# Patient Record
Sex: FEMALE | Race: WHITE | NOT HISPANIC OR LATINO | Employment: FULL TIME | URBAN - METROPOLITAN AREA
[De-identification: names, ages, dates, MRNs, and addresses within clinical notes are randomized per-mention and may not be internally consistent; named-entity substitution may affect disease eponyms.]

---

## 2017-04-25 ENCOUNTER — HISTORICAL (OUTPATIENT)
Dept: LAB | Facility: HOSPITAL | Age: 57
End: 2017-04-25

## 2017-05-22 ENCOUNTER — HISTORICAL (OUTPATIENT)
Dept: RADIOLOGY | Facility: HOSPITAL | Age: 57
End: 2017-05-22

## 2018-08-30 ENCOUNTER — HOSPITAL ENCOUNTER (OUTPATIENT)
Dept: MEDSURG UNIT | Facility: HOSPITAL | Age: 58
End: 2018-09-01
Attending: INTERNAL MEDICINE | Admitting: INTERNAL MEDICINE

## 2018-08-30 LAB
ABS NEUT (OLG): 7.6 X10(3)/MCL (ref 2.1–9.2)
ALBUMIN SERPL-MCNC: 4.2 GM/DL (ref 3.4–5)
ALBUMIN/GLOB SERPL: 1.2 RATIO (ref 1.1–2)
ALP SERPL-CCNC: 91 UNIT/L (ref 38–126)
ALT SERPL-CCNC: 24 UNIT/L (ref 12–78)
APPEARANCE, UA: CLEAR
APTT PPP: 30.3 SECOND(S) (ref 24.8–36.9)
AST SERPL-CCNC: 31 UNIT/L (ref 15–37)
BACTERIA SPEC CULT: ABNORMAL /HPF
BASOPHILS # BLD AUTO: 0 X10(3)/MCL (ref 0–0.2)
BASOPHILS NFR BLD AUTO: 0 %
BILIRUB SERPL-MCNC: 0.6 MG/DL (ref 0.2–1)
BILIRUB UR QL STRIP: NEGATIVE
BILIRUBIN DIRECT+TOT PNL SERPL-MCNC: 0.2 MG/DL (ref 0–0.5)
BILIRUBIN DIRECT+TOT PNL SERPL-MCNC: 0.4 MG/DL (ref 0–0.8)
BUN SERPL-MCNC: 20 MG/DL (ref 7–18)
CALCIUM SERPL-MCNC: 10.1 MG/DL (ref 8.5–10.1)
CHLORIDE SERPL-SCNC: 100 MMOL/L (ref 98–107)
CO2 SERPL-SCNC: 31 MMOL/L (ref 21–32)
COLOR UR: ABNORMAL
CREAT SERPL-MCNC: 0.91 MG/DL (ref 0.55–1.02)
D DIMER PPP IA.FEU-MCNC: 297 NG/ML FEU (ref 0–500)
EOSINOPHIL # BLD AUTO: 0.2 X10(3)/MCL (ref 0–0.9)
EOSINOPHIL NFR BLD AUTO: 1 %
ERYTHROCYTE [DISTWIDTH] IN BLOOD BY AUTOMATED COUNT: 13.2 % (ref 11.5–17)
GLOBULIN SER-MCNC: 3.6 GM/DL (ref 2.4–3.5)
GLUCOSE (UA): NEGATIVE
GLUCOSE SERPL-MCNC: 84 MG/DL (ref 74–106)
HCT VFR BLD AUTO: 42.6 % (ref 37–47)
HGB BLD-MCNC: 13.7 GM/DL (ref 12–16)
HGB UR QL STRIP: NEGATIVE
INR PPP: 0.99 (ref 0–1.27)
KETONES UR QL STRIP: ABNORMAL
LEUKOCYTE ESTERASE UR QL STRIP: ABNORMAL
LYMPHOCYTES # BLD AUTO: 1.8 X10(3)/MCL (ref 0.6–4.6)
LYMPHOCYTES NFR BLD AUTO: 17 %
MCH RBC QN AUTO: 29.3 PG (ref 27–31)
MCHC RBC AUTO-ENTMCNC: 32.2 GM/DL (ref 33–36)
MCV RBC AUTO: 91.2 FL (ref 80–94)
MONOCYTES # BLD AUTO: 0.9 X10(3)/MCL (ref 0.1–1.3)
MONOCYTES NFR BLD AUTO: 8 %
NEUTROPHILS # BLD AUTO: 7.6 X10(3)/MCL (ref 1.4–7.9)
NEUTROPHILS NFR BLD AUTO: 72 %
NITRITE UR QL STRIP: NEGATIVE
PH UR STRIP: 5 [PH] (ref 5–9)
PLATELET # BLD AUTO: 229 X10(3)/MCL (ref 130–400)
PMV BLD AUTO: 12.1 FL (ref 9.4–12.4)
POTASSIUM SERPL-SCNC: 4.2 MMOL/L (ref 3.5–5.1)
PROT SERPL-MCNC: 7.8 GM/DL (ref 6.4–8.2)
PROT UR QL STRIP: NEGATIVE
PROTHROMBIN TIME: 13.4 SECOND(S) (ref 12.2–14.7)
RBC # BLD AUTO: 4.67 X10(6)/MCL (ref 4.2–5.4)
RBC #/AREA URNS HPF: ABNORMAL /[HPF]
SODIUM SERPL-SCNC: 138 MMOL/L (ref 136–145)
SP GR UR STRIP: 1.03 (ref 1–1.03)
SQUAMOUS EPITHELIAL, UA: ABNORMAL
TROPONIN I SERPL-MCNC: <0.02 NG/ML (ref 0.02–0.49)
UROBILINOGEN UR STRIP-ACNC: 0.2
WBC # SPEC AUTO: 10.5 X10(3)/MCL (ref 4.5–11.5)
WBC #/AREA URNS HPF: ABNORMAL /[HPF]

## 2018-08-31 LAB
APTT PPP: 28.5 SECOND(S) (ref 24.8–36.9)
CK MB SERPL-MCNC: <0.5 NG/ML (ref 0.5–3.6)
CK MB SERPL-MCNC: <0.5 NG/ML (ref 0.5–3.6)
CK SERPL-CCNC: 31 UNIT/L (ref 26–192)
CK SERPL-CCNC: 54 UNIT/L (ref 26–192)
TROPONIN I SERPL-MCNC: <0.02 NG/ML (ref 0.02–0.49)
TROPONIN I SERPL-MCNC: <0.02 NG/ML (ref 0.02–0.49)

## 2018-09-01 LAB
ABS NEUT (OLG): 5.07 X10(3)/MCL (ref 2.1–9.2)
ALBUMIN SERPL-MCNC: 2.8 GM/DL (ref 3.4–5)
ALBUMIN/GLOB SERPL: 1 RATIO (ref 1.1–2)
ALP SERPL-CCNC: 72 UNIT/L (ref 38–126)
ALT SERPL-CCNC: 15 UNIT/L (ref 12–78)
AST SERPL-CCNC: 18 UNIT/L (ref 15–37)
BASOPHILS # BLD AUTO: 0 X10(3)/MCL (ref 0–0.2)
BASOPHILS NFR BLD AUTO: 0 %
BILIRUB SERPL-MCNC: 0.4 MG/DL (ref 0.2–1)
BILIRUBIN DIRECT+TOT PNL SERPL-MCNC: 0.1 MG/DL (ref 0–0.5)
BILIRUBIN DIRECT+TOT PNL SERPL-MCNC: 0.3 MG/DL (ref 0–0.8)
BUN SERPL-MCNC: 10 MG/DL (ref 7–18)
CALCIUM SERPL-MCNC: 8.9 MG/DL (ref 8.5–10.1)
CHLORIDE SERPL-SCNC: 108 MMOL/L (ref 98–107)
CO2 SERPL-SCNC: 25 MMOL/L (ref 21–32)
CREAT SERPL-MCNC: 0.69 MG/DL (ref 0.55–1.02)
EOSINOPHIL # BLD AUTO: 0.1 X10(3)/MCL (ref 0–0.9)
EOSINOPHIL NFR BLD AUTO: 2 %
ERYTHROCYTE [DISTWIDTH] IN BLOOD BY AUTOMATED COUNT: 13.2 % (ref 11.5–17)
GLOBULIN SER-MCNC: 2.7 GM/DL (ref 2.4–3.5)
GLUCOSE SERPL-MCNC: 120 MG/DL (ref 74–106)
HCT VFR BLD AUTO: 32.1 % (ref 37–47)
HGB BLD-MCNC: 10.3 GM/DL (ref 12–16)
LYMPHOCYTES # BLD AUTO: 1.6 X10(3)/MCL (ref 0.6–4.6)
LYMPHOCYTES NFR BLD AUTO: 21 %
MCH RBC QN AUTO: 29.3 PG (ref 27–31)
MCHC RBC AUTO-ENTMCNC: 32.1 GM/DL (ref 33–36)
MCV RBC AUTO: 91.5 FL (ref 80–94)
MONOCYTES # BLD AUTO: 0.8 X10(3)/MCL (ref 0.1–1.3)
MONOCYTES NFR BLD AUTO: 11 %
NEUTROPHILS # BLD AUTO: 5.07 X10(3)/MCL (ref 1.4–7.9)
NEUTROPHILS NFR BLD AUTO: 66 %
PLATELET # BLD AUTO: 155 X10(3)/MCL (ref 130–400)
PMV BLD AUTO: 12.4 FL (ref 9.4–12.4)
POTASSIUM SERPL-SCNC: 3.3 MMOL/L (ref 3.5–5.1)
PROT SERPL-MCNC: 5.5 GM/DL (ref 6.4–8.2)
RBC # BLD AUTO: 3.51 X10(6)/MCL (ref 4.2–5.4)
SODIUM SERPL-SCNC: 140 MMOL/L (ref 136–145)
WBC # SPEC AUTO: 7.6 X10(3)/MCL (ref 4.5–11.5)

## 2018-09-05 ENCOUNTER — HISTORICAL (OUTPATIENT)
Dept: ADMINISTRATIVE | Facility: HOSPITAL | Age: 58
End: 2018-09-05

## 2018-09-05 LAB
ABS NEUT (OLG): 3.15 X10(3)/MCL (ref 2.1–9.2)
ALBUMIN SERPL-MCNC: 3.2 GM/DL (ref 3.4–5)
ALBUMIN/GLOB SERPL: 1.1 {RATIO}
ALP SERPL-CCNC: 82 UNIT/L (ref 38–126)
ALT SERPL-CCNC: 17 UNIT/L (ref 12–78)
APPEARANCE, UA: CLEAR
AST SERPL-CCNC: 20 UNIT/L (ref 15–37)
BACTERIA SPEC CULT: NORMAL /HPF
BASOPHILS # BLD AUTO: 0 X10(3)/MCL (ref 0–0.2)
BASOPHILS NFR BLD AUTO: 1 %
BILIRUB SERPL-MCNC: 0.5 MG/DL (ref 0.2–1)
BILIRUB UR QL STRIP: NEGATIVE
BILIRUBIN DIRECT+TOT PNL SERPL-MCNC: 0.2 MG/DL (ref 0–0.2)
BILIRUBIN DIRECT+TOT PNL SERPL-MCNC: 0.3 MG/DL (ref 0–0.8)
BUN SERPL-MCNC: 16 MG/DL (ref 7–18)
CALCIUM SERPL-MCNC: 9.3 MG/DL (ref 8.5–10.1)
CHLORIDE SERPL-SCNC: 107 MMOL/L (ref 98–107)
CHOLEST SERPL-MCNC: 129 MG/DL (ref 0–200)
CHOLEST/HDLC SERPL: 2.9 {RATIO} (ref 0–4)
CO2 SERPL-SCNC: 24 MMOL/L (ref 21–32)
COLOR UR: NORMAL
CREAT SERPL-MCNC: 0.69 MG/DL (ref 0.55–1.02)
DEPRECATED CALCIDIOL+CALCIFEROL SERPL-MC: 28.49 NG/ML (ref 30–80)
EOSINOPHIL # BLD AUTO: 0.2 X10(3)/MCL (ref 0–0.9)
EOSINOPHIL NFR BLD AUTO: 3 %
ERYTHROCYTE [DISTWIDTH] IN BLOOD BY AUTOMATED COUNT: 13.2 % (ref 11.5–17)
EST. AVERAGE GLUCOSE BLD GHB EST-MCNC: 111 MG/DL
GLOBULIN SER-MCNC: 3 GM/DL (ref 2.4–3.5)
GLUCOSE (UA): NEGATIVE
GLUCOSE SERPL-MCNC: 86 MG/DL (ref 74–106)
H PYLORI AB SER IA-ACNC: NEGATIVE
HBA1C MFR BLD: 5.5 % (ref 4.2–6.3)
HCT VFR BLD AUTO: 34.5 % (ref 37–47)
HDLC SERPL-MCNC: 45 MG/DL (ref 35–60)
HGB BLD-MCNC: 11.2 GM/DL (ref 12–16)
HGB UR QL STRIP: NEGATIVE
KETONES UR QL STRIP: NEGATIVE
LDLC SERPL CALC-MCNC: 69 MG/DL (ref 0–129)
LEUKOCYTE ESTERASE UR QL STRIP: NEGATIVE
LYMPHOCYTES # BLD AUTO: 1.5 X10(3)/MCL (ref 0.6–4.6)
LYMPHOCYTES NFR BLD AUTO: 28 %
MCH RBC QN AUTO: 29.6 PG (ref 27–31)
MCHC RBC AUTO-ENTMCNC: 32.5 GM/DL (ref 33–36)
MCV RBC AUTO: 91 FL (ref 80–94)
MONOCYTES # BLD AUTO: 0.5 X10(3)/MCL (ref 0.1–1.3)
MONOCYTES NFR BLD AUTO: 9 %
NEUTROPHILS # BLD AUTO: 3.15 X10(3)/MCL (ref 1.4–7.9)
NEUTROPHILS NFR BLD AUTO: 59 %
NITRITE UR QL STRIP: NEGATIVE
PH UR STRIP: 6 [PH] (ref 5–9)
PLATELET # BLD AUTO: 223 X10(3)/MCL (ref 130–400)
PMV BLD AUTO: 11.8 FL (ref 9.4–12.4)
POTASSIUM SERPL-SCNC: 4.4 MMOL/L (ref 3.5–5.1)
PROT SERPL-MCNC: 6.2 GM/DL (ref 6.4–8.2)
PROT UR QL STRIP: NEGATIVE
RBC # BLD AUTO: 3.79 X10(6)/MCL (ref 4.2–5.4)
RBC #/AREA URNS HPF: NORMAL /[HPF]
SODIUM SERPL-SCNC: 140 MMOL/L (ref 136–145)
SP GR UR STRIP: 1.02 (ref 1–1.03)
SQUAMOUS EPITHELIAL, UA: NORMAL
TRIGL SERPL-MCNC: 77 MG/DL (ref 30–150)
TSH SERPL-ACNC: 1.59 MIU/L (ref 0.36–3.74)
UROBILINOGEN UR STRIP-ACNC: 0.2
VLDLC SERPL CALC-MCNC: 15 MG/DL
WBC # SPEC AUTO: 5.3 X10(3)/MCL (ref 4.5–11.5)
WBC #/AREA URNS HPF: NORMAL /[HPF]

## 2018-10-12 LAB
CRC RECOMMENDATION EXT: NORMAL
CRC RECOMMENDATION EXT: NORMAL

## 2018-10-25 ENCOUNTER — HISTORICAL (OUTPATIENT)
Dept: RADIOLOGY | Facility: HOSPITAL | Age: 58
End: 2018-10-25

## 2018-12-21 ENCOUNTER — HOSPITAL ENCOUNTER (OUTPATIENT)
Dept: MEDSURG UNIT | Facility: HOSPITAL | Age: 58
End: 2018-12-21
Attending: SURGERY | Admitting: SURGERY

## 2019-04-26 ENCOUNTER — HISTORICAL (OUTPATIENT)
Dept: RADIOLOGY | Facility: HOSPITAL | Age: 59
End: 2019-04-26

## 2019-08-19 ENCOUNTER — HISTORICAL (OUTPATIENT)
Dept: ADMINISTRATIVE | Facility: HOSPITAL | Age: 59
End: 2019-08-19

## 2019-08-19 LAB
ABS NEUT (OLG): 2.62 X10(3)/MCL (ref 2.1–9.2)
ALBUMIN SERPL-MCNC: 3.6 GM/DL (ref 3.4–5)
ALBUMIN/GLOB SERPL: 1.3 RATIO (ref 1.1–2)
ALP SERPL-CCNC: 78 UNIT/L (ref 38–126)
ALT SERPL-CCNC: 21 UNIT/L (ref 12–78)
APPEARANCE, UA: CLEAR
AST SERPL-CCNC: 18 UNIT/L (ref 15–37)
BACTERIA SPEC CULT: ABNORMAL /HPF
BASOPHILS # BLD AUTO: 0 X10(3)/MCL (ref 0–0.2)
BASOPHILS NFR BLD AUTO: 1 %
BILIRUB SERPL-MCNC: 0.5 MG/DL (ref 0.2–1)
BILIRUB UR QL STRIP: NEGATIVE
BILIRUBIN DIRECT+TOT PNL SERPL-MCNC: 0.1 MG/DL (ref 0–0.5)
BILIRUBIN DIRECT+TOT PNL SERPL-MCNC: 0.4 MG/DL (ref 0–0.8)
BUN SERPL-MCNC: 17 MG/DL (ref 7–18)
CALCIUM SERPL-MCNC: 8.6 MG/DL (ref 8.5–10.1)
CHLORIDE SERPL-SCNC: 111 MMOL/L (ref 98–107)
CHOLEST SERPL-MCNC: 177 MG/DL (ref 0–200)
CHOLEST/HDLC SERPL: 3.1 {RATIO} (ref 0–4)
CO2 SERPL-SCNC: 29 MMOL/L (ref 21–32)
COLOR UR: YELLOW
CREAT SERPL-MCNC: 0.9 MG/DL (ref 0.55–1.02)
EOSINOPHIL # BLD AUTO: 0.1 X10(3)/MCL (ref 0–0.9)
EOSINOPHIL NFR BLD AUTO: 3 %
ERYTHROCYTE [DISTWIDTH] IN BLOOD BY AUTOMATED COUNT: 12.6 % (ref 11.5–17)
EST. AVERAGE GLUCOSE BLD GHB EST-MCNC: 103 MG/DL
GLOBULIN SER-MCNC: 2.8 GM/DL (ref 2.4–3.5)
GLUCOSE (UA): NEGATIVE
GLUCOSE SERPL-MCNC: 101 MG/DL (ref 74–106)
HBA1C MFR BLD: 5.2 % (ref 4.2–6.3)
HCT VFR BLD AUTO: 38.9 % (ref 37–47)
HDLC SERPL-MCNC: 57 MG/DL (ref 35–60)
HGB BLD-MCNC: 12.5 GM/DL (ref 12–16)
HGB UR QL STRIP: NEGATIVE
KETONES UR QL STRIP: NEGATIVE
LDLC SERPL CALC-MCNC: 86 MG/DL (ref 0–129)
LEUKOCYTE ESTERASE UR QL STRIP: ABNORMAL
LYMPHOCYTES # BLD AUTO: 1.7 X10(3)/MCL (ref 0.6–4.6)
LYMPHOCYTES NFR BLD AUTO: 35 %
MCH RBC QN AUTO: 30.1 PG (ref 27–31)
MCHC RBC AUTO-ENTMCNC: 32.1 GM/DL (ref 33–36)
MCV RBC AUTO: 93.7 FL (ref 80–94)
MONOCYTES # BLD AUTO: 0.4 X10(3)/MCL (ref 0.1–1.3)
MONOCYTES NFR BLD AUTO: 8 %
NEUTROPHILS # BLD AUTO: 2.62 X10(3)/MCL (ref 2.1–9.2)
NEUTROPHILS NFR BLD AUTO: 53 %
NITRITE UR QL STRIP: NEGATIVE
PH UR STRIP: 5 [PH] (ref 5–9)
PLATELET # BLD AUTO: 191 X10(3)/MCL (ref 130–400)
PMV BLD AUTO: 11.8 FL (ref 9.4–12.4)
POTASSIUM SERPL-SCNC: 4.3 MMOL/L (ref 3.5–5.1)
PROT SERPL-MCNC: 6.4 GM/DL (ref 6.4–8.2)
PROT UR QL STRIP: NEGATIVE
RBC # BLD AUTO: 4.15 X10(6)/MCL (ref 4.2–5.4)
RBC #/AREA URNS HPF: 0 /HPF (ref 0–2)
SODIUM SERPL-SCNC: 144 MMOL/L (ref 136–145)
SP GR UR STRIP: 1.02 (ref 1–1.03)
SQUAMOUS EPITHELIAL, UA: 0 /HPF (ref 0–4)
TRIGL SERPL-MCNC: 168 MG/DL (ref 30–150)
TSH SERPL-ACNC: 1.9 MIU/L (ref 0.36–3.74)
UROBILINOGEN UR STRIP-ACNC: 0.2
VLDLC SERPL CALC-MCNC: 34 MG/DL
WBC # SPEC AUTO: 4.9 X10(3)/MCL (ref 4.5–11.5)
WBC #/AREA URNS HPF: 0 /HPF (ref 0–3)

## 2020-05-26 ENCOUNTER — HISTORICAL (OUTPATIENT)
Dept: RADIOLOGY | Facility: HOSPITAL | Age: 60
End: 2020-05-26

## 2020-06-23 ENCOUNTER — HISTORICAL (OUTPATIENT)
Dept: ADMINISTRATIVE | Facility: HOSPITAL | Age: 60
End: 2020-06-23

## 2020-08-21 ENCOUNTER — HISTORICAL (OUTPATIENT)
Dept: ADMINISTRATIVE | Facility: HOSPITAL | Age: 60
End: 2020-08-21

## 2020-08-21 LAB
ABS NEUT (OLG): 2.75 X10(3)/MCL (ref 2.1–9.2)
ALBUMIN SERPL-MCNC: 3.9 GM/DL (ref 3.4–5)
ALBUMIN/GLOB SERPL: 1.4 RATIO (ref 1.1–2)
ALP SERPL-CCNC: 95 UNIT/L (ref 40–150)
ALT SERPL-CCNC: 26 UNIT/L (ref 0–55)
APPEARANCE, UA: CLEAR
AST SERPL-CCNC: 29 UNIT/L (ref 5–34)
BACTERIA SPEC CULT: ABNORMAL /HPF
BASOPHILS # BLD AUTO: 0 X10(3)/MCL (ref 0–0.2)
BASOPHILS NFR BLD AUTO: 1 %
BILIRUB SERPL-MCNC: 0.5 MG/DL
BILIRUB UR QL STRIP: NEGATIVE
BILIRUBIN DIRECT+TOT PNL SERPL-MCNC: 0.2 MG/DL (ref 0–0.5)
BILIRUBIN DIRECT+TOT PNL SERPL-MCNC: 0.3 MG/DL (ref 0–0.8)
BUN SERPL-MCNC: 13.2 MG/DL (ref 9.8–20.1)
CALCIUM SERPL-MCNC: 9.1 MG/DL (ref 8.4–10.2)
CHLORIDE SERPL-SCNC: 105 MMOL/L (ref 98–107)
CHOLEST SERPL-MCNC: 200 MG/DL
CHOLEST/HDLC SERPL: 4 {RATIO} (ref 0–5)
CO2 SERPL-SCNC: 28 MMOL/L (ref 23–31)
COLOR UR: ABNORMAL
CREAT SERPL-MCNC: 0.86 MG/DL (ref 0.55–1.02)
DEPRECATED CALCIDIOL+CALCIFEROL SERPL-MC: 35 NG/ML (ref 6.6–49.9)
EOSINOPHIL # BLD AUTO: 0.1 X10(3)/MCL (ref 0–0.9)
EOSINOPHIL NFR BLD AUTO: 2 %
ERYTHROCYTE [DISTWIDTH] IN BLOOD BY AUTOMATED COUNT: 13.9 % (ref 11.5–17)
EST. AVERAGE GLUCOSE BLD GHB EST-MCNC: 105.4 MG/DL
GLOBULIN SER-MCNC: 2.8 GM/DL (ref 2.4–3.5)
GLUCOSE (UA): NEGATIVE
GLUCOSE SERPL-MCNC: 89 MG/DL (ref 82–115)
HBA1C MFR BLD: 5.3 %
HCT VFR BLD AUTO: 41.2 % (ref 37–47)
HDLC SERPL-MCNC: 55 MG/DL (ref 35–60)
HGB BLD-MCNC: 13.2 GM/DL (ref 12–16)
HGB UR QL STRIP: NEGATIVE
KETONES UR QL STRIP: NEGATIVE
LDLC SERPL CALC-MCNC: 117 MG/DL (ref 50–140)
LEUKOCYTE ESTERASE UR QL STRIP: ABNORMAL
LYMPHOCYTES # BLD AUTO: 2 X10(3)/MCL (ref 0.6–4.6)
LYMPHOCYTES NFR BLD AUTO: 37 %
MCH RBC QN AUTO: 30.6 PG (ref 27–31)
MCHC RBC AUTO-ENTMCNC: 32 GM/DL (ref 33–36)
MCV RBC AUTO: 95.4 FL (ref 80–94)
MONOCYTES # BLD AUTO: 0.4 X10(3)/MCL (ref 0.1–1.3)
MONOCYTES NFR BLD AUTO: 8 %
NEUTROPHILS # BLD AUTO: 2.75 X10(3)/MCL (ref 2.1–9.2)
NEUTROPHILS NFR BLD AUTO: 52 %
NITRITE UR QL STRIP: NEGATIVE
PH UR STRIP: 5 [PH] (ref 5–9)
PLATELET # BLD AUTO: 211 X10(3)/MCL (ref 130–400)
PMV BLD AUTO: 12.3 FL (ref 9.4–12.4)
POTASSIUM SERPL-SCNC: 4.5 MMOL/L (ref 3.5–5.1)
PROT SERPL-MCNC: 6.7 GM/DL (ref 5.8–7.6)
PROT UR QL STRIP: NEGATIVE
RBC # BLD AUTO: 4.32 X10(6)/MCL (ref 4.2–5.4)
RBC #/AREA URNS HPF: ABNORMAL /[HPF]
SODIUM SERPL-SCNC: 140 MMOL/L (ref 136–145)
SP GR UR STRIP: 1.02 (ref 1–1.03)
SQUAMOUS EPITHELIAL, UA: ABNORMAL
TRIGL SERPL-MCNC: 141 MG/DL (ref 37–140)
TSH SERPL-ACNC: 2.34 UIU/ML (ref 0.35–4.94)
UROBILINOGEN UR STRIP-ACNC: 0.2
VLDLC SERPL CALC-MCNC: 28 MG/DL
WBC # SPEC AUTO: 5.3 X10(3)/MCL (ref 4.5–11.5)
WBC #/AREA URNS HPF: 20 /HPF (ref 0–3)

## 2020-08-23 LAB — FINAL CULTURE: NO GROWTH

## 2020-10-29 ENCOUNTER — HISTORICAL (OUTPATIENT)
Dept: RADIOLOGY | Facility: HOSPITAL | Age: 60
End: 2020-10-29

## 2020-12-01 ENCOUNTER — HISTORICAL (OUTPATIENT)
Dept: LAB | Facility: HOSPITAL | Age: 60
End: 2020-12-01

## 2021-01-05 ENCOUNTER — HISTORICAL (OUTPATIENT)
Dept: LAB | Facility: HOSPITAL | Age: 61
End: 2021-01-05

## 2021-01-11 ENCOUNTER — HISTORICAL (OUTPATIENT)
Dept: INFECTIOUS DISEASES | Facility: HOSPITAL | Age: 61
End: 2021-01-11

## 2021-02-09 ENCOUNTER — HISTORICAL (OUTPATIENT)
Dept: ADMINISTRATIVE | Facility: HOSPITAL | Age: 61
End: 2021-02-09

## 2021-02-11 LAB — FINAL CULTURE: NORMAL

## 2021-04-21 ENCOUNTER — HISTORICAL (OUTPATIENT)
Dept: CARDIOLOGY | Facility: HOSPITAL | Age: 61
End: 2021-04-21

## 2021-06-26 ENCOUNTER — HISTORICAL (OUTPATIENT)
Dept: ADMINISTRATIVE | Facility: HOSPITAL | Age: 61
End: 2021-06-26

## 2021-06-26 LAB — SARS-COV-2 RNA RESP QL NAA+PROBE: NEGATIVE

## 2021-06-28 LAB — FINAL CULTURE: NORMAL

## 2021-10-25 ENCOUNTER — HISTORICAL (OUTPATIENT)
Dept: ADMINISTRATIVE | Facility: HOSPITAL | Age: 61
End: 2021-10-25

## 2021-10-25 LAB
ABS NEUT (OLG): 3.47 X10(3)/MCL (ref 2.1–9.2)
ALBUMIN SERPL-MCNC: 4.4 GM/DL (ref 3.4–4.8)
ALBUMIN/GLOB SERPL: 1.5 RATIO (ref 1.1–2)
ALP SERPL-CCNC: 88 UNIT/L (ref 40–150)
ALT SERPL-CCNC: 37 UNIT/L (ref 0–55)
APPEARANCE, UA: CLEAR
AST SERPL-CCNC: 35 UNIT/L (ref 5–34)
BACTERIA SPEC CULT: NORMAL /HPF
BASOPHILS # BLD AUTO: 0 X10(3)/MCL (ref 0–0.2)
BASOPHILS NFR BLD AUTO: 1 %
BILIRUB SERPL-MCNC: 0.7 MG/DL
BILIRUB UR QL STRIP: NEGATIVE
BILIRUBIN DIRECT+TOT PNL SERPL-MCNC: 0.3 MG/DL (ref 0–0.5)
BILIRUBIN DIRECT+TOT PNL SERPL-MCNC: 0.4 MG/DL (ref 0–0.8)
BUN SERPL-MCNC: 22.8 MG/DL (ref 9.8–20.1)
CALCIUM SERPL-MCNC: 10.1 MG/DL (ref 8.7–10.5)
CHLORIDE SERPL-SCNC: 102 MMOL/L (ref 98–107)
CHOLEST SERPL-MCNC: 183 MG/DL
CHOLEST/HDLC SERPL: 2 {RATIO} (ref 0–5)
CO2 SERPL-SCNC: 27 MMOL/L (ref 23–31)
COLOR UR: YELLOW
CREAT SERPL-MCNC: 0.93 MG/DL (ref 0.55–1.02)
EOSINOPHIL # BLD AUTO: 0.2 X10(3)/MCL (ref 0–0.9)
EOSINOPHIL NFR BLD AUTO: 3 %
ERYTHROCYTE [DISTWIDTH] IN BLOOD BY AUTOMATED COUNT: 16.5 % (ref 11.5–17)
GLOBULIN SER-MCNC: 3 GM/DL (ref 2.4–3.5)
GLUCOSE (UA): NEGATIVE
GLUCOSE SERPL-MCNC: 91 MG/DL (ref 82–115)
HCT VFR BLD AUTO: 35.2 % (ref 37–47)
HDLC SERPL-MCNC: 77 MG/DL (ref 35–60)
HGB BLD-MCNC: 11 GM/DL (ref 12–16)
HGB UR QL STRIP: NEGATIVE
KETONES UR QL STRIP: NEGATIVE
LDLC SERPL CALC-MCNC: 91 MG/DL (ref 50–140)
LEUKOCYTE ESTERASE UR QL STRIP: NEGATIVE
LYMPHOCYTES # BLD AUTO: 2.3 X10(3)/MCL (ref 0.6–4.6)
LYMPHOCYTES NFR BLD AUTO: 35 %
MCH RBC QN AUTO: 25.6 PG (ref 27–31)
MCHC RBC AUTO-ENTMCNC: 31.3 GM/DL (ref 33–36)
MCV RBC AUTO: 82.1 FL (ref 80–94)
MONOCYTES # BLD AUTO: 0.6 X10(3)/MCL (ref 0.1–1.3)
MONOCYTES NFR BLD AUTO: 9 %
NEUTROPHILS # BLD AUTO: 3.47 X10(3)/MCL (ref 2.1–9.2)
NEUTROPHILS NFR BLD AUTO: 52 %
NITRITE UR QL STRIP: NEGATIVE
PH UR STRIP: 6 [PH] (ref 5–9)
PLATELET # BLD AUTO: 302 X10(3)/MCL (ref 130–400)
PMV BLD AUTO: 11.2 FL (ref 9.4–12.4)
POTASSIUM SERPL-SCNC: 3.9 MMOL/L (ref 3.5–5.1)
PROT SERPL-MCNC: 7.4 GM/DL (ref 5.8–7.6)
PROT UR QL STRIP: NEGATIVE
RBC # BLD AUTO: 4.29 X10(6)/MCL (ref 4.2–5.4)
RBC #/AREA URNS HPF: NORMAL /[HPF]
SODIUM SERPL-SCNC: 141 MMOL/L (ref 136–145)
SP GR UR STRIP: >1.03 (ref 1–1.03)
SQUAMOUS EPITHELIAL, UA: NORMAL /HPF (ref 0–4)
TRIGL SERPL-MCNC: 77 MG/DL (ref 37–140)
TSH SERPL-ACNC: 1.85 UIU/ML (ref 0.35–4.94)
UROBILINOGEN UR STRIP-ACNC: 0.2
VLDLC SERPL CALC-MCNC: 15 MG/DL
WBC # SPEC AUTO: 6.6 X10(3)/MCL (ref 4.5–11.5)
WBC #/AREA URNS HPF: NORMAL /[HPF]

## 2022-01-28 ENCOUNTER — HISTORICAL (OUTPATIENT)
Dept: ADMINISTRATIVE | Facility: HOSPITAL | Age: 62
End: 2022-01-28

## 2022-01-28 ENCOUNTER — HOSPITAL ENCOUNTER (OUTPATIENT)
Dept: EMERGENCY MEDICINE | Facility: HOSPITAL | Age: 62
End: 2022-01-28
Attending: INTERNAL MEDICINE | Admitting: INTERNAL MEDICINE

## 2022-01-28 LAB
ABS NEUT (OLG): 3.78 (ref 2.1–9.2)
ALBUMIN SERPL-MCNC: 3.7 G/DL (ref 3.4–4.8)
ALBUMIN/GLOB SERPL: 1.3 {RATIO} (ref 1.1–2)
ALP SERPL-CCNC: 91 U/L (ref 40–150)
ALT SERPL-CCNC: 43 U/L (ref 0–55)
ANISOCYTOSIS BLD QL SMEAR: 1
APTT PPP: 25.4 S (ref 23.2–33.7)
AST SERPL-CCNC: 48 U/L (ref 5–34)
BASOPHILS # BLD AUTO: 0 10*3/UL (ref 0–0.2)
BASOPHILS NFR BLD AUTO: 1 %
BILIRUB SERPL-MCNC: 0.6 MG/DL
BILIRUBIN DIRECT+TOT PNL SERPL-MCNC: 0.3 (ref 0–0.5)
BILIRUBIN DIRECT+TOT PNL SERPL-MCNC: 0.3 (ref 0–0.8)
BNP BLD-MCNC: 221.8 PG/ML
BUN SERPL-MCNC: 13.1 MG/DL (ref 9.8–20.1)
CALCIUM SERPL-MCNC: 9.7 MG/DL (ref 8.7–10.5)
CHLORIDE SERPL-SCNC: 103 MMOL/L (ref 98–107)
CO2 SERPL-SCNC: 24 MMOL/L (ref 23–31)
CREAT SERPL-MCNC: 0.82 MG/DL (ref 0.55–1.02)
EOSINOPHIL # BLD AUTO: 0.2 10*3/UL (ref 0–0.9)
EOSINOPHIL NFR BLD AUTO: 4 %
ERYTHROCYTE [DISTWIDTH] IN BLOOD BY AUTOMATED COUNT: 25.5 % (ref 11.5–17)
GLOBULIN SER-MCNC: 2.8 G/DL (ref 2.4–3.5)
GLUCOSE SERPL-MCNC: 95 MG/DL (ref 82–115)
HCT VFR BLD AUTO: 35.5 % (ref 37–47)
HEMOLYSIS INTERF INDEX SERPL-ACNC: 38
HEMOLYSIS INTERF INDEX SERPL-ACNC: 38
HGB BLD-MCNC: 10.4 G/DL (ref 12–16)
HYPOCHROMIA BLD QL SMEAR: 1
ICTERIC INTERF INDEX SERPL-ACNC: 1
INR PPP: 1 (ref 0–1.3)
LIPEMIC INTERF INDEX SERPL-ACNC: 8
LYMPHOCYTES # BLD AUTO: 1.4 10*3/UL (ref 0.6–4.6)
LYMPHOCYTES NFR BLD AUTO: 22 %
MACROCYTES BLD QL SMEAR: 1
MAGNESIUM SERPL-MCNC: 2.1 MG/DL (ref 1.6–2.6)
MANUAL DIFF? (OHS): NO
MCH RBC QN AUTO: 24.5 PG (ref 27–31)
MCHC RBC AUTO-ENTMCNC: 29.3 G/DL (ref 33–36)
MCV RBC AUTO: 83.5 FL (ref 80–94)
MICROCYTES BLD QL SMEAR: 1
MONOCYTES # BLD AUTO: 0.6 10*3/UL (ref 0.1–1.3)
MONOCYTES NFR BLD AUTO: 10 %
NEUTROPHILS # BLD AUTO: 3.78 10*3/UL (ref 2.1–9.2)
NEUTROPHILS NFR BLD AUTO: 62 %
PLATELET # BLD AUTO: 217 10*3/UL (ref 130–400)
PLATELET # BLD EST: NORMAL 10*3/UL
PMV BLD AUTO: 10.8 FL (ref 7.4–10.4)
POLYCHROMASIA BLD QL SMEAR: 1
POS ERR1 (OHS): NORMAL
POTASSIUM SERPL-SCNC: 4.8 MMOL/L (ref 3.5–5.1)
PROT SERPL-MCNC: 6.5 G/DL (ref 5.8–7.6)
PROTHROMBIN TIME: 12.8 S (ref 12.5–14.5)
RBC # BLD AUTO: 4.25 10*6/UL (ref 4.2–5.4)
RBC MORPH BLD: NORMAL
SCAN RECIEVED (OHS): YES
SCAN RECIEVED (OHS): YES
SODIUM SERPL-SCNC: 136 MMOL/L (ref 136–145)
TROPONIN I SERPL-MCNC: <0.01 NG/ML (ref 0–0.04)
TROPONIN I SERPL-MCNC: <0.01 NG/ML (ref 0–0.04)
WBC # SPEC AUTO: 6.1 10*3/UL (ref 4.5–11.5)

## 2022-02-02 ENCOUNTER — HISTORICAL (OUTPATIENT)
Dept: CARDIOLOGY | Facility: HOSPITAL | Age: 62
End: 2022-02-02

## 2022-02-04 ENCOUNTER — HISTORICAL (OUTPATIENT)
Dept: ADMINISTRATIVE | Facility: HOSPITAL | Age: 62
End: 2022-02-04

## 2022-02-24 ENCOUNTER — HISTORICAL (OUTPATIENT)
Dept: ADMINISTRATIVE | Facility: HOSPITAL | Age: 62
End: 2022-02-24

## 2022-02-24 LAB
BASOPHILS # BLD AUTO: 0 10*3/UL (ref 0–0.2)
BASOPHILS NFR BLD AUTO: 1 %
BUN SERPL-MCNC: 17.3 MG/DL (ref 9.8–20.1)
CALCIUM SERPL-MCNC: 10 MG/DL (ref 8.7–10.5)
CHLORIDE SERPL-SCNC: 105 MMOL/L (ref 98–107)
CO2 SERPL-SCNC: 28 MMOL/L (ref 23–31)
CREAT SERPL-MCNC: 0.79 MG/DL (ref 0.55–1.02)
CREAT/UREA NIT SERPL: 22
DEPRECATED CALCIDIOL+CALCIFEROL SERPL-MC: 36.8 NG/ML (ref 30–80)
EOSINOPHIL # BLD AUTO: 0.2 10*3/UL (ref 0–0.9)
EOSINOPHIL NFR BLD AUTO: 4 %
FERRITIN SERPL-MCNC: 63.33 NG/ML (ref 4.63–204)
GLUCOSE SERPL-MCNC: 113 MG/DL (ref 82–115)
HEMOLYSIS INTERF INDEX SERPL-ACNC: 4
ICTERIC INTERF INDEX SERPL-ACNC: 1
IRON SATN MFR SERPL: 32 % (ref 20–50)
IRON SERPL-MCNC: 100 UG/DL (ref 50–170)
LIPEMIC INTERF INDEX SERPL-ACNC: 12
LYMPHOCYTES # BLD AUTO: 1.3 10*3/UL (ref 0.6–4.6)
LYMPHOCYTES NFR BLD AUTO: 23 %
MONOCYTES # BLD AUTO: 0.5 10*3/UL (ref 0.1–1.3)
MONOCYTES NFR BLD AUTO: 9 %
NEUTROPHILS # BLD AUTO: 3.63 10*3/UL (ref 2.1–9.2)
NEUTROPHILS NFR BLD AUTO: 63 %
POTASSIUM SERPL-SCNC: 4.8 MMOL/L (ref 3.5–5.1)
SODIUM SERPL-SCNC: 141 MMOL/L (ref 136–145)
TIBC SERPL-MCNC: 212 UG/DL (ref 70–310)
TIBC SERPL-MCNC: 312 UG/DL (ref 250–450)
TRANSFERRIN SERPL-MCNC: 273 MG/DL (ref 173–360)
TSH SERPL-ACNC: 1.95 M[IU]/L (ref 0.35–4.94)
VIT B12 SERPL-MCNC: 843 PG/ML (ref 213–816)

## 2022-03-18 ENCOUNTER — HISTORICAL (OUTPATIENT)
Dept: RADIOLOGY | Facility: HOSPITAL | Age: 62
End: 2022-03-18

## 2022-03-18 ENCOUNTER — HISTORICAL (OUTPATIENT)
Dept: ADMINISTRATIVE | Facility: HOSPITAL | Age: 62
End: 2022-03-18

## 2022-03-23 ENCOUNTER — HISTORICAL (OUTPATIENT)
Dept: ADMINISTRATIVE | Facility: HOSPITAL | Age: 62
End: 2022-03-23

## 2022-04-10 ENCOUNTER — HISTORICAL (OUTPATIENT)
Dept: ADMINISTRATIVE | Facility: HOSPITAL | Age: 62
End: 2022-04-10
Payer: COMMERCIAL

## 2022-04-29 VITALS
BODY MASS INDEX: 29.97 KG/M2 | DIASTOLIC BLOOD PRESSURE: 70 MMHG | OXYGEN SATURATION: 97 % | SYSTOLIC BLOOD PRESSURE: 105 MMHG | WEIGHT: 179.88 LBS | HEIGHT: 65 IN

## 2022-04-30 NOTE — H&P
Patient:   Tiny Silverman            MRN: [id number]            FIN: [id number]               Age:   58 years     Sex:  Female     :  1960   Associated Diagnoses:   None   Author:   Rigo Moody MD      Chief Complaint   2018 16:24 CDT      chest pain onset 1pm , increase pain with deep breath. pt went to [facility] saw Dr Olivo sent to ER for further testing      History of Present Illness   This a 58-year-old female well known to [facility] seen in the clinic yesterday by Dr. Brown complains of chest tightness and squeezing for about the last 2 hours.  She noted some associated numbness to her left arm.  States she was sitting while at work.  She did take several nitroglycerin without relief of the chest pain.  She does note that she has had several episodes where it is worse with exertion.  She does note some mild associated shortness of breath, no diaphoresis, mild nausea, but notes radiation to both of her jaws.  Because of her symptoms she was directed to the emergency department for evaluation and subsequently was admitted.    At the present time she does not appear to be in any distress, but does note that her chest discomfort has not left.  She says it has maintained to be a dull, ache in the midsternal area, worse with movement, worse with exertion.    Past medical history:  Coronary artery disease  Hypertension  Peripheral arterial disease  Dyslipidemia  Anxiety    Past surgical history:  Appendectomy  Hysterectomy  Tonsillectomy  Nephrolithotomy with removal of calculi  Laparoscopy    Transmyocardial revascularization of the inferolateral and lateral walls were 2017   (Vessels too small for coronary bypass)  PTCA/MICHELLE LAD 2.75 x 16 3/3/2015  PTCA MICHELLE circumflex 2.5 x 32 and 2.5 x 12 3/20/2015  PTCA MICHELLE circumflex 30 by 38 2016  Laser atherectomy, PTCA/MICHELLE circumflex artery proximal 16    Left heart catheterization/peripheral angiography 2016   LM-Normal  LAD 30-40% patent  proximal stent  Circumflex 70% proximal in-stent restenosis, 70% ostial stenosis PTA laser MICHELLE proximal circumflex  Obtuse marginal one 90%, obtuse marginal 2 100% with left to right collaterals from a diagonal  RCA nondominant vessel 6070% mid vessel stenosis  EF normal    Peripheral angiogram revealed a 50% left common iliac stenosis      Social history:  Former smoker    Family history:  Father  age of 46 he had hypertension, coronary artery disease  Mother  age of 61 from a cardiac arrhythmia, she had hypertension  Brother with CABG, hypertension, CAD  Sr. age 32  secondary to cerebrovascular accident, had hypertension      Review of Systems   Constitutional:  Negative except as documented in history of present illness.    Eye:  Negative except as documented in history of present illness.    Ear/Nose/Mouth/Throat:  Negative except as documented in history of present illness.    Respiratory:  Shortness of breath.    Cardiovascular:  Chest pain.    Gastrointestinal:  Negative except as documented in history of present illness.    Genitourinary:  Negative except as documented in history of present illness.    Hematology/Lymphatics:  Negative except as documented in history of present illness.    Endocrine:  Negative except as documented in history of present illness.    Immunologic:  Negative except as documented in history of present illness.    Musculoskeletal:  Negative except as documented in history of present illness.    Integumentary:  Negative except as documented in history of present illness.    Neurologic:  Negative except as documented in history of present illness.    Psychiatric:  Negative except as documented in history of present illness.    ROS reviewed as documented in chart      Health Status   Allergies:    Allergic Reactions (Selected)  Severity Not Documented  Penicillins- No reactions were documented.  Nonallergic Reactions (Selected)  Severity Not Documented  Penicillin- Was  uanable to arouse.   Current medications:    Medications (16) Active  Scheduled: (2)  aspirin 325 mg Tab  325 mg 1 tab(s), Oral, Daily  Nitroglycerin 2% OINTMENT-PKT  0.5 in, TOP, q6hr  Continuous: (0)  PRN: (14)  acetaminophen 500 mg Tab  1,000 mg 2 tab(s), Oral, q6hr  acetaminophen 650 mg Sup UD  650 mg 1 supp, MI, q6hr  acetaminophen-codeine 300 mg-30 mg Tab UD  1 tab, Oral, q6hr  Al hydrox/Mg hydrox/simeth -400-40 mg/5 mL Kaitlynn UD  30 mL, Oral, q4hr  alprazolam 0.25 mg Tab UD  0.25 mg 1 tab(s), Oral, q6hr  diphenhydrAMINE 25 mg Cap UD  25 mg 1 cap(s), Oral, q4hr  diphenhydrAMINE 25 mg Cap UD  25 mg 1 cap(s), Oral, Once a day (at bedtime)  docusate-senna 50-8.6mg Tab UD  1 tab(s), Oral, BID  hydrALAzine 20 mg/ml Inj- 1 mL  10 mg 0.5 mL, IV Push, q2hr  hydrocodone 5mg/APAP 325 mg  1 tab(s), Oral, q4hr  labetalol 5 mg/mL 20mL vial  10 mg 2 mL, IV Push, q1hr  lidocaine topical 2% Jelly -5mL  5 mL, TOP, Once  magnesium hydroxide 8% Kaitlynn (MOM) UD  30 mL, Oral, Daily  Nitroglycerin 0.4 mg TAB (per 25's)  0.4 mg 1 tab(s), SL, q5min        Physical Examination   General:  Alert and oriented, No acute distress.    Eye:  Pupils are equal, round and reactive to light, Extraocular movements are intact.    HENT:  Normocephalic, Tympanic membranes are clear.    Neck:  Supple, Non-tender, No carotid bruit, No jugular venous distention.    Respiratory:  Lungs are clear to auscultation, Respirations are non-labored, Breath sounds are equal.    Cardiovascular:  Normal rate, Regular rhythm, Normal peripheral perfusion, No edema.    Gastrointestinal:  Soft, Non-tender, Normal bowel sounds.       Vital Signs (last 24 hrs)_____  Last Charted___________  Temp Oral     36.7 DegC  (AUG 31 07:59)  Heart Rate Peripheral   L 57bpm  (AUG 31 07:59)  Resp Rate         18 br/min  (AUG 30 22:18)  SBP      100 mmHg  (AUG 31 07:59)  DBP      L 54mmHg  (AUG 31 07:59)  SpO2      94 %  (AUG 31 07:59)     Genitourinary:  No costovertebral angle  tenderness.    Musculoskeletal:  Normal range of motion, Normal strength.    Integumentary:  Warm, Dry, Pink.    Neurologic:  Alert, Oriented, Normal sensory, Normal motor function, No focal deficits.    Psychiatric:  Cooperative, Appropriate mood & affect.       Review / Management   Results review:  All Results   8/31/2018 2:45 CDT       Total CK                  31 unit/L                             CK MB                     <0.5 ng/mL                             Troponin-I                <0.02 ng/mL    8/30/2018 19:30 CDT      UA Appear                 CLEAR                             UA Color                  DK YELLOW                             UA Spec Grav              1.027                             UA Bili                   Negative                             UA pH                     5.0                             UA Urobilinogen           0.2                             UA Blood                  Negative                             UA Glucose                Negative                             UA Ketones                Trace                             UA Protein                Negative                             UA Nitrite                Negative                             UA Leuk Est               Trace                             UA WBC                    NONE SEEN                             UA RBC                    NONE SEEN                             UA Bacteria               NONE SEEN /HPF                             UA Squam Epithelial       NONE SEEN    8/30/2018 17:00 CDT      WBC                       10.5 x10(3)/mcL                             RBC                       4.67 x10(6)/mcL                             Hgb                       13.7 gm/dL                             Hct                       42.6 %                             Platelet                  229 x10(3)/mcL                             MCV                       91.2 fL                             MCH                        29.3 pg                             MCHC                      32.2 gm/dL  LOW                             RDW                       13.2 %                             MPV                       12.1 fL                             Abs Neut                  7.60 x10(3)/mcL                             Neutro Auto               72 %  NA                             Lymph Auto                17 %  NA                             Mono Auto                 8 %  NA                             Eos Auto                  1 %  NA                             Abs Eos                   0.2 x10(3)/mcL                             Basophil Auto             0 %  NA                             Abs Neutro                7.60 x10(3)/mcL                             Abs Lymph                 1.8 x10(3)/mcL                             Abs Mono                  0.9 x10(3)/mcL                             Abs Baso                  0.0 x10(3)/mcL                             PT                        13.4 second(s)                             INR                       0.99                             PTT                       30.3 second(s)                             D-Dimer                   297 ng/ml FEU                             Sodium Lvl                138 mmol/L                             Potassium Lvl             4.2 mmol/L                             Chloride                  100 mmol/L                             CO2                       31.0 mmol/L                             Calcium Lvl               10.1 mg/dL                             Glucose Lvl               84 mg/dL                             BUN                       20.0 mg/dL  HI                             Creatinine                0.91 mg/dL                             eGFR-AA                   >60 mL/min/1.73 m2  NA                             eGFR-DEMARCUS                  >60 mL/min/1.73 m2  NA                             Bili Total                0.6 mg/dL                              Bili Direct               0.20 mg/dL                             Bili Indirect             0.40 mg/dL                             AST                       31 unit/L                             ALT                       24 unit/L                             Alk Phos                  91 unit/L                             Total Protein             7.8 gm/dL                             Albumin Lvl               4.20 gm/dL                             Globulin                  3.60 gm/dL  HI                             A/G Ratio                 1.2 ratio                             Troponin-I                <0.02 ng/mL  .    Radiology results   Rad Results Last 72 Hrs   Accession: [id number]  Order: XR Chest 2 Views  Report Dt/Tm: 08/30/2018 16:59  Report:      EXAM: XR Chest 2 Views     INDICATION: Chest Pain     TECHNIQUE: Frontal and lateral views of the chest are obtained.     COMPARISON: 5/22/2017     FINDINGS: Surgical changes overlie the mediastinum. The  cardiomediastinal silhouette is normal in size and contour. There is  mild left basal subsegmental atelectasis without pleural effusion or  pneumothorax. The osseous structures are diffusely osteopenic.        IMPRESSION:   No acute cardiopulmonary process identified.      , Rad Results Last 7 days   Accession: [id number]  Order: XR Chest 2 Views  Report Dt/Tm: 08/30/2018 16:59  Report:      EXAM: XR Chest 2 Views     INDICATION: Chest Pain     TECHNIQUE: Frontal and lateral views of the chest are obtained.     COMPARISON: 5/22/2017     FINDINGS: Surgical changes overlie the mediastinum. The  cardiomediastinal silhouette is normal in size and contour. There is  mild left basal subsegmental atelectasis without pleural effusion or  pneumothorax. The osseous structures are diffusely osteopenic.        IMPRESSION:   No acute cardiopulmonary process identified.      , Rad Results Last 10 Days   Accession: [id number]  Order: XR Chest 2  Views  Report Dt/Tm: 08/30/2018 16:59  Report:      EXAM: XR Chest 2 Views     INDICATION: Chest Pain     TECHNIQUE: Frontal and lateral views of the chest are obtained.     COMPARISON: 5/22/2017     FINDINGS: Surgical changes overlie the mediastinum. The  cardiomediastinal silhouette is normal in size and contour. There is  mild left basal subsegmental atelectasis without pleural effusion or  pneumothorax. The osseous structures are diffusely osteopenic.        IMPRESSION:   No acute cardiopulmonary process identified.            Impression and Plan   angina, typical  Coronary artery disease   Status post multiple PTCA/stents circumflex artery, LAD  Status post transmyocardial revascularization of the inferior lateral wall (bypass not done secondary to vessels being too small for bypass)4/26/2017  hypertension  Dyslipidemia      Plan:  Patient with typical angina symptoms, significant cardiac disease.  Reportedly she has not had a myocardial infarction, but given the typical nature of her symptoms as well as known significant coronary disease will proceed with left heart catheterization plus or minus PCI to evaluate for progression of known, significant disease.    Procedure risks, benefits, possible complications, alternatives to therapy, discussed at length with patient and family who is at the bedside as outlined on the consent form.  She is agreeable and is willing to proceed.  Further plan of care we determined upon review of angiographic findings

## 2022-04-30 NOTE — DISCHARGE SUMMARY
Patient:   Tiny Silverman            MRN: [id number]            FIN: [id number]               Age:   58 years     Sex:  Female     :  1960   Associated Diagnoses:   None   Author:   Marciano Saravia NP      Chief Complaint      History of Present Illness   This a 58-year-old female well known to [facility] seen in the clinic yesterday by Dr. Brown complains of chest tightness and squeezing for about the last 2 hours.  She noted some associated numbness to her left arm.  States she was sitting while at work.  She did take several nitroglycerin without relief of the chest pain.  She does note that she has had several episodes where it is worse with exertion.  She does note some mild associated shortness of breath, no diaphoresis, mild nausea, but notes radiation to both of her jaws.  Because of her symptoms she was directed to the emergency department for evaluation and subsequently was admitted.    At the present time she does not appear to be in any distress, but does note that her chest discomfort has not left.  She says it has maintained to be a dull, ache in the midsternal area, worse with movement, worse with exertion.    Past medical history:  Coronary artery disease  Hypertension  Peripheral arterial disease  Dyslipidemia  Anxiety    Past surgical history:  Appendectomy  Hysterectomy  Tonsillectomy  Nephrolithotomy with removal of calculi  Laparoscopy    Transmyocardial revascularization of the inferolateral and lateral walls were 2017   (Vessels too small for coronary bypass)  PTCA/MICHELLE LAD 2.75 x 16 3/3/2015  PTCA MICHELLE circumflex 2.5 x 32 and 2.5 x 12 3/20/2015  PTCA MICHELLE circumflex 30 by 38 2016  Laser atherectomy, PTCA/MICHELLE circumflex artery proximal 16    Left heart catheterization/peripheral angiography 2016   LM-Normal  LAD 30-40% patent proximal stent  Circumflex 70% proximal in-stent restenosis, 70% ostial stenosis PTA laser MICHELLE proximal circumflex  Obtuse marginal one 90%,  obtuse marginal 2 100% with left to right collaterals from a diagonal  RCA nondominant vessel 6070% mid vessel stenosis  EF normal    Peripheral angiogram revealed a 50% left common iliac stenosis      Social history:  Former smoker    Family history:  Father  age of 46 he had hypertension, coronary artery disease  Mother  age of 61 from a cardiac arrhythmia, she had hypertension  Brother with CABG, hypertension, CAD  Sr. age 32  secondary to cerebrovascular accident, had hypertension      Health Status   Allergies:    Allergies (2) Active Reaction  Penicillin was uanable to arouse  penicillins None Documented     Current medications:  (Selected)   Inpatient Medications  Ordered  Ambien: 10 mg, form: Tab, Oral, At Bedtime PRN for sleep, first dose 18 16:02:00 CDT  Benadryl: 25 mg, form: Cap, Oral, Once a day (at bedtime) PRN for insomnia, first dose 18 20:55:00 CDT, STAT  Benadryl: 25 mg, form: Cap, Oral, q4hr PRN for itching, first dose 18 20:55:00 CDT, STAT  Brilinta 90mg Tab: 90 mg, form: Tab, Oral, BID, first dose 18 21:00:00 CDT  KCL 20 mEq Oral Tab: 40 mEq, form: Tab-ER, Oral, Once, first dose 18 9:00:00 CDT, stop date 18 9:00:00 CDT  Lasix 40 mg oral tablet: 40 mg, form: Tab, Oral, Daily, first dose 18 9:00:00 CDT  Maalox Antacid with Anti-Gas: 30 mL, form: Susp, Oral, q4hr PRN for dyspepsia, first dose 18 20:55:00 CDT  Milk of Magnesia: 30 mL, form: Susp, Oral, Daily PRN for constipation, first dose 18 20:55:00 CDT  Norco 5 mg-325 mg oral tablet: 1 tab(s), form: Tab, Oral, q4hr PRN for pain, severe, first dose 18 20:55:00 CDT  Protonix: 40 mg, form: Tab-EC, Oral, Daily, first dose 18 16:02:00 CDT  Ranexa: 500 mg, form: Tab-ER, Oral, BID, first dose 18 17:11:00 CDT  Senokot S: 1 tab(s), form: Tab, Oral, BID PRN for constipation, first dose 18 20:55:00 CDT, choose only if unrelieved by Milk of Magnesia or choose first if  ordered alone  Sodium Chloride 0.9% intravenous solution 1,000 mL: 1,000 mL, 1,000 mL, IV, 100 mL/hr, start date 08/31/18 15:21:00 CDT  Tylenol No 3 oral tablet: 1 tab, form: Tab, Oral, q6hr PRN for pain, moderate, first dose 08/30/18 20:55:00 CDT  Tylenol: 1,000 mg, form: Tab, Oral, q6hr PRN for pain, mild, first dose 08/30/18 20:55:00 CDT, STAT, or fever; No more than 4 grams in 24 hours  Tylenol: 650 mg, form: Supp, WA, q6hr PRN for pain, first dose 08/30/18 20:55:00 CDT, STAT, mild or fever if patient unable to swallow; No more than 4 grams in 24 hours  Xanax: 0.25 mg, form: Tab, Oral, q6hr PRN for anxiety, first dose 08/30/18 20:55:00 CDT, STAT  Xylocaine Jelly 2% topical gel with applicator: 5 mL, form: Gel, TOP, Once PRN for other (see comment), first dose 08/30/18 20:55:00 CDT, for insertion of urinary catheter in males  aspirin 81 mg oral Delayed Release (EC) tablet: 81 mg, form: Tab-EC, Oral, Daily, first dose 09/01/18 9:00:00 CDT  atenolol 25 mg oral tablet: 25 mg, form: Tab, Oral, Daily, first dose 09/01/18 9:00:00 CDT  atorvastatin 40 mg oral tablet: 40 mg, form: Tab, Oral, Once a day (at bedtime), first dose 08/31/18 21:00:00 CDT  hydrALAZINE: 10 mg, form: Injection, IV Push, q2hr PRN for hypertension, first dose 08/30/18 20:55:00 CDT, STAT, SBP > 160mmHg or DBP > 100 mmHg, if HR < 60 or when BP does not respond to Labetolol  isosorbide MONOnitrate 60 mg oral tablet, Extended Release: 120 mg, form: Tab-ER, Oral, Daily, first dose 09/01/18 9:00:00 CDT  labetalol: 10 mg, form: Soln, IV Push, q1hr PRN for hypertension, first dose 08/30/18 20:55:00 CDT, STAT, SBP > 160mmHg or DBP > 110 mmHg, may repeat hourly as necessary if HR > 60  multivitamin with minerals (Adult Tab): 1 tab(s), form: Tab, Oral, Daily, first dose 08/31/18 16:10:00 CDT  nitroglycerin 2% transdermal ointment: 0.5 in, form: Ointment, TOP, q6hr, first dose 08/30/18 20:55:00 CDT  nitroglycerin: 0.4 mg, form: Tab-SL, SL, q5min PRN for chest  pain, Order duration: 3 dose(s), first dose 18 20:55:00 CDT, stop date Limited # of times, STAT, if systolic BP > 100 until pain relieved of at level 1  Pending Complete  midazolam: 2 mg, form: Injection, IV Push, q5min, Order duration: 2 dose(s), first dose 13 11:45:00 CST, stop date 13 11:54:00 CST, (up to 5 mg for moderate anxiety)  Prescriptions  Prescribed  Protonix 40 mg ORAL enteric coated tablet: 40 mg = 1 tab(s), Oral, Daily, # 30 tab(s), 11 Refill(s), Pharmacy: [facility] #[id number]  Ranexa 500 mg oral tablet, extended release: 500 mg = 1 tab(s), Oral, BID, # 60 tab(s), 11 Refill(s), Pharmacy: [facility]/pharmacy #[id number]  Documented Medications  Documented  Brilinta 90 mg oral tablet: 90 mg, Oral, BID, # 60 tab(s), 0 Refill(s)  ISOSORBIDE MONONIT  M mg = 1 tab(s), Oral, Daily  Lasix 40 mg oral tablet: 40 mg = 1 tab(s), Oral, Daily, # 30 tab(s), 0 Refill(s)  Nitrostat 0.4 mg sublingual tab: 0.4 mg = 1 tab(s), SL, q5min, PRN PRN angina  angina, # 30 tab(s), 0 Refill(s)  Zantac 150 oral tablet: 150 mg = 1 tab(s), Oral, Once a day (at bedtime), # 30 tab(s), 0 Refill(s)  Zofran ODT 8 mg oral tablet, disintegratin, SL, q6hr, PRN PRN nausea, 0 Refill(s)  aspirin 81 mg oral Delayed Release (EC) tablet: 81 mg = 1 tab(s), Oral, Daily, # 30 tab(s), 0 Refill(s)  atenolol 25 mg oral tablet: 25 mg = 1 tab(s), Oral, Daily, 0 Refill(s)  atorvastatin 40 mg oral tablet: 40 mg = 1 tab(s), Oral, Once a day (at bedtime), 0 Refill(s)  multivitamin with minerals (Adult Tab): 1 tab(s), Oral, Daily, # 30 tab(s), 0 Refill(s),    Medications (27) Active  Scheduled: (11)  aspirin 81 mg EC Tab  81 mg 1 tab(s), Oral, Daily  atenolol 50 mg Tab UD  25 mg 0.5 tab(s), Oral, Daily  atorvastatin 40 mg Tab  40 mg 1 tab(s), Oral, Once a day (at bedtime)  furosemide 40 mg Tab UD  40 mg 1 tab(s), Oral, Daily  isosorbide mononitrate 60 mg CR  120 mg 2 tab(s), Oral, Daily  MultiVit(THERAGRAN-M Tab) with Minerals Tab  1  tab(s), Oral, Daily  Nitroglycerin 2% OINTMENT-PKT  0.5 in, TOP, q6hr  pantoprazole 40 mg EC Tab  40 mg 1 tab(s), Oral, Daily  potassium chloride 20 mEq  ER Tab UD  40 mEq 2 tab(s), Oral, Once  ranolazine 500 mg oral ER Tab  500 mg 1 tab(s), Oral, BID  ticagrelor 90 mg Tab UD  90 mg 1 tab(s), Oral, BID  Continuous: (1)  sodium chloride 0.9% 1,000 mL  1,000 mL, IV, 100 mL/hr  PRN: (15)  acetaminophen 500 mg Tab  1,000 mg 2 tab(s), Oral, q6hr  acetaminophen 650 mg Sup UD  650 mg 1 supp, KS, q6hr  acetaminophen-codeine 300 mg-30 mg Tab UD  1 tab, Oral, q6hr  Al hydrox/Mg hydrox/simeth -400-40 mg/5 mL Kaitlynn UD  30 mL, Oral, q4hr  alprazolam 0.25 mg Tab UD  0.25 mg 1 tab(s), Oral, q6hr  diphenhydrAMINE 25 mg Cap UD  25 mg 1 cap(s), Oral, q4hr  diphenhydrAMINE 25 mg Cap UD  25 mg 1 cap(s), Oral, Once a day (at bedtime)  docusate-senna 50-8.6mg Tab UD  1 tab(s), Oral, BID  hydrALAzine 20 mg/ml Inj- 1 mL  10 mg 0.5 mL, IV Push, q2hr  hydrocodone 5mg/APAP 325 mg  1 tab(s), Oral, q4hr  labetalol 5 mg/mL 20mL vial  10 mg 2 mL, IV Push, q1hr  lidocaine topical 2% Jelly -5mL  5 mL, TOP, Once  magnesium hydroxide 8% Kaitlynn (MOM) UD  30 mL, Oral, Daily  Nitroglycerin 0.4 mg TAB (per 25's)  0.4 mg 1 tab(s), SL, q5min  zolpidem 5 mg Tab UD  10 mg 2 tab(s), Oral, At Bedtime     Problem list:    Active Problems (19)  Acid reflux   Anxiety and depression   CAD (coronary artery disease)   CAD (coronary atherosclerotic disease)   can lie flat   Dyslipidemia   elevated cholestrol   heart blockage   heart disease   Hyperlipidemia   Hypertension   Hypertension   Kidney stones   mild claustrophobia   Pain in right arm   Pseudoaneurysm   seasonal sinus allergies   SOB (shortness of breath)   Wears glasses         Histories   Past Medical History:    Active  Wears glasses ([id number])  seasonal sinus allergies ([id number])  Hypertension (53135624)  Comments:  3/5/2013 CST 11:45 KATIA - Kiet CORDOBA, Jaylin De La O. last seen 1/6/2013. last  EKG was in 2012  elevated cholestrol ([id number])  heart blockage ([id number])  Comments:  3/5/2013 CST 11:45 Jaylin Drummond RN  angiogram done in [place], [place]. first one done in 2002, then another in 2003  heart disease (61695882)  can lie flat (843970052)  SOB (shortness of breath) ([id number])  mild claustrophobia (01320148)  Kidney stones ([id number])  Comments:  3/5/2013 CST 11:47 Jaylin Drummond RN  one in 2011 and one currently present   Family History:    Stroke  Sister  CABG - Coronary artery bypass graft  Brother  Coronary artery disease  Father  Brother  Breast cancer  Mother  Coronary artery disease  Father  Brother  Hypertension.  Sister  Father  Mother  Brother  Cardiac arrhythmia.  Mother  Lung cancer  Mother     Procedure history:    Bypass CABG (.) on 4/26/2017 at 56 Years.  Comments:  4/26/2017 09:27 - Kina Huerta RN  auto-populated from documented surgical case  Aortogram Abdomen (None) on 1/6/2016 at 55 Years.  Comments:  1/6/2016 08:32 - Zhane Davison RN  auto-populated from documented surgical case  Intracoronary Stent Placement 1st Vessel (None) on 1/6/2016 at 55 Years.  Comments:  1/6/2016 08:Emmie - Zhane Davison RN  auto-populated from documented surgical case  Left Heart Cath w/COR Angio Ventriculography (None) on 1/6/2016 at 55 Years.  Comments:  1/6/2016 08:Emmie - Zhane Davison RN  auto-populated from documented surgical case  Placement of stent in cardiac conduit ([id number]) in the month of 3/2015 at 54 Years.  Cyst of breast. ([id number]) in 2002 at 42 Years.  Abdominal hysterectomy (988392073) in 1998 at 38 Years.  Total hysterectomy (874432518) on 7/26/1998 at 37 Years.  removal of uterus in 1988 at 28 Years.  Comments:  3/5/2013 11:50 - Jaylin Santa RN.  due to bleeding  Appendectomy (035447931) in 1977 at 17 Years.  Appendectomy (716769779) in 1976 at 16  Years.  Tonsillectomy (823796501) in 1962 at 2 Years.  Lingual tonsillectomy (71475508) in 1962 at 2 Years.  ectopic pregnancy.  Comments:  3/5/2013 11:50 - Jaylin Santa RN  years ago  laproscopic procedures due to endometreosis.  Comments:  3/5/2013 11:51 - Jaylni Santa RN  5years ago  ureteral stent insertion.  Hysterectomy ([id number]).  H/O nephrolithotomy with removal of calculi (0386633).   Social History        Social & Psychosocial Habits    Alcohol  03/27/2015  Use: Never    03/05/2013 Risk Assessment: Denies Alcohol Use    Employment/School  03/27/2015  Status: Employed    Activity level: Desk/Office    03/05/2013  Status: Employed    Highest education: Some college    Exercise  03/27/2015  Duration (average number of minutes): 0    Home/Environment  03/27/2015  Lives with: Children    Living situation: Home/Independent    03/05/2013  Lives with: family    Nutrition/Health  03/05/2013  Type of diet: Regular    Substance Abuse    Comment: patient denies - 03/27/2015 10:29 - Mansi Ernst LPN    03/05/2013 Risk Assessment: Denies Substance Abuse    Tobacco  04/09/2015 Risk Assessment: Medium Risk    05/07/2017  Use: Former smoker  .        Physical Examination   General:  Alert and oriented, No acute distress.    Eye:  Pupils are equal, round and reactive to light, Extraocular movements are intact.    HENT:  Normocephalic, Tympanic membranes are clear.    Neck:  Supple, Non-tender, No carotid bruit, No jugular venous distention.    Respiratory:  Lungs are clear to auscultation, Respirations are non-labored, Breath sounds are equal.    Cardiovascular:  Normal rate, Regular rhythm, Normal peripheral perfusion, No edema.    Gastrointestinal:  Soft, Non-tender, Normal bowel sounds.       Vital Signs (last 24 hrs)_____  Last Charted___________  Temp Oral     36.9 DegC  (SEP 01 07:36)  Heart Rate Peripheral   64 bpm  (SEP 01 07:36)  Resp Rate         16 br/min  (SEP 01 04:13)  SBP      96 mmHg  (SEP 01  07:36)  DBP      60 mmHg  (SEP 01 07:36)  SpO2      94 %  (SEP 01 07:36)  Weight      80.3 kg  (SEP 01 06:00)     Measurements from flowsheet : Measurements   9/1/2018 6:00 CDT        Weight Measured           80.3 kg    8/31/2018 3:00 CDT       Weight Dosing             74 kg                             Weight Measured and Calculated in Lbs     163.14 lb                             Height/Length Dosing      165.1 cm     Genitourinary:  No costovertebral angle tenderness.    Musculoskeletal:  Normal range of motion, Normal strength.    Integumentary:  Warm, Dry, Pink.    Neurologic:  Alert, Oriented, Normal sensory, Normal motor function, No focal deficits.    Psychiatric:  Cooperative, Appropriate mood & affect.       Review / Management   Results review:     Labs (Last four charted values)  WBC                  7.6 (SEP 01) 10.5 (AUG 30)   Hgb                  L 10.3 (SEP 01) 13.7 (AUG 30)   Hct                  L 32.1 (SEP 01) 42.6 (AUG 30)   Plt                  155 (SEP 01) 229 (AUG 30)   Na                   140 (SEP 01) 138 (AUG 30)   K                    L 3.3 (SEP 01) 4.2 (AUG 30)   CO2                  25.0 (SEP 01) 31.0 (AUG 30)   Cl                   H 108 (SEP 01) 100 (AUG 30)   Cr                   0.69 (SEP 01) 0.91 (AUG 30)   BUN                  10.0 (SEP 01) H 20.0 (AUG 30)   Glucose Random       H 120 (SEP 01) 84 (AUG 30)   PT                   13.4 (AUG 30)   INR                  0.99 (AUG 30)   PTT                  28.5 (AUG 31) 30.3 (AUG 30) .    Laboratory Results   Today's Lab Results : PowerNote Discrete Results   9/1/2018 5:11 CDT        WBC                       7.6 x10(3)/mcL                             RBC                       3.51 x10(6)/mcL  LOW                             Hgb                       10.3 gm/dL  LOW                             Hct                       32.1 %  LOW                             Platelet                  155 x10(3)/mcL                             MCV                        91.5 fL                             MCH                       29.3 pg                             MCHC                      32.1 gm/dL  LOW                             RDW                       13.2 %                             MPV                       12.4 fL                             Abs Neut                  5.07 x10(3)/mcL                             Neutro Auto               66 %  NA                             Lymph Auto                21 %  NA                             Mono Auto                 11 %  NA                             Eos Auto                  2 %  NA                             Abs Eos                   0.1 x10(3)/mcL                             Basophil Auto             0 %  NA                             Abs Neutro                5.07 x10(3)/mcL                             Abs Lymph                 1.6 x10(3)/mcL                             Abs Mono                  0.8 x10(3)/mcL                             Abs Baso                  0.0 x10(3)/mcL                             Sodium Lvl                140 mmol/L                             Potassium Lvl             3.3 mmol/L  LOW                             Chloride                  108 mmol/L  HI                             CO2                       25.0 mmol/L                             Calcium Lvl               8.9 mg/dL                             Glucose Lvl               120 mg/dL  HI                             BUN                       10.0 mg/dL                             Creatinine                0.69 mg/dL                             eGFR-AA                   >60 mL/min/1.73 m2  NA                             eGFR-DEMARCUS                  >60 mL/min/1.73 m2  NA                             Bili Total                0.4 mg/dL                             Bili Direct               0.10 mg/dL                             Bili Indirect             0.30 mg/dL                             AST                       18  unit/L                             ALT                       15 unit/L                             Alk Phos                  72 unit/L                             Total Protein             5.5 gm/dL  LOW                             Albumin Lvl               2.80 gm/dL  LOW                             Globulin                  2.70 gm/dL                             A/G Ratio                 1.0 ratio  LOW        Radiology results   Rad Results Last 72 Hrs   Accession: [id number]  Order: XR Chest 2 Views  Report Dt/Tm: 08/30/2018 16:59  Report:      EXAM: XR Chest 2 Views     INDICATION: Chest Pain     TECHNIQUE: Frontal and lateral views of the chest are obtained.     COMPARISON: 5/22/2017     FINDINGS: Surgical changes overlie the mediastinum. The  cardiomediastinal silhouette is normal in size and contour. There is  mild left basal subsegmental atelectasis without pleural effusion or  pneumothorax. The osseous structures are diffusely osteopenic.        IMPRESSION:   No acute cardiopulmonary process identified.      , Rad Results Last 7 days   Accession: [id number]  Order: XR Chest 2 Views  Report Dt/Tm: 08/30/2018 16:59  Report:      EXAM: XR Chest 2 Views     INDICATION: Chest Pain     TECHNIQUE: Frontal and lateral views of the chest are obtained.     COMPARISON: 5/22/2017     FINDINGS: Surgical changes overlie the mediastinum. The  cardiomediastinal silhouette is normal in size and contour. There is  mild left basal subsegmental atelectasis without pleural effusion or  pneumothorax. The osseous structures are diffusely osteopenic.        IMPRESSION:   No acute cardiopulmonary process identified.      , Rad Results Last 10 Days   Accession: [id number]  Order: XR Chest 2 Views  Report Dt/Tm: 08/30/2018 16:59  Report:      EXAM: XR Chest 2 Views     INDICATION: Chest Pain     TECHNIQUE: Frontal and lateral views of the chest are obtained.     COMPARISON: 5/22/2017     FINDINGS: Surgical changes overlie the  mediastinum. The  cardiomediastinal silhouette is normal in size and contour. There is  mild left basal subsegmental atelectasis without pleural effusion or  pneumothorax. The osseous structures are diffusely osteopenic.        IMPRESSION:   No acute cardiopulmonary process identified.            Impression and Plan   angina, typical  Coronary artery disease   Status post multiple PTCA/stents circumflex artery, LAD  Status post transmyocardial revascularization of the inferior lateral wall (bypass not done secondary to vessels being too small for bypass)4/26/2017  hypertension  Dyslipidemia      Plan:  Discharged to home today (Discussed with Dr Moody)  Continue with aspirin and Brilinta  Add Protonix  Diet cardiac  Activity as tolerates  Follow-up with primary care provider for possible GI eval  Follow-up with [facility]    Summary:  Patient admitted to [facility] secondary to chest pain/epigastric pain.  The patient does have a history of coronary artery disease in including PCI as well as coronary artery bypass graft.  Given the fact that her symptomatology was consistent with typical angina.  She elected to undergo left heart catheterization.  Noted patent stents and nonobstructive coronary artery disease.  She will be discharged home today in stable condition to follow-up with [facility].  She has been started on Protonix for possible GI involvement.  She will follow-up with her primary care doctor review.

## 2022-04-30 NOTE — ED PROVIDER NOTES
Patient:   Tiny Silverman            MRN: [id number]            FIN: [id number]               Age:   58 years     Sex:  Female     :  1960   Associated Diagnoses:   Chest pain   Author:   Jaret Browne      Basic Information   Time seen: Date & time 2018 16:28:00.   History source: Patient.   History limitation: None.      History of Present Illness   The patient presents with 57 yo F who presents with CC of chest pain that started at 1300.  A sheldon pac .  The onset was 2018 11:00:00 .  The course/duration of symptoms is fluctuating in intensity.  Location: Anterior chest. Radiating pain: none. The character of symptoms is sharp.  The degree at onset was moderate.  The degree at maximum was moderate.  The degree at present is moderate.  The exacerbating factor is breathing.  The relieving factor is none.  Risk factors consist of coronary artery disease.  Prior episodes: angina.  Therapy today None.  Associated symptoms: none.        Review of Systems   Constitutional symptoms:  No fever, no chills.    Respiratory symptoms:  No shortness of breath, no cough.    Cardiovascular symptoms:  Chest pain, No palpitations,    Gastrointestinal symptoms:  No vomiting, no diarrhea.    Musculoskeletal symptoms:  No back pain,    Neurologic symptoms:  No altered level of consciousness, no weakness.              Additional review of systems information: All other systems reviewed and otherwise negative.      Health Status   Allergies:    Allergic Reactions (Selected)  Severity Not Documented  Penicillins- No reactions were documented.  Nonallergic Reactions (Selected)  Severity Not Documented  Penicillin- Was uanable to arouse..   Medications:  (Selected)   Inpatient Medications  Pending Complete  midazolam: 2 mg, form: Injection, IV Push, q5min, Order duration: 2 dose(s), first dose 13 11:45:00 CST, stop date 13 11:54:00 CST, (up to 5 mg for moderate anxiety)  Documented  Medications  Documented  Brilinta 90 mg oral tablet: 90 mg, Oral, BID, # 60 tab(s), 0 Refill(s)  ISOSORBIDE MONONIT  M mg = 1 tab(s), Oral, Daily  Nitrostat 0.4 mg sublingual tab: 0.4 mg = 1 tab(s), SL, q5min, PRN PRN angina  angina, # 30 tab(s), 0 Refill(s)  Zantac 150 oral tablet: 150 mg = 1 tab(s), Oral, Once a day (at bedtime), # 30 tab(s), 0 Refill(s)  atenolol 25 mg oral tablet: 25 mg = 1 tab(s), Oral, Daily, 0 Refill(s)  atorvastatin 40 mg oral tablet: 40 mg = 1 tab(s), Oral, Once a day (at bedtime), 0 Refill(s), per nurse's notes.   Immunizations: Per nurse's notes.   Menstrual history: Per nurse's notes.      Past Medical/ Family/ Social History   Medical history:    Active  Wears glasses ([id number])  seasonal sinus allergies ([id number])  Hypertension (94943007)  Comments:  3/5/2013 CST 11:45 Jaylin Drummond RN  sees . last seen 2013. last EKG was in   elevated cholestrol (950736584)  heart blockage (5483136936)  Comments:  3/5/2013 CST 11:45 Jaylin Drummond RN  angiogram done in [place], [place]. first one done in , then another in   heart disease (35280328)  can lie flat (712860437)  SOB (shortness of breath) ([id number])  mild claustrophobia (56691199)  Kidney stones ([id number])  Comments:  3/5/2013 CST 11:47 Jaylin Drummond RN  one in  and one currently present, Reviewed as documented in chart.   Surgical history:    Bypass CABG (.) on 2017 at 56 Years.  Comments:  2017 09:27 - Kina Huerta RN  auto-populated from documented surgical case  Aortogram Abdomen (None) on 2016 at 55 Years.  Comments:  2016 08:32 - Zhane Davison RN  auto-populated from documented surgical case  Intracoronary Stent Placement 1st Vessel (None) on 2016 at 55 Years.  Comments:  2016 08:32 - Zhane Davison RN  auto-populated from documented surgical case  Left Heart Cath w/COR Angio  Ventriculography (None) on 1/6/2016 at 55 Years.  Comments:  1/6/2016 08:32 - Laney CORDOBA, Zhane Travis  auto-populated from documented surgical case  Placement of stent in cardiac conduit ([id number]) in the month of 3/2015 at 54 Years.  Cyst of breast. ([id number]) in 2002 at 42 Years.  Abdominal hysterectomy (182027576) in 1998 at 38 Years.  Total hysterectomy (803776253) on 7/26/1998 at 37 Years.  removal of uterus in 1988 at 28 Years.  Comments:  3/5/2013 11:50 - Jaylin Santa RN  due to bleeding  Appendectomy (421194231) in 1977 at 17 Years.  Appendectomy (364273129) in 1976 at 16 Years.  Tonsillectomy (758746775) in 1962 at 2 Years.  Lingual tonsillectomy (59008557) in 1962 at 2 Years.  ectopic pregnancy.  Comments:  3/5/2013 11:50 - Jaylin Santa RN  years ago  laproscopic procedures due to endometreosis.  Comments:  3/5/2013 11:51 - Jaylin Santa RN  5years ago  ureteral stent insertion.  Hysterectomy ([id number]).  H/O nephrolithotomy with removal of calculi (0790573)., Reviewed as documented in chart.   Family history:    Stroke  Sister  CABG - Coronary artery bypass graft  Brother  Coronary artery disease  Father  Brother  Breast cancer  Mother  Coronary artery disease  Father  Brother  Hypertension.  Sister  Father  Mother  Brother  Cardiac arrhythmia.  Mother  Lung cancer  Mother  , Reviewed as documented in chart.   Social history: Reviewed as documented in chart.   Problem list: Per nurse's notes.      Physical Examination   General:  Alert, no acute distress, well hydrated, Skin: Normal for ethnicity.    Skin:  Warm, dry, pink, intact.    Head:  Normocephalic.   Neck:  Supple, no tenderness, full range of motion.    Eye:  Pupils are equal, round and reactive to light, extraocular movements are intact, normal conjunctiva.    Ears, nose, mouth and throat:  Oral mucosa moist.   Cardiovascular:  Regular rate and rhythm, No murmur, Normal peripheral perfusion.    Respiratory:  Lungs  are clear to auscultation, breath sounds are equal, Respirations: not tachypneic, not labored, not shallow, Retractions: None.    Chest wall:  No tenderness.   Back:  Normal range of motion, Normal alignment, No costovertebral angle tenderness,    Musculoskeletal:  Normal ROM, normal strength, no swelling, no deformity.    Gastrointestinal:  Soft, Nontender, Non distended, Normal bowel sounds.    Neurological:  Alert and oriented to person, place, time, and situation, No focal neurological deficit observed, normal sensory observed, normal motor observed, normal speech observed, normal coordination observed, Gait: Normal.    Psychiatric:  Cooperative, appropriate mood & affect, normal judgment.       Medical Decision Making   Documents reviewed:  Emergency department nurses' notes.   Orders  Launch Orders   Laboratory:  Troponin-I (Order): Stat collect, 8/30/2018 16:29 CDT, Blood, Lab Collect, Print Label By Order Location, 8/30/2018 16:29 CDT  Urinalysis Complete a reflex to culture (Order): Stat collect, Urine, 8/30/2018 16:29 CDT, Nurse collect, Print Label By Order Location  CMP (Order): Stat collect, 8/30/2018 16:29 CDT, Blood, Lab Collect, Print Label By Order Location, 8/30/2018 16:29 CDT  CBC w/ Auto Diff (Order): Now collect, 8/30/2018 16:29 CDT, Blood, Lab Collect, Print Label By Order Location, 8/30/2018 16:29 CDT  Radiology:  XR Chest 2 Views (Order): Stat, 8/30/2018 16:29 CDT, Chest Pain, None, Ambulatory, Rad Type, Not Scheduled, Launch Orders   Laboratory:  D-Dimer (Order): Stat collect, 8/30/2018 20:55 CDT, Blood, Lab Collect, Print Label By Order Location, 8/30/2018 20:55 CDT  , Launch Orders   Laboratory:  PT (Order): Stat collect, 8/30/2018 20:55 CDT, Blood, Lab Collect, Print Label By Order Location, 8/30/2018 20:55 CDT  PTT (Order): Stat collect, 8/30/2018 20:55 CDT, Blood, Lab Collect, Print Label By Order Location, 8/30/2018 20:55 CDT  .       Impression and Plan   Diagnosis   Chest pain  ([id number] R07.9)      Calls-Consults   -  8/30/2018 20:12:00 , Mp Olivo MD, recommends Spoke to Mattie and wants admit.    Plan   Condition: Stable.    Disposition: Admit time  8/30/2018 20:19:00, Place in Observation Telemetry Unit.    Counseled: Patient, Regarding diagnosis, Regarding diagnostic results, Regarding treatment plan, Patient indicated understanding of instructions.    Orders: Launch Orders   Admit/Transfer/Discharge:  Admit to Outpatient Observation (Order): 8/30/2018 20:20 CDT, Mp Olivo MD [facility] Telemetry, No  .

## 2022-04-30 NOTE — OP NOTE
DATE OF SURGERY:    12/21/2018    SURGEON:  Erasmo Cardona MD    PREPROCEDURE DIAGNOSIS:  Chronic cholecystitis.    POSTPROCEDURE DIAGNOSIS:  Chronic cholecystitis.    PROCEDURE PERFORMED:  Laparoscopic cholecystectomy.    ANESTHESIA:  General endotracheal.    DESCRIPTION OF PROCEDURE:  Patient was transferred to the operating room, placed on the operating room table in supine position.  General anesthetic was administered per Anesthesia.  The patient tolerated this well.  Abdomen was prepped with chlorhexidine solution, draped with sterile drapes and cloths.  Time-out was taken to verify correct patient and procedure.  Preoperative antibiotic was given.     After time-out was taken, Gelpi clamp was placed in the umbilicus.  The umbilicus was raised.  The area in the supraumbilical space was infiltrated with 0.5% Marcaine with epinephrine.  A 5 mm transverse incision was made with an 11 blade.  Veress needle was inserted into the abdomen.  The abdomen was insufflated to 15 mmHg CO2 and patient tolerated this well.  Veress was removed.  A 5 mm Visiport was placed through this incision into the abdominal cavity under direct vision.  No Veress needle injury was noted.  Patient was positioned with head up left side down to expose the right upper quadrant.  Three additional 5 mm ports were placed in the subxiphoid and right subcostal space under direct vision.  The patient was noted to have Ysuh-Yiyz-Zmaoxb syndrome in the right upper quadrant.  These adhesions were taken down with sharp dissection, appeared to be hemostatic.  This pressure on the liver capsule was then released.  At that point, the gallbladder fundus was visualized.  It was grasped.  The patient was noted to have moderate omental adhesions to the fundus, body and infundibulum of the gallbladder.  These were taken down with countertraction and hook electrocautery.  The fundus was grasped and retracted towards the right shoulder.  Infundibulum was  grasped and retracted towards the right lower quadrant.  The peritoneum overlying the cystic duct and cystic artery was incised medially to laterally using hook electrocautery.  Cystic artery and cystic duct were completely and circumferentially dissected with [place] dissection and hook electrocautery.  Gallbladder was further taken up off the cystic plate to expose critical view of the cystic artery and cystic duct as the only 2 structures entering the gallbladder.  At that point, the cystic duct was doubly clipped proximally with 5 mm automatic clip applier, singly clipped distally, transected with laparoscopic scissors.  Cystic artery was taken in similar fashion.  Gallbladder was removed from the gallbladder fossa using hook electrocautery.  The gallbladder wall was noted to be moderately thickened consistent with chronic cholecystitis changes.  It was completely removed from the hepatic fossa, placed into a 5 mm endoscopic retrieval bag and removed through the 5 mm subxiphoid port without difficulty.  Hemostasis was assured in the gallbladder fossa using hook electrocautery.  It all appeared hemostatic.  After this was complete, the cystic artery and cystic duct stump clips were viewed appeared to be intact without bile leakage or bleeding.  Sandhu's pouch and subdiaphragmatic space were irrigated with saline solution.  All irrigant returned clear.  Patient was flattened out, all remaining irrigant was suctioned out of Morison's pouch without difficulty.  At this point, 5 mm ports were removed under direct vision.  No bleeding was noted.  Abdomen was allowed to desufflate and the supraumbilical port was removed.  Subcutaneous spaces were irrigated with saline solution.  All skin incisions were closed with simple interrupted inverted 4-0 Monocryl stitches and Dermabond was placed to the skin for dressing.  Patient tolerated the procedure well.  All needle and sponge counts were correct.    COMPLICATIONS:   None.    SPECIMENS:  Gallbladder.    ESTIMATED BLOOD LOSS:  Less than 10 cc.    DISPOSITION:  With her extensive heart history and history of CABG and multiple stents, Anesthesia recommends prolonged extended recovery to ensure that the patient continues to do well from a cardiac standpoint.  She will be placed in overnight observation and watched for an extended period of time per Anesthesia request.        ______________________________  MD MAEGAN Bravo/CONCEPCION  DD:  12/21/2018  Time:  09:02AM  DT:  12/21/2018  Time:  09:31AM  Job #:  [id number]

## 2022-05-14 NOTE — ED PROVIDER NOTES
"   Patient:   Tiny Silverman            MRN: [id number]            FIN: [id number]               Age:   61 years     Sex:  Female     :  1960   Associated Diagnoses:   Chest pain; CAD (coronary artery disease)   Author:   Richard JEFFERSON MD, Lino B      Basic Information   Time seen: Date & time 2022 09:49:00.   History source: Patient, daughter.   Arrival mode: Private vehicle.   History limitation: None.   Additional information: Patient's physician(s): Travis BOYD, Pallavi Crystal MD, Mp, Chief Complaint from Nursing Triage Note : Chief Complaint   2022 9:40 CST       Chief Complaint           c/o indigestion, then LUQ abd cramping. took antacid without relief. then took a nitro with relief. scheduled for an angiogram wednesday. sees dr Olivo  .      History of Present Illness   The patient presents with chest pain,   61 year old female presents to ED for upper abdominal pain/chest pain and indigestion that began this AM; Patient reports no relief with antacid- took nitro with relief; scheduled for angiogram Wed with ZOE Douglas  and   I, Dr. Ramos, assumed care of this patient upon walking into the room at 1101.    62 y/o CF with a history of HTN, PAD, and CAD s/p multiple cardiac stents, presents to the ED with daughter at bedside complaining of left-sided chest pain that started around 0830 this morning. The pt reports that she went to work and began feeling dizzy, lightheaded, "heartburn," nausea, and "sharp" let-sided chest pain. Around 0845, the pt took NTG x1, but began experiencing a headache that would not subside, so she presented to the ED. She states that she has experienced this type of chest pain in the past when she needed cardiac stents. The pt notes that she is chronically SOB, but the SOB worsened today with the chest pain. Upon exam, she notes that the chest pain has improved to a mild "ache." She has an angiogram scheduled on Wednesday. Denies any " other complains..  The onset was 1/28/2022 08:30:00 .  The course/duration of symptoms is improving.  Location: Left chest. Radiating pain: none. The character of symptoms is achy, sharp and burning.  The degree at onset was moderate.  The degree at maximum was moderate.  The degree at present is minimal.  The exacerbating factor is none.  The relieving factor is nitroglycerin.  Risk factors consist of coronary artery disease and hypertension.  Prior episodes: coronary artery disease.  Therapy today Nitroglycerin.  Associated symptoms: shortness of breath and nausea.        Review of Systems   Constitutional symptoms:  lightheaded, no fever, no chills, no sweats, no weakness.    Skin symptoms:  No rash,    Eye symptoms:  No recent vision problems   ENMT symptoms:  No ear pain,    Respiratory symptoms:  Shortness of breath, no orthopnea   Cardiovascular symptoms:  Chest pain, no palpitations   Gastrointestinal symptoms:  Nausea, no abdominal pain, no vomiting, no diarrhea   Genitourinary symptoms:  No dysuria, no hematuria.    Musculoskeletal symptoms:  No back pain, no Muscle pain.    Psychiatric symptoms:  No anxiety, no depression.    Allergy/immunologic symptoms:  No seasonal allergies, no food allergies             Additional review of systems information: All other systems reviewed and otherwise negative.      Health Status   Allergies:    Nonallergic Reactions (Selected)  Severity Not Documented  Penicillin- Was uanable to arouse..   Medications:  (Selected)   Inpatient Medications  Pending Complete  midazolam: 2 mg, form: Injection, IV Push, q5min, Order duration: 2 dose(s), first dose 03/06/13 11:45:00 CST, stop date 03/06/13 11:54:00 CST, (up to 5 mg for moderate anxiety)  Prescriptions  Prescribed  Flonase 50 mcg/inh nasal spray: 2 spray(s), Nasal, Daily, # 1 EA, 0 Refill(s), Pharmacy: [facility]/pharmacy #[id number], 165, cm, Height/Length Dosing, 06/26/21 14:59:00 CDT, 81.6, kg, Weight Dosing, 06/26/21 14:59:00  CDT  Xyzal 5 mg oral tablet: 5 mg = 1 tab(s), Oral, qPM, # 14 tab(s), 0 Refill(s), Pharmacy: [facility]/pharmacy #[id number], 165, cm, Height/Length Dosing, 06/26/21 14:59:00 CDT, 81.6, kg, Weight Dosing, 06/26/21 14:59:00 CDT  escitalopram 20 mg oral tablet: See Instructions, TAKE 1 TABLET BY MOUTH EVERY DAY, # 90 tab(s), 5 Refill(s), Pharmacy: [facility] STORE [id number], 165, cm, Height/Length Dosing, 06/26/21 14:59:00 CDT, 81.6, kg, Weight Dosing, 06/26/21 14:59:00 CDT  ondansetron 4 mg oral tablet, disintegrating: See Instructions, DISSOLVE 1 TABLET UNDER TONGUE EVERY 4 HOURS AS NEEDED FOR NAUSEA, # 30 tab(s), 0 Refill(s), Pharmacy: [facility] STORE [id number], 165, cm, Height/Length Dosing, 06/26/21 14:59:00 CDT, 81.6, kg, Weight Dosing, 06/26/21 14:59:00 CDT  Documented Medications  Documented  Aspir-Low 81 mg oral delayed release tablet: 81 mg = 1 tab(s), Oral, Daily, 0 Refill(s)  Brilinta (ticagrelor) 90 mg oral tablet: 90 mg = 1 tab(s), Oral, BID  Calcium, Magnesium and Zinc oral tablet: 1 tab(s), Oral, Daily, # 30 tab(s), 0 Refill(s)  Nitrostat 0.4 mg sublingual tab: 0.4 mg = 1 tab(s), SL, q5min, PRN PRN angina  angina, # 30 tab(s), 0 Refill(s)  Vitamin B Complex oral capsule: 1 cap(s), Oral, Daily, # 30 cap(s), 0 Refill(s)  Vitamin D3 2000 intl units (50 mcg) oral capsule: 2,000 IntUnit = 1 cap(s), Oral, Daily, # 60 cap(s), 0 Refill(s)  atenolol 25 mg oral tablet: 25 mg = 1 tab(s), Oral, Daily, 0 Refill(s)  atorvastatin 40 mg oral tablet: 40 mg = 1 tab(s), Oral, Once a day (at bedtime), 0 Refill(s)  ezetimibe 10 mg oral tablet: 10 mg = 1 tab(s), Oral, Daily  lactobacillus casei: probiotic blend OTC 1 tablet daily, 0 Refill(s)  lysine 500 mg oral tablet: 500 mg = 1 tab(s), Oral, Daily, 0 Refill(s)  multivitamin with minerals (Adult Tab): 1 tab(s), Oral, Daily, # 30 tab(s), 0 Refill(s)  ranolazine 1000 mg oral tablet, extended release: 1000 mg = 1 tab(s), Oral, BID.      Past Medical/ Family/ Social History   Medical history:     Active  seasonal sinus allergies ([id number])  Hypertension ([id number])  Comments:  3/5/2013 CST 11:45 Jaylin Drummond RN  sees . last seen 1/6/2013. last EKG was in 2012  SOB (shortness of breath) ([id number])  mild claustrophobia ([id number])  Kidney stones ([id number])  Comments:  3/5/2013 CST 11:47 Jaylin Drummond RN  one in 2011 and one currently present  Resolved  heart blockage ([id number]):  Resolved.  Comments:  3/5/2013 CST 11:45 Jaylin Drummond RN  angiogram done in [place], [place]. first one done in 2002, then another in 2003  Pancreatitis ([id number]):  Resolved.,   PAD.   Surgical history:    Stent placement ([id number]) on 6/7/2021 at 60 Years.  Comments:  6/26/2021 14:54 Broderick Martinez LPN  cardiac exterior iliac artery  Cholecystectomy Laparoscopic (.) on 12/21/2018 at 58 Years.  Comments:  12/21/2018 8:59 Miguelina Winston RN  auto-populated from documented surgical case  Bypass CABG (.) on 4/26/2017 at 56 Years.  Comments:  4/26/2017 9:27 Kina Rm RN  auto-populated from documented surgical case  Aortogram Abdomen (None) on 1/6/2016 at 55 Years.  Comments:  1/6/2016 8:32 Zhane Richardson RN  auto-populated from documented surgical case  Intracoronary Stent Placement 1st Vessel (None) on 1/6/2016 at 55 Years.  Comments:  1/6/2016 8:32 Zhane Richardson RN  auto-populated from documented surgical case  Left Heart Cath w/COR Angio Ventriculography (None) on 1/6/2016 at 55 Years.  Comments:  1/6/2016 8:32 Zhane Richardson RN  auto-populated from documented surgical case  Placement of stent in cardiac conduit ([id number]) in the month of 3/2015 at 54 Years.  Cyst of breast. ([id number]) in 2002 at 42 Years.  Total hysterectomy ([id number]) on 7/26/1998 at 37 Years.  Appendectomy ([id number]) in 1977 at 17 Years.  Tonsillectomy ([id number]) in 1962 at 2  Years.  ectopic pregnancy.  Comments:  3/5/2013 11:50 Jaylin Drummond RN  years ago  laproscopic procedures due to endometreosis.  Comments:  3/5/2013 11:51 Jaylin Drummond RN  5years ago  ureteral stent insertion.  H/O nephrolithotomy with removal of calculi ([id number])..   Family history:    Stroke  Sister  CABG - Coronary artery bypass graft  Brother  Coronary artery disease  Father  Brother  Breast cancer  Mother  Coronary artery disease  Father  Brother  Hypertension.  Sister  Father  Mother  Brother  Cardiac arrhythmia.  Mother  Lung cancer  Mother  .   Social history: Alcohol use: Denies, Tobacco use: Denies, Drug use: Denies.      Physical Examination               Vital Signs   Vital Signs   1/28/2022 9:40 CST       Temperature Temporal Artery               36.1 DegC  LOW                             Peripheral Pulse Rate     52 bpm  LOW                             Respiratory Rate          19 br/min                             SpO2                      100 %                             Oxygen Therapy            Room air                             Systolic Blood Pressure   141 mmHg  HI                             Diastolic Blood Pressure  66 mmHg  .   Basic Oxygen Information   1/28/2022 9:40 CST       SpO2                      100 %                             Oxygen Therapy            Room air  .   General:  Alert, anxious   Neurological:  Alert and oriented to person, place, time, and situation, No focal neurological deficit observed, CN II-XII intact, normal sensory observed, normal motor observed, normal speech observed   Skin:  Warm, pink, intact, moist, no rash   Head:  Normocephalic, atraumatic   Cardiovascular:  Regular rate and rhythm, No murmur, Normal peripheral perfusion, No edema   Respiratory:  Lungs are clear to auscultation, respirations are non-labored, breath sounds are equal, Symmetrical chest wall expansion.    Musculoskeletal:  Normal ROM, normal strength   Gastrointestinal:   Soft, Nontender, Non distended, Normal bowel sounds   Lymphatics:  No lymphadenopathy.   Psychiatric:  Cooperative, normal judgment, Mood and affect: Anxious.       Medical Decision Making   Differential Diagnosis:  Unstable angina, angina, atypical chest pain, pleurisy, chest wall pain.    Rationale:  62 yo with history of extensive CAD s/p stenting, HTN, patient of Dr. Judith GUTIERREZ presenting for L sided stabbing chest pain - states feels like previous MI. Well appearing, EKG non ischemic, first troponin negative. Due to significant cardiac history, elevated heart score, will admit to [facility] for further workup. Loaded with .   Documents reviewed:  Emergency department nurses' notes, emergency department records, prior records.    Orders  Launch Orders   Laboratory:  Magnesium Level (Order): Stat collect, 1/28/2022 9:50 CST, Blood, Lab Collect, Print Label By Order Location, 1/28/2022 9:50 CST  BNP (Order): Stat collect, 1/28/2022 9:50 CST, Blood, Lab Collect, Print Label By Order Location, 1/28/2022 9:50 CST  CBC w/ Auto Diff (Order): Now collect, 1/28/2022 9:50 CST, Blood, Lab Collect, Print Label By Order Location, 1/28/2022 9:50 CST  CMP (Order): Stat collect, 1/28/2022 9:50 CST, Blood, Lab Collect, Print Label By Order Location, 1/28/2022 9:50 CST  POC iSTAT ER Troponin request (Order): Blood, Stat collect, 1/28/2022 9:50 CST by Irving Mirza PA-C, Lab Collect, Print Label By Order Location  Troponin-I (Order): Stat collect, 1/28/2022 9:50 CST, Blood, Lab Collect, Print Label By Order Location, 1/28/2022 9:50 CST  Pharmacy:  aspirin 81 mg oral tablet, CHEWABLE (Order): 324 mg, form: Tab-Chew, Oral, Once, first dose 1/28/2022 9:50 CST, stop date 1/28/2022 9:50 CST, STAT, 4 chew tab = 5 grain ASA  Radiology:  XR Chest 2 Views (Order): Stat, 1/28/2022 9:50 CST, Chest Pain, None, Wheelchair, Patient Has IV?, Rad Type, Not Scheduled  Cardiology:  EKG (Order): 1/28/2022 9:50 CST, Stat, Once, 1/28/2022 9:50 CST,  Wheelchair, Patient Has IV, Standard Precautions, -1, -1, 2022 9:50 CST, Launch Orders   Laboratory:  PTT (Order): Stat collect, 2022 9:51 CST, Blood, Lab Collect, Print Label By Order Location, 2022 9:51 CST  PT (Protime) (Order): Stat collect, 2022 9:50 CST, Blood, Lab Collect, Print Label By Order Location, 2022 9:50 CST, launch Order Profile (Selected)   Inpatient Orders  Ordered (Collected)  POC iSTAT ER Troponin request: BLOOD, STAT collect, Collected, 22 9:58:50 CST, Stop date 22 9:58:00 CST, Lab Collect, Print Label By Order Location  Ordered (In-Lab)  Blood Smear Microscopic Exam: NOW collect, 22 9:58:00 CST, Blood, Collected, Stop date 22 9:58:00 CST, Lab Collect, Print Label By Order Location, 22 9:50:00 CST  Completed  Automated Diff: NOW collect, 22 9:58:00 CST, Blood, Collected, Stop date 22 9:58:00 CST, Lab Collect, Print Label By Order Location, 22 9:50:00 CST  BNP: STAT collect, 22 9:58:50 CST, BLOOD, Collected, Stop date 22 9:58:00 CST, Lab Collect  CBC w/ Auto Diff: NOW collect, 22 9:58:50 CST, BLOOD, Collected, Stop date 22 9:58:00 CST, Lab Collect  CMP: STAT collect, 22 9:58:50 CST, BLOOD, Collected, Stop date 22 9:58:00 CST, Lab Collect  EK22 9:50:00 CST, Stat, Once, 22 9:50:00 CST, Wheelchair, Patient Has IV, Standard Precautions, -1, -1, 22 9:50:00 CST  Estimated Glomerular Filtration Rate: STAT collect, 22 9:58:00 CST, Blood, Collected, Stop date 22 9:58:00 CST, Lab Collect, Print Label By Order Location, 22 9:50:00 CST  Magnesium Level: STAT collect, 22 9:58:50 CST, BLOOD, Collected, Stop date 22 9:58:00 CST, Lab Collect  PT (Protime): STAT collect, 22 9:58:50 CST, BLOOD, Collected, Stop date 22 9:58:00 CST, Lab Collect  PTT: STAT collect, 22 9:58:50 CST, BLOOD, Collected, Stop date 22 9:58:00 CST, Lab  Collect  Troponin-I: STAT collect, 01/28/22 9:58:50 CST, BLOOD, Collected, Stop date 01/28/22 9:58:00 CST, Lab Collect  XR Chest 2 Views: Stat, 01/28/22 9:50:00 CST, Chest Pain, None, Wheelchair, Patient Has IV?, Rad Type, Not Scheduled, 01/28/22 9:50:00 CST  aspirin: 324 mg, form: Tab-Chew, Oral, Once, first dose 01/28/22 9:50:00 CST, stop date 01/28/22 9:50:00 CST, STAT, 4 chew tab = 5 grain ASA.    Electrocardiogram:  Time 1/28/2022 09:37:00, rate 52, No ST-T changes, no ectopy, normal HI & QRS intervals, EP Interp, The Rhythm is sinus bradycardia.  .    Results review:  Lab results : Lab View   1/28/2022 10:40 CST      POC Troponin              0.00 ng/mL    1/28/2022 9:58 CST       Sodium Lvl                136 mmol/L                             Potassium Lvl             4.8 mmol/L                             Chloride                  103 mmol/L                             CO2                       24 mmol/L                             Calcium Lvl               9.7 mg/dL                             Magnesium Lvl             2.10 mg/dL                             Glucose Lvl               95 mg/dL                             BUN                       13.1 mg/dL                             Creatinine                0.82 mg/dL                             eGFR-AA                   >60  NA                             eGFR-DEMARCUS                  >60 mL/min/1.73 m2  NA                             Bili Total                0.6 mg/dL                             Bili Direct               0.3 mg/dL                             Bili Indirect             0.30 mg/dL                             AST                       48 unit/L  HI                             ALT                       43 unit/L                             Alk Phos                  91 unit/L                             Total Protein             6.5 gm/dL                             Albumin Lvl               3.7 gm/dL                             Globulin                   2.8 gm/dL                             A/G Ratio                 1.3 ratio                             BNP                       221.8 pg/mL  HI                             Troponin-I                <0.010 ng/mL                             WBC                       6.1 x10(3)/mcL                             RBC                       4.25 x10(6)/mcL                             Hgb                       10.4 gm/dL  LOW                             Hct                       35.5 %  LOW                             Platelet                  217 x10(3)/mcL                             MCV                       83.5 fL                             MCH                       24.5 pg  LOW                             MCHC                      29.3 gm/dL  LOW                             RDW                       25.5 %  HI                             MPV                       10.8 fL                             Abs Neut                  3.78 x10(3)/mcL                             Neutro Auto               62 %  NA                             Lymph Auto                22 %  NA                             Mono Auto                 10 %  NA                             Eos Auto                  4 %  NA                             Abs Eos                   0.2 x10(3)/mcL                             Basophil Auto             1 %  NA                             Abs Neutro                3.78 x10(3)/mcL                             Abs Lymph                 1.4 x10(3)/mcL                             Abs Mono                  0.6 x10(3)/mcL                             Abs Baso                  0.0 x10(3)/mcL                             PT                        12.8 second(s)                             INR                       1.0                             PTT                       25.4 second(s)  , Interpretation Abnormal results  mildly elevated BNP, troponin negative.    Chest X-Ray:  No acute disease process, interpretation  by Emergency Physician.    Radiology results:  Rad Results (ST)  < 12 hrs   Accession: [id number]  Order: XR Chest 2 Views  Report Dt/Tm: 01/28/2022 10:14  Report:      CLINICAL:  Chest pain.     COMPARISON: February 4, 2021.     FINDINGS:  Cardiopericardial silhouette is within normal limits.  Sternotomy wires. No acute dense focal or segmental consolidation,  congestion, pleural effusion or pneumothorax.       IMPRESSION:     No acute cardiopulmonary process identified.         .      Reexamination/ Reevaluation   Vital signs   Basic Oxygen Information   1/28/2022 9:40 CST       SpO2                      100 %                             Oxygen Therapy            Room air        Impression and Plan   Diagnosis   Chest pain (YMU50-MX R07.9)   CAD (coronary artery disease) (EZC52-LV I25.10)      Calls-Consults   -  1/28/2022 11:28:00 , [facility] - paged.    -  1/28/2022 11:47:00 , [facility] - will come evaluate patient.    -  1/28/2022 11:49:00 , [facility] - paged.    -  1/28/2022 11:52:00 , [facility] - admitted to [facility] .    Plan   Disposition: Admit time  1/28/2022 11:52:00, Admit to Inpatient Telemetry Unit, Nab BOYD, Bret THOMAS    Counseled: Patient, Family, Regarding diagnosis, Regarding diagnostic results, Regarding treatment plan, Patient indicated understanding of instructions, Family verbalizes understanding .    Notes: Cierra BAILEY, acted solely as a scribe for and in the presence of Dr. Ramos who performed the service., Lino BAILEY MD, performed the the history, physical exam, MDM and procedures as above and agree with the scribes documentation..

## 2022-05-14 NOTE — H&P
"   Patient:   Tiny Silverman            MRN: [id number]            FIN: [id number]               Age:   61 years     Sex:  Female     :  1960   Associated Diagnoses:   None   Author:   Adenike Watt      Basic Information   Source of history:  Self, Medical record.    Present at bedside:  Family member.    Referral source:  Emergency department.    History limitation:  None.       History of Present Illness   Ms. Silverman is a 60 y/o WF, followed by Dr. Olivo, with PMHx significant for native CAD/PCI, hypertension, dyslipidemia, CROW,  PAD-LLE, B ELYSSA, and anxiety who presented to ED due to c/o chest pain. Patient reports she had another one of her "episodes". Reports she was at work, sitting at her desk when developed an upset stomach with sharp pain to left lower chest wall "were my first stent was placed". She took Mylanta without relief and after 15 minutes she took a NTG with improvement in pain to just a dull ache. Admit vital signs/66, P 52, RR 19, 100% on room air.  Labs significant for , Troponin negative at 0.010.  EKG nonischemic -sinus bradycardia 50's. Patient is scheduled for [facility] on Wednesday for lateral wall ischemia and peripheral angiogram to assess B SFA's for leg pain. She has been admitted to evaluate chest pain.       PMH: CAD/PCI, hypertension, dyslipidemia, CROW, PAD left lower extremity, anxiety  PSH: Left external iliac artery stenting, angiogram with stenting, open heart surgery, but bypass not performed because of small vessels/disease in hospital for bypass s/ptransmyocardial revascularization,, cholecystectomy,  appendectomy, hysterectomy, tonsillectomy  Family History: Fatherhypertension, CADreason for death.  Motherhypertension, cardiac arrhythmia reason for death.  Brother CADCABG.  Hypertension.  CAD.  Sister CVA reason for death.  Hypertension  Social History: Former smoker quit     Previous Diagnostics:   [facility] 2021:  --- Left main " normal.  LAD patent.  Left circumflex 80% ISR. 90% stenosis left posterior lateral branch. RCA occluded with a patent .  Patient with successful laser atherectomy/PTA/stent of circumflex and PTA of left posterior lateral branch  PET 02/19/2021:  --- Abnormal perfusion study.  Studies consistent with ischemia.  Small reversible perfusion abnormality of moderate intensity inferior lateral region.  Perfusion imaging is suggestive of single-vessel disease.  Perfusion defect is in the distribution of the left circumflex artery.  LV cavity is noted to be normal on stress studies.  Stress LVEF 67%.  LV global function is normal.  The rest LV cavity is noted to be the best LVEF 55% and rest LV global function is normal.  [facility] 8/31/2018:  ---Left main large with no significant disease.  LAD with patent proximal and mid stents..  Left circumflex large dominant extensively stented in the past with stents in the proximal distal segments which remain widely patent with excellent flow.  There is 50% stenosis at the ostium of the left circumflex inside the stent across the ostium however there is no evidence of any obstructive disease.  RCA small, nondominant nonobstructive disease  Bilateral lower extremity angiogram, abdominal aortogram with runoff 6/7/2021:  ---Bilateral SFA patent.  Lateral infrapopliteal vessels patent.  Left external iliac artery 70% stenosis with 40 mm gradient.  Patient underwent successful PTA/stenting of left external iliac artery using left radial artery approach         Review of Systems   Constitutional:  No chills, No sweats, No weakness, No fatigue.    Respiratory:  No shortness of breath, No cough, No wheezing.    Cardiovascular:  No peripheral edema     Chest pain: Left sided, Anterior.    Gastrointestinal:  No nausea, No vomiting, No abdominal pain.    Genitourinary:  No dysuria.    Musculoskeletal:  No back pain.    Neurologic:  Alert and oriented X4.    Psychiatric:  Anxiety.       Health Status    Allergies:    Allergies (1) Active Reaction  Penicillin was uanable to arouse     Current medications:    Medications (1) Active  Scheduled: (0)  Continuous: (0)  PRN: (1)  Nitroglycerin 0.4 mg TAB  0.4 mg 1 tab(s), SL, q5min        Physical Examination   General:  Alert and oriented.    Cardiovascular:  Regular rhythm, bradycardia.       Vital Signs (last 24 hrs)_____  Last Charted___________  Heart Rate Peripheral   L 52bpm  (JAN 28 09:40)  Resp Rate         19 br/min  (JAN 28 09:40)  SBP      H 141mmHg  (JAN 28 09:40)  DBP      66 mmHg  (JAN 28 09:40)  SpO2      100 %  (JAN 28 09:40)        Review / Management   Results review:     Labs (Last four charted values)  WBC                  6.1 (JAN 28)   Hgb                  L 10.4 (JAN 28)   Hct                  L 35.5 (JAN 28)   Plt                  217 (JAN 28)   Na                   136 (JAN 28)   K                    4.8 (JAN 28)   CO2                  24 (JAN 28)   Cl                   103 (JAN 28)   Cr                   0.82 (JAN 28)   BUN                  13.1 (JAN 28)   Glucose Random       95 (JAN 28)   PT                   12.8 (JAN 28)   INR                  1.0 (JAN 28)   PTT                  25.4 (JAN 28) .    Radiology results   Rad Results (ST)   Accession: [id number]  Order: XR Chest 2 Views  Report Dt/Tm: 01/28/2022 10:14  Report:      CLINICAL:  Chest pain.     COMPARISON: February 4, 2021.     FINDINGS:  Cardiopericardial silhouette is within normal limits.  Sternotomy wires. No acute dense focal or segmental consolidation,  congestion, pleural effusion or pneumothorax.       IMPRESSION:     No acute cardiopulmonary process identified.            EKG not scanned into computer      Impression and Plan   Chest pain  Native CAD  Abnormal stress test  HTN  HLD  Anxiety     Plan:  Chest Pain/abnormal stress test/Native CAD/HLD  ---Discussed with patient. Troponin negative x 2. EKG nonischmeic. will DC home to proceed with [facility] per Dr. Olivo on  Wednesday as planned. Resume Brilinta, aspirin, Zetia, Metoprolol tartrate, Ranexa, and atorvastatin.   HTN  ---Remains above goal- Resume home medications- metoprolol tartrate. Patient reports BP is normally on low side 90's at home. Will have patient keep BP log for review upon f/u      DC home. Cardiac diet. Activity as tolerated. Report to ED for recurrence of CP

## 2022-05-23 DIAGNOSIS — I63.9 CEREBROVASCULAR ACCIDENT (CVA), UNSPECIFIED MECHANISM: Primary | ICD-10-CM

## 2022-06-21 ENCOUNTER — PATIENT MESSAGE (OUTPATIENT)
Dept: INTERNAL MEDICINE | Facility: CLINIC | Age: 62
End: 2022-06-21
Payer: COMMERCIAL

## 2022-06-21 NOTE — PROGRESS NOTES
The patient location is: home  The chief complaint leading to consultation is: sinus    Visit type: audiovisual    Face to Face time with patient: 8  12 minutes of total time spent on the encounter, which includes face to face time and non-face to face time preparing to see the patient (eg, review of tests), Obtaining and/or reviewing separately obtained history, Documenting clinical information in the electronic or other health record, Independently interpreting results (not separately reported) and communicating results to the patient/family/caregiver, or Care coordination (not separately reported).         Each patient to whom he or she provides medical services by telemedicine is:  (1) informed of the relationship between the physician and patient and the respective role of any other health care provider with respect to management of the patient; and (2) notified that he or she may decline to receive medical services by telemedicine and may withdraw from such care at any time.       Patient ID: [id number]     Chief Complaint: Sinus Problem        HPI:     Tiny Silverman is a 61 y.o. female addressed via telemedicine for     C/o dry cough x weeks with PND, for the past few days now having frontal headache, facial pressure     Taking Claritan, jacqueline seltzer, tylenol without relief of symptoms  No fever or wheezing   Does have seasonal allergies  No sick contacts           History of anxiety, severe coronary artery disease status post previous stent placements x7 , peripheral arterial disease, hypertension, hyperlipidemia,  elective CABG per Dr. Flores. Reported attempted CAB ×1 with left lower extremity endoscopic venous harvesting but unsuccessful; underwent transmyocardial laser revascularization.    History of subdural hematoma, monitored by trauma service as well as neurosurgery.  Dr. Olivo-cardiology; carotid disease being monitored by vascular services  Colonoscopy in October 2018 by Dr. Byrnes, repeat in 5  years.    Past Medical History:  Acid reflux  Anxiety and depression  CAD (coronary artery disease)  Claustrophobia  Essential (primary) hypertension  Kidney stones  Mixed hyperlipidemia  Obesity, unspecified  CROW (obstructive sleep apnea)  PAD (peripheral artery disease)  Pancreatitis  SOB (shortness of breath)     Past Surgical History:   Procedure Laterality Date    APPENDECTOMY N/A     Application of short arm splint      CORONARY STENT PLACEMENT N/A     CYst of breast  N/A     Dilation of Coronary Artery       ECTOPIC PREGNANCY SURGERY N/A     Excision of Left Greater Saphenous  N/A     Fluroscopy of Left Heart Using Low Osmolar Contrast  N/A     Immobilzation of Right Lower Arm Using Splint      LAPAROSCOPIC CHOLECYSTECTOMY N/A     LAPAROSCOPIC TOTAL HYSTERECTOMY N/A     Laroscopic  N/A     Percutaneous Transluminal N/A     Resection of Gallbladder      TONSILLECTOMY N/A        Review of patient's allergies indicates:   Allergen Reactions    Penicillins        Outpatient Medications Marked as Taking for the 6/22/22 encounter (Office Visit) with JAZLYN Vee   Medication Sig Dispense Refill    aspirin (ECOTRIN) 81 MG EC tablet aspirin 81 mg tablet,delayed release   Take 1 tablet every day by oral route.      atorvastatin (LIPITOR) 40 MG tablet Take 40 mg by mouth once daily.      EScitalopram oxalate (LEXAPRO) 20 MG tablet escitalopram 20 mg tablet   TAKE 1 TABLET BY MOUTH EVERY DAY      ezetimibe (ZETIA) 10 mg tablet ezetimibe 10 mg tablet      lysine (L-LYSINE) 500 mg Tab lysine 500 mg tablet   Take 1 tablet every day by oral route.      metoprolol tartrate (LOPRESSOR) 25 MG tablet Take 25 mg by mouth 2 (two) times daily.      nitroGLYCERIN (NITROSTAT) 0.4 MG SL tablet nitroglycerin 0.4 mg sublingual tablet   TAKE 1 TABLET UNDER THE TONGUE ONCE A DAY AS NEEDED FOR CHEST PAIN      ranolazine (RANEXA) 1,000 mg Tb12 ranolazine ER 1,000 mg tablet,extended release,12 hr   TAKE 1  TABLET BY MOUTH TWICE A DAY      ticagrelor (BRILINTA) 90 mg tablet Brilinta 90 mg tablet         Social History     Socioeconomic History    Marital status: Single   Tobacco Use    Smoking status: Former Smoker    Smokeless tobacco: Never Used   Substance and Sexual Activity    Alcohol use: Never    Drug use: Never        Family History   Problem Relation Age of Onset    Cancer Mother     Hypertension Mother     Breast cancer Mother     Hypertension Father     Coronary artery disease Father     Stroke Sister     Hypertension Sister     Hypertension Brother     Coronary artery disease Brother         Subjective:     ROS+facial pressure      See HPI for details    Constitutional: Denies Change in appetite. Denies Chills. Denies Fever. Denies Night sweats.  Eye: Denies Blurred vision. Denies Discharge. Denies Eye pain.  ENT: Denies Decreased hearing. Denies Sore throat. Denies Swollen glands.  Respiratory: + Cough. Denies Shortness of breath. Denies Shortness of breath with exertion. Denies Wheezing.  Cardiovascular: Denies Chest pain at rest. Denies Chest pain with exertion. Denies Irregular heartbeat. Denies Palpitations.  Musculoskeletal: Denies Painful joints. Denies Weakness.  Integumentary: Denies Rash. Denies Itching. Denies Dry skin.  Neurologic: Denies Dizziness. Denies Fainting. +Headache.  Psychiatric: Denies Depression. Denies Anxiety. Denies Suicidal/Homicidal ideations.    All Other ROS: Negative except as stated in HPI.       Objective:     There were no vitals taken for this visit.    Physical Exam    General: Alert and oriented, No acute distress.  Head: Normocephalic,  Psychiatric: Normal interaction, , Appropriate affect         Assessment:       ICD-10-CM ICD-9-CM   1. Acute non-recurrent maxillary sinusitis  J01.00 461.0   2. Cough  R05.9 786.2        Plan:     1. Acute non-recurrent maxillary sinusitis  - doxycycline (MONODOX) 100 MG capsule; Take 1 capsule (100 mg total) by mouth  every 12 (twelve) hours. for 7 days  Dispense: 14 capsule; Refill: 0    2. Cough  - benzonatate (TESSALON) 200 MG capsule; Take 1 capsule (200 mg total) by mouth 3 (three) times daily as needed for Cough.  Dispense: 30 capsule; Refill: 0       do not take otc meds with decongestants, use coricidin hbp prn for symptoms, take as directed in addition to above           Follow up if symptoms worsen or fail to improve.

## 2022-06-22 ENCOUNTER — OFFICE VISIT (OUTPATIENT)
Dept: INTERNAL MEDICINE | Facility: CLINIC | Age: 62
End: 2022-06-22
Payer: COMMERCIAL

## 2022-06-22 DIAGNOSIS — J01.00 ACUTE NON-RECURRENT MAXILLARY SINUSITIS: Primary | ICD-10-CM

## 2022-06-22 DIAGNOSIS — R05.9 COUGH: ICD-10-CM

## 2022-06-22 PROCEDURE — 1159F PR MEDICATION LIST DOCUMENTED IN MEDICAL RECORD: ICD-10-PCS | Mod: CPTII,95,, | Performed by: NURSE PRACTITIONER

## 2022-06-22 PROCEDURE — 1160F RVW MEDS BY RX/DR IN RCRD: CPT | Mod: CPTII,95,, | Performed by: NURSE PRACTITIONER

## 2022-06-22 PROCEDURE — 1160F PR REVIEW ALL MEDS BY PRESCRIBER/CLIN PHARMACIST DOCUMENTED: ICD-10-PCS | Mod: CPTII,95,, | Performed by: NURSE PRACTITIONER

## 2022-06-22 PROCEDURE — 1159F MED LIST DOCD IN RCRD: CPT | Mod: CPTII,95,, | Performed by: NURSE PRACTITIONER

## 2022-06-22 PROCEDURE — 99213 OFFICE O/P EST LOW 20 MIN: CPT | Mod: 95,,, | Performed by: NURSE PRACTITIONER

## 2022-06-22 PROCEDURE — 99213 PR OFFICE/OUTPT VISIT, EST, LEVL III, 20-29 MIN: ICD-10-PCS | Mod: 95,,, | Performed by: NURSE PRACTITIONER

## 2022-06-22 RX ORDER — DOXYCYCLINE 100 MG/1
100 CAPSULE ORAL EVERY 12 HOURS
Qty: 14 CAPSULE | Refills: 0 | Status: SHIPPED | OUTPATIENT
Start: 2022-06-22 | End: 2022-06-29

## 2022-06-22 RX ORDER — CYCLOBENZAPRINE HCL 10 MG
TABLET ORAL
COMMUNITY
Start: 2022-02-08 | End: 2023-02-08

## 2022-06-22 RX ORDER — ASPIRIN 81 MG/1
TABLET ORAL
COMMUNITY

## 2022-06-22 RX ORDER — RANOLAZINE 1000 MG/1
TABLET, EXTENDED RELEASE ORAL
COMMUNITY

## 2022-06-22 RX ORDER — METOPROLOL TARTRATE 25 MG/1
25 TABLET, FILM COATED ORAL 2 TIMES DAILY
Status: ON HOLD | COMMUNITY
Start: 2022-04-29 | End: 2022-10-06 | Stop reason: HOSPADM

## 2022-06-22 RX ORDER — NITROGLYCERIN 0.4 MG/1
TABLET SUBLINGUAL
COMMUNITY

## 2022-06-22 RX ORDER — DIAPER,BRIEF,ADULT, DISPOSABLE
EACH MISCELLANEOUS
Status: ON HOLD | COMMUNITY
End: 2022-08-24 | Stop reason: CLARIF

## 2022-06-22 RX ORDER — EZETIMIBE 10 MG/1
TABLET ORAL
COMMUNITY

## 2022-06-22 RX ORDER — FERROUS SULFATE 325(65) MG
TABLET ORAL DAILY
COMMUNITY
Start: 2022-02-02 | End: 2023-03-16

## 2022-06-22 RX ORDER — ONDANSETRON 4 MG/1
TABLET, FILM COATED ORAL
COMMUNITY
End: 2023-03-16

## 2022-06-22 RX ORDER — ESCITALOPRAM OXALATE 20 MG/1
TABLET ORAL
Status: ON HOLD | COMMUNITY
Start: 2021-10-20 | End: 2022-08-24 | Stop reason: CLARIF

## 2022-06-22 RX ORDER — BENZONATATE 200 MG/1
200 CAPSULE ORAL 3 TIMES DAILY PRN
Qty: 30 CAPSULE | Refills: 0 | Status: SHIPPED | OUTPATIENT
Start: 2022-06-22 | End: 2022-07-02

## 2022-06-22 RX ORDER — TAMSULOSIN HYDROCHLORIDE 0.4 MG/1
1 CAPSULE ORAL DAILY
Status: ON HOLD | COMMUNITY
Start: 2022-03-23 | End: 2022-08-24 | Stop reason: CLARIF

## 2022-06-22 RX ORDER — ATORVASTATIN CALCIUM 40 MG/1
40 TABLET, FILM COATED ORAL DAILY
COMMUNITY
Start: 2022-06-14

## 2022-06-22 RX ORDER — FLUTICASONE PROPIONATE 50 MCG
1 SPRAY, SUSPENSION (ML) NASAL 2 TIMES DAILY
Qty: 16 G | Refills: 3 | Status: SHIPPED | OUTPATIENT
Start: 2022-06-22

## 2022-06-23 ENCOUNTER — TELEPHONE (OUTPATIENT)
Dept: INTERNAL MEDICINE | Facility: CLINIC | Age: 62
End: 2022-06-23
Payer: COMMERCIAL

## 2022-06-24 ENCOUNTER — PATIENT MESSAGE (OUTPATIENT)
Dept: INTERNAL MEDICINE | Facility: CLINIC | Age: 62
End: 2022-06-24
Payer: COMMERCIAL

## 2022-06-24 ENCOUNTER — TELEPHONE (OUTPATIENT)
Dept: INTERNAL MEDICINE | Facility: CLINIC | Age: 62
End: 2022-06-24
Payer: COMMERCIAL

## 2022-06-24 NOTE — TELEPHONE ENCOUNTER
----- Message from Emily Gonzalez sent at 6/24/2022  8:40 AM CDT -----  Regarding: Advice  Type:  Needs Medical Advice    Who Called: Patient  Symptoms (please be specific): Did not have funky dream last night since she stopped the antibiotic but is still having the headache   How long has patient had these symptoms:    Pharmacy name and phone #:  [email or web address]  Would the patient rather a call back or a response via [facility]?   Best Call Back Number: [phone]  Additional Information:

## 2022-08-24 ENCOUNTER — HOSPITAL ENCOUNTER (OUTPATIENT)
Facility: HOSPITAL | Age: 62
Discharge: HOME | End: 2022-08-24
Attending: INTERNAL MEDICINE | Admitting: INTERNAL MEDICINE
Payer: COMMERCIAL

## 2022-08-24 VITALS
HEIGHT: 67 IN | HEART RATE: 54 BPM | SYSTOLIC BLOOD PRESSURE: 108 MMHG | TEMPERATURE: 98 F | BODY MASS INDEX: 30.56 KG/M2 | RESPIRATION RATE: 14 BRPM | DIASTOLIC BLOOD PRESSURE: 60 MMHG | WEIGHT: 194.69 LBS | OXYGEN SATURATION: 99 %

## 2022-08-24 DIAGNOSIS — I65.23 BILATERAL CAROTID ARTERY OCCLUSION: ICD-10-CM

## 2022-08-24 DIAGNOSIS — Z01.818 PREOP TESTING: ICD-10-CM

## 2022-08-24 PROCEDURE — 25000003 PHARM REV CODE 250: Performed by: INTERNAL MEDICINE

## 2022-08-24 PROCEDURE — 93005 ELECTROCARDIOGRAM TRACING: CPT

## 2022-08-24 PROCEDURE — C1894 INTRO/SHEATH, NON-LASER: HCPCS | Performed by: INTERNAL MEDICINE

## 2022-08-24 PROCEDURE — 99152 MOD SED SAME PHYS/QHP 5/>YRS: CPT | Performed by: INTERNAL MEDICINE

## 2022-08-24 PROCEDURE — 25500020 PHARM REV CODE 255: Performed by: INTERNAL MEDICINE

## 2022-08-24 PROCEDURE — 93010 ELECTROCARDIOGRAM REPORT: CPT | Mod: ,,, | Performed by: INTERNAL MEDICINE

## 2022-08-24 PROCEDURE — 36222 PLACE CATH CAROTID/INOM ART: CPT | Mod: 50 | Performed by: INTERNAL MEDICINE

## 2022-08-24 PROCEDURE — 93010 EKG 12-LEAD: ICD-10-PCS | Mod: ,,, | Performed by: INTERNAL MEDICINE

## 2022-08-24 PROCEDURE — C1769 GUIDE WIRE: HCPCS | Performed by: INTERNAL MEDICINE

## 2022-08-24 PROCEDURE — 63600175 PHARM REV CODE 636 W HCPCS: Performed by: INTERNAL MEDICINE

## 2022-08-24 PROCEDURE — C1887 CATHETER, GUIDING: HCPCS | Performed by: INTERNAL MEDICINE

## 2022-08-24 RX ORDER — LIDOCAINE HYDROCHLORIDE 20 MG/ML
INJECTION, SOLUTION EPIDURAL; INFILTRATION; INTRACAUDAL; PERINEURAL
Status: DISCONTINUED | OUTPATIENT
Start: 2022-08-24 | End: 2022-08-24 | Stop reason: HOSPADM

## 2022-08-24 RX ORDER — ACETAMINOPHEN 325 MG/1
650 TABLET ORAL EVERY 4 HOURS PRN
Status: DISCONTINUED | OUTPATIENT
Start: 2022-08-24 | End: 2022-08-24 | Stop reason: HOSPADM

## 2022-08-24 RX ORDER — FENTANYL CITRATE 50 UG/ML
INJECTION, SOLUTION INTRAMUSCULAR; INTRAVENOUS
Status: DISCONTINUED | OUTPATIENT
Start: 2022-08-24 | End: 2022-08-24 | Stop reason: HOSPADM

## 2022-08-24 RX ORDER — SODIUM CHLORIDE 9 MG/ML
INJECTION, SOLUTION INTRAVENOUS ONCE
Status: COMPLETED | OUTPATIENT
Start: 2022-08-24 | End: 2022-08-24

## 2022-08-24 RX ORDER — ONDANSETRON 4 MG/1
8 TABLET, ORALLY DISINTEGRATING ORAL EVERY 8 HOURS PRN
Status: DISCONTINUED | OUTPATIENT
Start: 2022-08-24 | End: 2022-08-24 | Stop reason: HOSPADM

## 2022-08-24 RX ORDER — MIDAZOLAM HYDROCHLORIDE 1 MG/ML
INJECTION, SOLUTION INTRAMUSCULAR; INTRAVENOUS
Status: DISCONTINUED | OUTPATIENT
Start: 2022-08-24 | End: 2022-08-24 | Stop reason: HOSPADM

## 2022-08-24 RX ORDER — BUSPIRONE HYDROCHLORIDE 5 MG/1
5 TABLET ORAL 2 TIMES DAILY
COMMUNITY
End: 2022-10-13

## 2022-08-24 RX ORDER — FUROSEMIDE 40 MG/1
40 TABLET ORAL DAILY
Status: ON HOLD | COMMUNITY
End: 2022-10-06 | Stop reason: SDUPTHER

## 2022-08-24 RX ADMIN — SODIUM CHLORIDE: 9 INJECTION, SOLUTION INTRAVENOUS at 09:08

## 2022-08-24 NOTE — Clinical Note
The groin and left radial was prepped. The site was prepped with ChloraPrep. The patient was draped. The patient was positioned supine.

## 2022-08-24 NOTE — DISCHARGE SUMMARY
[facility] - Cath Lab Services  Discharge Note  Short Stay    Procedure(s) (LRB):  ANGIOGRAM, CAROTID (N/A)    OUTCOME: Patient tolerated treatment/procedure well without complication and is now ready for discharge.    DISPOSITION: Home or Self Care    FINAL DIAGNOSIS:  <principal problem not specified>    FOLLOWUP: In clinic    DISCHARGE INSTRUCTIONS:  No discharge procedures on file.     TIME SPENT ON DISCHARGE: 30 minutes

## 2022-08-26 ENCOUNTER — PATIENT MESSAGE (OUTPATIENT)
Dept: ADMINISTRATIVE | Facility: OTHER | Age: 62
End: 2022-08-26
Payer: COMMERCIAL

## 2022-09-14 NOTE — H&P
Patient name: Tiny Silverman  MRN: [id number]  : 1960  Cath Lab Procedure H&P Update    Pre-Procedure Assessment:    I saw and examined the patient face to face. The patient has been re-evaluated and her condition is unchanged. The reason for admission, procedure and care is still present.  Based on the patients H&P, pre-procedure physical exam, relevant diagnostic studies, NPO status and information obtained from the patient, I determined the patient is an appropriate candidate for the proposed procedure and anesthesia planned. I further certify the anesthesia risks, benefits and options have been explained to the patient to which she agrees as documented on the procedural consent.

## 2022-09-29 ENCOUNTER — OCCUPATIONAL HEALTH (OUTPATIENT)
Dept: URGENT CARE | Facility: CLINIC | Age: 62
End: 2022-09-29
Payer: COMMERCIAL

## 2022-09-29 DIAGNOSIS — R05.9 COUGH: ICD-10-CM

## 2022-09-29 DIAGNOSIS — R05.9 COUGH: Primary | ICD-10-CM

## 2022-09-29 LAB
CTP QC/QA: YES
SARS-COV-2 RDRP RESP QL NAA+PROBE: NEGATIVE

## 2022-09-29 PROCEDURE — 99211 OFF/OP EST MAY X REQ PHY/QHP: CPT | Mod: PBBFAC

## 2022-09-29 PROCEDURE — U0002 COVID-19 LAB TEST NON-CDC: HCPCS | Mod: PBBFAC

## 2022-10-04 ENCOUNTER — ANESTHESIA EVENT (OUTPATIENT)
Dept: CARDIOLOGY | Facility: HOSPITAL | Age: 62
DRG: 036 | End: 2022-10-04
Payer: COMMERCIAL

## 2022-10-04 RX ORDER — LIDOCAINE HYDROCHLORIDE 10 MG/ML
1 INJECTION, SOLUTION EPIDURAL; INFILTRATION; INTRACAUDAL; PERINEURAL ONCE
Status: CANCELLED | OUTPATIENT
Start: 2022-10-04 | End: 2022-10-04

## 2022-10-05 ENCOUNTER — ANESTHESIA (OUTPATIENT)
Dept: CARDIOLOGY | Facility: HOSPITAL | Age: 62
DRG: 036 | End: 2022-10-05
Payer: COMMERCIAL

## 2022-10-05 ENCOUNTER — HOSPITAL ENCOUNTER (INPATIENT)
Facility: HOSPITAL | Age: 62
LOS: 1 days | Discharge: HOME | DRG: 036 | End: 2022-10-06
Attending: INTERNAL MEDICINE | Admitting: INTERNAL MEDICINE
Payer: COMMERCIAL

## 2022-10-05 DIAGNOSIS — Z01.818 PREOP TESTING: ICD-10-CM

## 2022-10-05 DIAGNOSIS — I65.23 BILATERAL CAROTID ARTERY OCCLUSION: ICD-10-CM

## 2022-10-05 PROBLEM — I65.21 SYMPTOMATIC CAROTID ARTERY STENOSIS WITHOUT INFARCTION, RIGHT: Status: ACTIVE | Noted: 2022-10-05

## 2022-10-05 LAB
POC ACTIVATED CLOTTING TIME K: 132 SEC (ref 74–137)
SAMPLE: NORMAL

## 2022-10-05 PROCEDURE — 37215 TRANSCATH STENT CCA W/EPS: CPT | Mod: RT | Performed by: INTERNAL MEDICINE

## 2022-10-05 PROCEDURE — C1876 STENT, NON-COA/NON-COV W/DEL: HCPCS | Performed by: INTERNAL MEDICINE

## 2022-10-05 PROCEDURE — 27201423 OPTIME MED/SURG SUP & DEVICES STERILE SUPPLY: Performed by: INTERNAL MEDICINE

## 2022-10-05 PROCEDURE — 93010 ELECTROCARDIOGRAM REPORT: CPT | Mod: ,,, | Performed by: INTERNAL MEDICINE

## 2022-10-05 PROCEDURE — 63600175 PHARM REV CODE 636 W HCPCS: Performed by: STUDENT IN AN ORGANIZED HEALTH CARE EDUCATION/TRAINING PROGRAM

## 2022-10-05 PROCEDURE — 94761 N-INVAS EAR/PLS OXIMETRY MLT: CPT

## 2022-10-05 PROCEDURE — 25500020 PHARM REV CODE 255: Performed by: INTERNAL MEDICINE

## 2022-10-05 PROCEDURE — C1894 INTRO/SHEATH, NON-LASER: HCPCS | Performed by: INTERNAL MEDICINE

## 2022-10-05 PROCEDURE — 25000003 PHARM REV CODE 250: Performed by: STUDENT IN AN ORGANIZED HEALTH CARE EDUCATION/TRAINING PROGRAM

## 2022-10-05 PROCEDURE — 21400001 HC TELEMETRY ROOM

## 2022-10-05 PROCEDURE — 25000003 PHARM REV CODE 250: Performed by: INTERNAL MEDICINE

## 2022-10-05 PROCEDURE — P9047 ALBUMIN (HUMAN), 25%, 50ML: HCPCS | Mod: JG

## 2022-10-05 PROCEDURE — 63600175 PHARM REV CODE 636 W HCPCS

## 2022-10-05 PROCEDURE — 93005 ELECTROCARDIOGRAM TRACING: CPT

## 2022-10-05 PROCEDURE — 99499 UNLISTED E&M SERVICE: CPT | Mod: ,,, | Performed by: THORACIC SURGERY (CARDIOTHORACIC VASCULAR SURGERY)

## 2022-10-05 PROCEDURE — 25000003 PHARM REV CODE 250: Performed by: NURSE PRACTITIONER

## 2022-10-05 PROCEDURE — 99499 NO LOS: ICD-10-PCS | Mod: ,,, | Performed by: THORACIC SURGERY (CARDIOTHORACIC VASCULAR SURGERY)

## 2022-10-05 PROCEDURE — 99223 1ST HOSP IP/OBS HIGH 75: CPT | Mod: ,,, | Performed by: THORACIC SURGERY (CARDIOTHORACIC VASCULAR SURGERY)

## 2022-10-05 PROCEDURE — 11000001 HC ACUTE MED/SURG PRIVATE ROOM

## 2022-10-05 PROCEDURE — 93010 EKG 12-LEAD: ICD-10-PCS | Mod: ,,, | Performed by: INTERNAL MEDICINE

## 2022-10-05 PROCEDURE — 63600175 PHARM REV CODE 636 W HCPCS: Performed by: ANESTHESIOLOGY

## 2022-10-05 PROCEDURE — C1725 CATH, TRANSLUMIN NON-LASER: HCPCS | Performed by: INTERNAL MEDICINE

## 2022-10-05 PROCEDURE — 37000009 HC ANESTHESIA EA ADD 15 MINS: Performed by: INTERNAL MEDICINE

## 2022-10-05 PROCEDURE — C1769 GUIDE WIRE: HCPCS | Performed by: INTERNAL MEDICINE

## 2022-10-05 PROCEDURE — 37000008 HC ANESTHESIA 1ST 15 MINUTES: Performed by: INTERNAL MEDICINE

## 2022-10-05 PROCEDURE — 99223 PR INITIAL HOSPITAL CARE,LEVL III: ICD-10-PCS | Mod: ,,, | Performed by: THORACIC SURGERY (CARDIOTHORACIC VASCULAR SURGERY)

## 2022-10-05 PROCEDURE — 63600175 PHARM REV CODE 636 W HCPCS: Mod: JG

## 2022-10-05 PROCEDURE — 25000003 PHARM REV CODE 250

## 2022-10-05 RX ORDER — ALBUMIN HUMAN 250 G/1000ML
SOLUTION INTRAVENOUS
Status: COMPLETED
Start: 2022-10-05 | End: 2022-10-05

## 2022-10-05 RX ORDER — PHENYLEPHRINE HCL IN 0.9% NACL 1 MG/10 ML
SYRINGE (ML) INTRAVENOUS
Status: DISCONTINUED | OUTPATIENT
Start: 2022-10-05 | End: 2022-10-05

## 2022-10-05 RX ORDER — CEFAZOLIN SODIUM 1 G/3ML
INJECTION, POWDER, FOR SOLUTION INTRAMUSCULAR; INTRAVENOUS
Status: DISCONTINUED | OUTPATIENT
Start: 2022-10-05 | End: 2022-10-05

## 2022-10-05 RX ORDER — ROCURONIUM BROMIDE 10 MG/ML
INJECTION, SOLUTION INTRAVENOUS
Status: DISCONTINUED | OUTPATIENT
Start: 2022-10-05 | End: 2022-10-05

## 2022-10-05 RX ORDER — FUROSEMIDE 40 MG/1
40 TABLET ORAL DAILY
Status: DISCONTINUED | OUTPATIENT
Start: 2022-10-05 | End: 2022-10-06 | Stop reason: HOSPADM

## 2022-10-05 RX ORDER — EPHEDRINE SULFATE 50 MG/ML
INJECTION, SOLUTION INTRAVENOUS
Status: DISCONTINUED | OUTPATIENT
Start: 2022-10-05 | End: 2022-10-05

## 2022-10-05 RX ORDER — IPRATROPIUM BROMIDE AND ALBUTEROL SULFATE 2.5; .5 MG/3ML; MG/3ML
SOLUTION RESPIRATORY (INHALATION)
Status: DISCONTINUED
Start: 2022-10-05 | End: 2022-10-05 | Stop reason: WASHOUT

## 2022-10-05 RX ORDER — RANOLAZINE 500 MG/1
500 TABLET, EXTENDED RELEASE ORAL 2 TIMES DAILY
Status: DISCONTINUED | OUTPATIENT
Start: 2022-10-05 | End: 2022-10-06 | Stop reason: HOSPADM

## 2022-10-05 RX ORDER — BUSPIRONE HYDROCHLORIDE 5 MG/1
5 TABLET ORAL 2 TIMES DAILY
Status: DISCONTINUED | OUTPATIENT
Start: 2022-10-05 | End: 2022-10-06 | Stop reason: HOSPADM

## 2022-10-05 RX ORDER — METOPROLOL TARTRATE 1 MG/ML
INJECTION, SOLUTION INTRAVENOUS
Status: COMPLETED
Start: 2022-10-05 | End: 2022-10-05

## 2022-10-05 RX ORDER — MIDAZOLAM HYDROCHLORIDE 1 MG/ML
INJECTION INTRAMUSCULAR; INTRAVENOUS
Status: COMPLETED
Start: 2022-10-05 | End: 2022-10-05

## 2022-10-05 RX ORDER — ACETAMINOPHEN 10 MG/ML
1000 INJECTION, SOLUTION INTRAVENOUS ONCE
Status: COMPLETED | OUTPATIENT
Start: 2022-10-05 | End: 2022-10-05

## 2022-10-05 RX ORDER — SODIUM CHLORIDE 9 MG/ML
0-999 INJECTION, SOLUTION INTRAVENOUS CONTINUOUS
Status: DISCONTINUED | OUTPATIENT
Start: 2022-10-05 | End: 2022-10-06 | Stop reason: HOSPADM

## 2022-10-05 RX ORDER — METOPROLOL TARTRATE 1 MG/ML
INJECTION, SOLUTION INTRAVENOUS
Status: DISCONTINUED
Start: 2022-10-05 | End: 2022-10-05 | Stop reason: WASHOUT

## 2022-10-05 RX ORDER — HYDROMORPHONE HYDROCHLORIDE 2 MG/ML
0.2 INJECTION, SOLUTION INTRAMUSCULAR; INTRAVENOUS; SUBCUTANEOUS EVERY 5 MIN PRN
Status: DISCONTINUED | OUTPATIENT
Start: 2022-10-05 | End: 2022-10-05

## 2022-10-05 RX ORDER — METOPROLOL TARTRATE 1 MG/ML
5 INJECTION, SOLUTION INTRAVENOUS EVERY 5 MIN PRN
Status: DISCONTINUED | OUTPATIENT
Start: 2022-10-05 | End: 2022-10-06 | Stop reason: HOSPADM

## 2022-10-05 RX ORDER — FLUTICASONE PROPIONATE 50 MCG
1 SPRAY, SUSPENSION (ML) NASAL 2 TIMES DAILY
Status: DISCONTINUED | OUTPATIENT
Start: 2022-10-05 | End: 2022-10-06 | Stop reason: HOSPADM

## 2022-10-05 RX ORDER — SODIUM CHLORIDE 9 MG/ML
INJECTION, SOLUTION INTRAVENOUS CONTINUOUS
Status: DISCONTINUED | OUTPATIENT
Start: 2022-10-05 | End: 2022-10-05

## 2022-10-05 RX ORDER — ACETAMINOPHEN 10 MG/ML
INJECTION, SOLUTION INTRAVENOUS
Status: COMPLETED
Start: 2022-10-05 | End: 2022-10-05

## 2022-10-05 RX ORDER — HYDROCODONE BITARTRATE AND ACETAMINOPHEN 5; 325 MG/1; MG/1
1 TABLET ORAL EVERY 4 HOURS PRN
Status: DISCONTINUED | OUTPATIENT
Start: 2022-10-05 | End: 2022-10-06 | Stop reason: HOSPADM

## 2022-10-05 RX ORDER — ATORVASTATIN CALCIUM 40 MG/1
40 TABLET, FILM COATED ORAL DAILY
Status: DISCONTINUED | OUTPATIENT
Start: 2022-10-05 | End: 2022-10-06 | Stop reason: HOSPADM

## 2022-10-05 RX ORDER — LIDOCAINE HYDROCHLORIDE 20 MG/ML
INJECTION, SOLUTION EPIDURAL; INFILTRATION; INTRACAUDAL; PERINEURAL
Status: DISCONTINUED | OUTPATIENT
Start: 2022-10-05 | End: 2022-10-05

## 2022-10-05 RX ORDER — FENTANYL CITRATE 50 UG/ML
INJECTION, SOLUTION INTRAMUSCULAR; INTRAVENOUS
Status: DISCONTINUED | OUTPATIENT
Start: 2022-10-05 | End: 2022-10-05

## 2022-10-05 RX ORDER — PROPOFOL 10 MG/ML
VIAL (ML) INTRAVENOUS
Status: DISCONTINUED | OUTPATIENT
Start: 2022-10-05 | End: 2022-10-05

## 2022-10-05 RX ORDER — ASPIRIN 81 MG/1
81 TABLET ORAL DAILY
Status: DISCONTINUED | OUTPATIENT
Start: 2022-10-05 | End: 2022-10-06 | Stop reason: HOSPADM

## 2022-10-05 RX ORDER — HEPARIN SODIUM 1000 [USP'U]/ML
INJECTION, SOLUTION INTRAVENOUS; SUBCUTANEOUS
Status: DISCONTINUED | OUTPATIENT
Start: 2022-10-05 | End: 2022-10-05

## 2022-10-05 RX ORDER — EPHEDRINE SULFATE 50 MG/ML
INJECTION, SOLUTION INTRAVENOUS
Status: DISCONTINUED
Start: 2022-10-05 | End: 2022-10-05 | Stop reason: WASHOUT

## 2022-10-05 RX ORDER — ONDANSETRON 2 MG/ML
4 INJECTION INTRAMUSCULAR; INTRAVENOUS ONCE AS NEEDED
Status: DISCONTINUED | OUTPATIENT
Start: 2022-10-05 | End: 2022-10-05

## 2022-10-05 RX ORDER — ESMOLOL HYDROCHLORIDE 10 MG/ML
INJECTION INTRAVENOUS
Status: DISCONTINUED | OUTPATIENT
Start: 2022-10-05 | End: 2022-10-05

## 2022-10-05 RX ORDER — ALBUMIN HUMAN 250 G/1000ML
12.5 SOLUTION INTRAVENOUS ONCE
Status: COMPLETED | OUTPATIENT
Start: 2022-10-05 | End: 2022-10-05

## 2022-10-05 RX ORDER — METOPROLOL TARTRATE 25 MG/1
25 TABLET, FILM COATED ORAL 2 TIMES DAILY
Status: DISCONTINUED | OUTPATIENT
Start: 2022-10-05 | End: 2022-10-06 | Stop reason: HOSPADM

## 2022-10-05 RX ORDER — PROTAMINE SULFATE 10 MG/ML
INJECTION, SOLUTION INTRAVENOUS
Status: DISCONTINUED | OUTPATIENT
Start: 2022-10-05 | End: 2022-10-05

## 2022-10-05 RX ORDER — MIDAZOLAM HYDROCHLORIDE 1 MG/ML
INJECTION INTRAMUSCULAR; INTRAVENOUS
Status: DISCONTINUED | OUTPATIENT
Start: 2022-10-05 | End: 2022-10-05

## 2022-10-05 RX ADMIN — ESMOLOL HYDROCHLORIDE 20 MG: 100 INJECTION, SOLUTION INTRAVENOUS at 10:10

## 2022-10-05 RX ADMIN — MIDAZOLAM HYDROCHLORIDE 1 MG: 1 INJECTION, SOLUTION INTRAMUSCULAR; INTRAVENOUS at 08:10

## 2022-10-05 RX ADMIN — BUSPIRONE HYDROCHLORIDE 5 MG: 5 TABLET ORAL at 08:10

## 2022-10-05 RX ADMIN — HYDROMORPHONE HYDROCHLORIDE 0.2 MG: 2 INJECTION, SOLUTION INTRAMUSCULAR; INTRAVENOUS; SUBCUTANEOUS at 11:10

## 2022-10-05 RX ADMIN — ACETAMINOPHEN 1000 MG: 10 INJECTION, SOLUTION INTRAVENOUS at 11:10

## 2022-10-05 RX ADMIN — ROCURONIUM BROMIDE 20 MG: 10 SOLUTION INTRAVENOUS at 10:10

## 2022-10-05 RX ADMIN — ALBUMIN (HUMAN) 12.5 G: 12.5 SOLUTION INTRAVENOUS at 03:10

## 2022-10-05 RX ADMIN — SUGAMMADEX 200 MG: 100 INJECTION, SOLUTION INTRAVENOUS at 10:10

## 2022-10-05 RX ADMIN — ALBUMIN HUMAN 12.5 G: 250 SOLUTION INTRAVENOUS at 02:10

## 2022-10-05 RX ADMIN — PROPOFOL 150 MG: 10 INJECTION, EMULSION INTRAVENOUS at 09:10

## 2022-10-05 RX ADMIN — ROCURONIUM BROMIDE 50 MG: 10 SOLUTION INTRAVENOUS at 09:10

## 2022-10-05 RX ADMIN — Medication 100 MCG: at 09:10

## 2022-10-05 RX ADMIN — EPHEDRINE SULFATE 10 MG: 50 INJECTION INTRAVENOUS at 09:10

## 2022-10-05 RX ADMIN — ALBUMIN (HUMAN) 12.5 G: 12.5 SOLUTION INTRAVENOUS at 02:10

## 2022-10-05 RX ADMIN — ALBUMIN HUMAN 12.5 G: 250 SOLUTION INTRAVENOUS at 03:10

## 2022-10-05 RX ADMIN — EPHEDRINE SULFATE 5 MG: 50 INJECTION INTRAVENOUS at 09:10

## 2022-10-05 RX ADMIN — HYDROCODONE BITARTRATE AND ACETAMINOPHEN 1 TABLET: 5; 325 TABLET ORAL at 08:10

## 2022-10-05 RX ADMIN — RANOLAZINE 500 MG: 500 TABLET, EXTENDED RELEASE ORAL at 08:10

## 2022-10-05 RX ADMIN — EPHEDRINE SULFATE 10 MG: 50 INJECTION INTRAVENOUS at 10:10

## 2022-10-05 RX ADMIN — FENTANYL CITRATE 75 MCG: 50 INJECTION, SOLUTION INTRAMUSCULAR; INTRAVENOUS at 09:10

## 2022-10-05 RX ADMIN — PROTAMINE SULFATE 80 MG: 10 INJECTION, SOLUTION INTRAVENOUS at 10:10

## 2022-10-05 RX ADMIN — METOPROLOL TARTRATE 5 MG: 5 INJECTION, SOLUTION INTRAVENOUS at 11:10

## 2022-10-05 RX ADMIN — EPHEDRINE SULFATE 5 MG: 50 INJECTION INTRAVENOUS at 10:10

## 2022-10-05 RX ADMIN — PHENYLEPHRINE HYDROCHLORIDE 25 MCG/MIN: 10 INJECTION INTRAVENOUS at 09:10

## 2022-10-05 RX ADMIN — FENTANYL CITRATE 25 MCG: 50 INJECTION, SOLUTION INTRAMUSCULAR; INTRAVENOUS at 09:10

## 2022-10-05 RX ADMIN — ASPIRIN 81 MG: 81 TABLET, COATED ORAL at 11:10

## 2022-10-05 RX ADMIN — SODIUM CHLORIDE, SODIUM GLUCONATE, SODIUM ACETATE, POTASSIUM CHLORIDE AND MAGNESIUM CHLORIDE: 526; 502; 368; 37; 30 INJECTION, SOLUTION INTRAVENOUS at 09:10

## 2022-10-05 RX ADMIN — HYDROCODONE BITARTRATE AND ACETAMINOPHEN 1 TABLET: 5; 325 TABLET ORAL at 11:10

## 2022-10-05 RX ADMIN — LIDOCAINE HYDROCHLORIDE 80 MG: 20 INJECTION, SOLUTION EPIDURAL; INFILTRATION; INTRACAUDAL; PERINEURAL at 09:10

## 2022-10-05 RX ADMIN — HEPARIN SODIUM 8000 UNITS: 1000 INJECTION, SOLUTION INTRAVENOUS; SUBCUTANEOUS at 09:10

## 2022-10-05 RX ADMIN — CEFAZOLIN 1 G: 330 INJECTION, POWDER, FOR SOLUTION INTRAMUSCULAR; INTRAVENOUS at 09:10

## 2022-10-05 RX ADMIN — TICAGRELOR 90 MG: 90 TABLET ORAL at 08:10

## 2022-10-05 RX ADMIN — METOPROLOL TARTRATE 5 MG: 1 INJECTION, SOLUTION INTRAVENOUS at 11:10

## 2022-10-05 NOTE — OP NOTE
[facility] - Cath Lab Services  Cardiothoracic Surgery  Operative Note    SUMMARY     Date of Procedure: 10/5/2022     Procedure:  1.  Right carotid artery TCAR procedure    Co-Surgeon: LEISA Barros / Mp Olivo    Assistant: Ryan Ceron    Pre-Operative Diagnosis:  1.  Symptomatic right carotid artery stenosis  2. History of coronary artery disease status post PCI  3. Obesity  4.  Hypertension     Post-Operative Diagnosis:  Same    Anesthesia: General    Operative Findings (including complications, if any):  Dictated    Description of Technical Procedures: Patient was lived to the cath lab and prepped and draped in the usual sterile fashion.  A 3 cm incision was made in Evelia's line just cranial to the sternoclavicular junction.  Subcutaneous tissues were dissected with the Bovie electrocautery.  The common carotid artery was identified in the carotid sheath and encircled with vessel loops.  Care was taken to not disrupt the vagus nerve and its course incised the wound was observed and preserved.  Heparin was administered.  Once the ACT was satisfactory continuous cerebral oximetry was employed and did not differ by more than 20% throughout the entire case.  The [address] dynamic flow reversal system was then delivered to the field.  Access to the common carotid artery was performed with a micropuncture access kit.  Five 0 Prolene pursestring was placed in the anterior surface of the carotid artery.  Under fluoroscopic guidance a diagnostic common carotid and internal carotid artery angiogram was performed.  The wire was then passed into the external carotid artery and the [address] flow reversal she was then passed over a stiff wire.  Dynamic flow reversal was instituted.  The shockwave lithotripsy device was then placed over a wire into the internal carotid artery and lithotripsy was performed as per the medical record.   Carotid angiogram was again performed.  The [address] carotid stent  system was then delivered to the field and the stent was deployed in the usual fashion with dynamic flow reversal.  Completion angiogram revealed the stent to be well deployed without evidence of significant stenosis.    Patient was recovered from anesthesia and delivered to the recovery room in stable condition without any evidence neurological deficit.  There was minimal blood loss.     Estimated Blood Loss (EBL): N/A          Specimens:   Specimen (24h ago, onward)      None                    Condition: Stable    Disposition: PACU - hemodynamically stable.

## 2022-10-05 NOTE — Clinical Note
An angiography was performed of the ostial, proximal, mid and distal right internal carotid via power injection.

## 2022-10-05 NOTE — Clinical Note
The groin, chest and neck was prepped. The site was prepped with ChloraPrep. The site was clipped. The patient was draped. The patient was positioned supine.

## 2022-10-05 NOTE — ANESTHESIA PROCEDURE NOTES
Peripheral IV Insertion    Diagnosis: I99.8 Other disorder of circulatory system    Patient location during procedure: OR  Procedure start time: 10/5/2022 9:21 AM  Timeout: 10/5/2022 9:20 AM  Procedure end time: 10/5/2022 9:22 AM    Staffing  Authorizing Provider: Lucas Banks MD  Performing Provider: Ezekiel Lara CRNA    Anesthesiologist was present at the time of the procedure.    Preanesthetic Checklist  Completed: patient identified, IV checked, site marked, risks and benefits discussed, surgical consent, monitors and equipment checked, pre-op evaluation, timeout performed and anesthesia consent givenPeripheral IV Insertion  Skin Prep: chlorhexidine gluconate  Orientation: left  Location: antecubital  Catheter Type: peripheral IV (single lumen)  Catheter Size: 18 G  Catheter placement by Anatomical landmarks. Heme positive aspiration all ports. Insertion Attempts: 1  Assessment  Dressing: secured with tape and tegaderm  Patient: Tolerated well  Line flushed easily.

## 2022-10-05 NOTE — TRANSFER OF CARE
"Anesthesia Transfer of Care Note    Patient: Tiny Silverman    Procedure(s) Performed: Procedure(s) (LRB):  STENT, CAROTID W/ PROTECT (N/A)    Patient location: PACU    Anesthesia Type: general    Transport from OR: Transported from OR on room air with adequate spontaneous ventilation    Post pain: adequate analgesia    Post assessment: tolerated procedure well and no apparent anesthetic complications    Post vital signs: stable    Level of consciousness: awake and alert    Nausea/Vomiting: no nausea/vomiting    Complications: none    Transfer of care protocol was followed      Last vitals:   Visit Vitals  /68   Pulse 65   Temp 36.4 °C (97.5 °F) (Oral)   Ht 5' 5" (1.651 m)   SpO2 95%   Breastfeeding No   BMI 32.39 kg/m²     "

## 2022-10-05 NOTE — ANESTHESIA PROCEDURE NOTES
Intubation    Date/Time: 10/5/2022 9:18 AM  Performed by: Ezekiel Lara CRNA  Authorized by: Lucas Banks MD     Intubation:     Induction:  Intravenous    Intubated:  Postinduction    Mask Ventilation:  Easy mask    Attempts:  1    Attempted By:  CRNA    Method of Intubation:  Direct    Blade:  Li 2    Laryngeal View Grade: Grade I - full view of cords      Difficult Airway Encountered?: No      Complications:  None    Airway Device:  Oral endotracheal tube    Airway Device Size:  7.5    Style/Cuff Inflation:  Cuffed    Inflation Amount (mL):  6    Tube secured:  21    Secured at:  The lips    Placement Verified By:  Capnometry    Complicating Factors:  None    Findings Post-Intubation:  Atraumatic/condition of teeth unchanged     See 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

## 2022-10-05 NOTE — INTERVAL H&P NOTE
The patient has been examined and the H&P has been reviewed:    I concur with the findings and no changes have occurred since H&P was written.    Procedure risks, benefits and alternative options discussed and understood by patient/family.          Active Hospital Problems    Diagnosis  POA    Symptomatic carotid artery stenosis without infarction, right [I65.21]  Yes     62-year-old patient with history of TIA and ultrasound evidence of severe right internal carotid artery stenosis        Resolved Hospital Problems   No resolved problems to display.

## 2022-10-05 NOTE — CONSULTS
[facility] - Cath Lab Services  Cardiothoracic Surgery  Consult Note    Patient Name: Tiny Silverman  MRN: [id number]  Admission Date: 10/5/2022  Attending Physician: Mp Olivo MD  Referring Provider: Mp Olivo MD    Patient information was obtained from patient, ER records, and primary team.     Inpatient consult to Cardiothoracic Surgery  Consult performed by: Gurvinder Barros MD  Consult ordered by: Mp Olivo MD  Reason for consult: Symptomatic right internal carotid artery stenosis  Assessment/Recommendations: Patient is a 62-year-old woman with symptomatic right internal carotid artery stenosis who would be at high risk for adverse event with surgical endarterectomy.  Would agree that TCAR revascularization would be the most appropriate mode of treatment for her.  Risks, benefits, and alternatives have been discussed.  Questions have been answered.  Patient voices understanding and agrees to proceed.      Subjective:     Chief Complaint/Reason for Admission:  History of right-sided TIA with high-grade right internal carotid artery stenosis    History of Present Illness:  The patient is a 62-year-old white female with history coronary artery disease status post PCI and recent history of TIA with ultrasound evidence of significantly increased velocities in the right internal carotid artery.  Evaluation has been done by Dr. Olivo and she has been deemed to be a suitable candidate for TCAR.    The patient denies fever, chills, nausea, vomiting, headaches, shortness of breath, or paroxysmal nocturnal dyspnea.  She does complain of some dyspnea on exertion and dizzy spells and symptoms consistent with TIA although she does not relate any lateralizing signs or symptoms.    No current facility-administered medications on file prior to encounter.     Current Outpatient Medications on File Prior to Encounter   Medication Sig    aspirin (ECOTRIN) 81 MG EC tablet aspirin 81 mg tablet,delayed release    Take 1 tablet every day by oral route.    atorvastatin (LIPITOR) 40 MG tablet Take 40 mg by mouth once daily.    busPIRone (BUSPAR) 5 MG Tab Take 5 mg by mouth 2 (two) times daily.    ezetimibe (ZETIA) 10 mg tablet ezetimibe 10 mg tablet    metoprolol tartrate (LOPRESSOR) 25 MG tablet Take 25 mg by mouth 2 (two) times daily.    ranolazine (RANEXA) 1,000 mg Tb12 ranolazine ER 1,000 mg tablet,extended release,12 hr   TAKE 1 TABLET BY MOUTH TWICE A DAY    ticagrelor (BRILINTA) 90 mg tablet Brilinta 90 mg tablet    cyclobenzaprine (FLEXERIL) 10 MG tablet cyclobenzaprine 10 mg tablet   TAKE 1 TABLET BY MOUTH 3 TIMES A DAY AS NEEDED FOR SPASM    ferrous sulfate (FEOSOL) 325 mg (65 mg iron) Tab tablet ferrous sulfate 325 mg (65 mg iron) tablet   TAKE 1 TABLET BY MOUTH TWICE A DAY    fluticasone propionate (FLONASE) 50 mcg/actuation nasal spray 1 spray (50 mcg total) by Each Nostril route 2 (two) times a day.    furosemide (LASIX) 40 MG tablet Take 40 mg by mouth once daily.    nitroGLYCERIN (NITROSTAT) 0.4 MG SL tablet nitroglycerin 0.4 mg sublingual tablet   TAKE 1 TABLET UNDER THE TONGUE ONCE A DAY AS NEEDED FOR CHEST PAIN    ondansetron (ZOFRAN) 4 MG tablet ondansetron HCl 4 mg tablet   TAKE 1 TABLET BY MOUTH EVERY 8 HOURS AS NEEDED FOR NAUSEA AND VOMITING       Review of patient's allergies indicates:   Allergen Reactions    Penicillins        Past Medical History:   Diagnosis Date    Acid reflux     Angina pectoris     Anxiety and depression     CAD (coronary artery disease)     Claustrophobia     HLD (hyperlipidemia)     HTN (hypertension)     Kidney stones     Obesity, unspecified     CROW (obstructive sleep apnea)     PAD (peripheral artery disease)     Pancreatitis     Peripheral arterial disease     SOB (shortness of breath)     TIA (transient ischemic attack)      Past Surgical History:   Procedure Laterality Date    APPENDECTOMY N/A     Application of short arm splint      Carotid Angiogram  08/24/2022     CORONARY STENT PLACEMENT N/A     circumflex    CYst of breast  N/A     Dilation of Coronary Artery       ECTOPIC PREGNANCY SURGERY N/A     Excision of Left Greater Saphenous  N/A     Fluroscopy of Left Heart Using Low Osmolar Contrast  N/A     Immobilzation of Right Lower Arm Using Splint      LAPAROSCOPIC CHOLECYSTECTOMY N/A     LAPAROSCOPIC TOTAL HYSTERECTOMY N/A     Laroscopic  N/A     Percutaneous Transluminal N/A     Resection of Gallbladder      TONSILLECTOMY N/A      Family History       Problem Relation (Age of Onset)    Breast cancer Mother    Cancer Mother    Coronary artery disease Father, Brother    Hypertension Mother, Father, Sister, Brother    Stroke Sister          Tobacco Use    Smoking status: Former     Types: Cigarettes     Quit date: 2015     Years since quittin.2    Smokeless tobacco: Never    Tobacco comments:     Quit 2015   Substance and Sexual Activity    Alcohol use: Not Currently    Drug use: Never    Sexual activity: Not on file     Review of Systems   Constitutional:  Negative for chills, diaphoresis, fatigue and fever.   HENT:  Negative for dental problem.    Eyes:  Positive for visual disturbance.   Respiratory:  Negative for cough, chest tightness, shortness of breath, wheezing and stridor.    Cardiovascular:  Negative for chest pain, palpitations and leg swelling.   Gastrointestinal:  Negative for abdominal distention, abdominal pain, constipation, diarrhea, nausea and vomiting.   Musculoskeletal:  Negative for back pain, neck pain and neck stiffness.   Skin:  Negative for wound.   Neurological:  Positive for dizziness. Negative for weakness, numbness and headaches.   Hematological:  Negative for adenopathy.   Objective:     Vital Signs (Most Recent):  Temp: 97.5 °F (36.4 °C) (10/05/22 0640)  Pulse: 65 (10/05/22 0640)  BP: 103/68 (10/05/22 0640)  SpO2: 95 % (10/05/22 0640) Vital Signs (24h Range):  Temp:  [97.5 °F (36.4 °C)] 97.5 °F (36.4 °C)  Pulse:  [65] 65  SpO2:   [95 %] 95 %  BP: (103)/(68) 103/68        Body mass index is 32.39 kg/m².    SpO2: 95 %        Intake/Output - Last 3 Shifts       None             Lines/Drains/Airways       Airway  Duration                  Airway - Non-Surgical 10/05/22 0805 Endotracheal Tube <1 day              Peripheral Intravenous Line  Duration                  Peripheral IV - Single Lumen 10/05/22 0715 20 G Left;Posterior Hand <1 day                     STS Risk Score: n/a    Physical Exam  Vitals and nursing note reviewed.   Constitutional:       General: She is not in acute distress.     Appearance: Normal appearance.   HENT:      Head: Normocephalic and atraumatic.      Nose: Nose normal. No congestion.      Mouth/Throat:      Mouth: Mucous membranes are moist.   Eyes:      General: No scleral icterus.     Extraocular Movements: Extraocular movements intact.      Conjunctiva/sclera: Conjunctivae normal.      Pupils: Pupils are equal, round, and reactive to light.   Neck:      Thyroid: No thyroid mass or thyromegaly.      Vascular: Carotid bruit (Bilateral) present. No JVD.   Cardiovascular:      Rate and Rhythm: Normal rate and regular rhythm.      Pulses: Normal pulses.           Carotid pulses are 2+ on the right side and 2+ on the left side.       Radial pulses are 2+ on the right side and 2+ on the left side.        Femoral pulses are 2+ on the right side and 2+ on the left side.       Dorsalis pedis pulses are 2+ on the right side and 2+ on the left side.        Posterior tibial pulses are 2+ on the right side and 2+ on the left side.      Heart sounds: No murmur heard.    No friction rub. No gallop.   Pulmonary:      Effort: Pulmonary effort is normal. No respiratory distress.      Breath sounds: Normal breath sounds. No stridor. No wheezing, rhonchi or rales.   Chest:      Chest wall: No tenderness.   Abdominal:      General: Abdomen is flat. Bowel sounds are normal. There is no distension.      Palpations: Abdomen is soft. There  is no hepatomegaly, splenomegaly, mass or pulsatile mass.      Tenderness: There is no abdominal tenderness. There is no rebound.   Musculoskeletal:         General: No swelling. Normal range of motion.      Cervical back: Normal range of motion. No rigidity or tenderness.      Right lower leg: No edema.      Left lower leg: No edema.   Lymphadenopathy:      Cervical: No cervical adenopathy.      Upper Body:      Right upper body: No supraclavicular or axillary adenopathy.      Left upper body: No supraclavicular or axillary adenopathy.   Skin:     General: Skin is warm and dry.   Neurological:      General: No focal deficit present.      Mental Status: She is alert and oriented to person, place, and time. Mental status is at baseline.      Cranial Nerves: No cranial nerve deficit.      Sensory: No sensory deficit.      Motor: No weakness.      Gait: Gait normal.   Psychiatric:         Mood and Affect: Mood normal.       Significant Labs:  All pertinent labs from the last 24 hours have been reviewed.    Significant Diagnostics:  I have reviewed and interpreted all pertinent imaging results/findings within the past 24 hours.    Assessment/Plan:     NYHA Score: NYHA I: cardiac disease, but without resulting limitations of physical activity    Active Diagnoses:    Diagnosis Date Noted POA    Symptomatic carotid artery stenosis without infarction, right [I65.21] 10/05/2022 Yes      Problems Resolved During this Admission:       Thank you for your consult. I will follow-up with patient. Please contact us if you have any additional questions.    Gurvinder Barros MD  Cardiothoracic Surgery  [facility] - Cath Lab Services

## 2022-10-05 NOTE — ANESTHESIA PREPROCEDURE EVALUATION
10/04/2022  Tiny Silverman is a 62 y.o., female, , who presents for the following:    Procedure: STENT, CAROTID W/ PROTECT - TCAR W/ ANEST.   Anesthesia type: General   Diagnosis: Bilateral carotid artery occlusion [I65.23]   Pre-op diagnosis: Bilateral carotid artery occlusion [I65.23]   Location: [facility] CATH LAB 05 / [facility] CATH LAB   Providers: Mp Olivo MD                    TTE (2/5/21) :      Pre-op Assessment    I have reviewed the Patient Summary Reports.    I have reviewed the NPO Status.   I have reviewed the Medications.     Review of Systems  Anesthesia Hx:  No problems with previous Anesthesia    Social:  Former Smoker    Cardiovascular:   Hypertension CAD   Angina, with exertion CAD, s/p stent & CABG (Transmyocardial Revascularization)  PAD Left leg   Pulmonary:   Shortness of breath Sleep Apnea Noncompliant w/ CPAP(recall)   Hepatic/GI:   GERD Pancreatitis   Neurological:   TIA, Some memory deficits   Endocrine:  Obesity / BMI > 30  Psych:   anxiety depression Claustrophobia         Physical Exam  General: Alert and Oriented    Airway:  Mallampati: III   Mouth Opening: Small, but > 3cm  TM Distance: 4 - 6 cm  Tongue: Normal  Neck ROM: Normal ROM    Dental:  Intact    Chest/Lungs:  Normal Respiratory Rate    Heart:  Rate: Normal  Rhythm: Regular Rhythm          LAB:  H/H 14.4/43.5, plt's 285K  K+3.2,  BUN/Cr 23/0.9,  Gluc 105  PT 10.3,  INR 1.0        Anesthesia Plan  Type of Anesthesia, risks & benefits discussed:    Anesthesia Type: Gen ETT  Intra-op Monitoring Plan: Standard ASA Monitors and Art Line  Post Op Pain Control Plan: IV/PO Opioids PRN and multimodal analgesia  Induction:  IV  Airway Plan: Direct, Post-Induction  Informed Consent: Informed consent signed with the Patient and all parties understand the risks and agree with anesthesia plan.  All questions answered. Patient consented  to blood products? Yes  ASA Score: 4  Day of Surgery Review of History & Physical: H&P Update referred to the surgeon/provider.H&P completed by Anesthesiologist.    Ready For Surgery From Anesthesia Perspective.     .

## 2022-10-05 NOTE — BRIEF OP NOTE
[facility] - Cath Lab Services  Brief Operative Note  Cardiology    SUMMARY     Surgery Date: 10/5/2022     Surgeon(s) and Role:     * Mp Olivo MD - Primary     * Gurvinder Barros MD    Pre-op Diagnosis:  Bilateral carotid artery occlusion [I65.23]    Post-op Diagnosis: Post-Op Diagnosis Codes:     * TIA (transient ischemic attack) [G45.9]     * Bilateral carotid artery occlusion [I65.23]    Procedure Performed: TCAR    Procedure(s) (LRB):  STENT, CAROTID W/ PROTECT (N/A)      Operative Findings: TCAR performed for right internal carotid artery.  80% stenosis was noted.  Flow reversal was performed. Balloon angioplasty and stenting was performed successfully.  Patient to continue on aspirin and Plavix. Patient will be admitted overnight.  Aspirin and Brillinta to be continued with statins on DC     Full report to follow

## 2022-10-06 VITALS
BODY MASS INDEX: 32.39 KG/M2 | TEMPERATURE: 98 F | OXYGEN SATURATION: 94 % | RESPIRATION RATE: 18 BRPM | DIASTOLIC BLOOD PRESSURE: 55 MMHG | HEIGHT: 65 IN | HEART RATE: 90 BPM | SYSTOLIC BLOOD PRESSURE: 106 MMHG

## 2022-10-06 LAB
ALBUMIN SERPL-MCNC: 3.2 GM/DL (ref 3.4–4.8)
ALBUMIN/GLOB SERPL: 1.9 RATIO (ref 1.1–2)
ALP SERPL-CCNC: 68 UNIT/L (ref 40–150)
ALT SERPL-CCNC: 18 UNIT/L (ref 0–55)
AST SERPL-CCNC: 20 UNIT/L (ref 5–34)
BASOPHILS # BLD AUTO: 0.02 X10(3)/MCL (ref 0–0.2)
BASOPHILS NFR BLD AUTO: 0.4 %
BILIRUBIN DIRECT+TOT PNL SERPL-MCNC: 0.5 MG/DL
BUN SERPL-MCNC: 9.6 MG/DL (ref 9.8–20.1)
CALCIUM SERPL-MCNC: 8.4 MG/DL (ref 8.4–10.2)
CHLORIDE SERPL-SCNC: 107 MMOL/L (ref 98–107)
CO2 SERPL-SCNC: 23 MMOL/L (ref 23–31)
CREAT SERPL-MCNC: 0.73 MG/DL (ref 0.55–1.02)
EOSINOPHIL # BLD AUTO: 0.13 X10(3)/MCL (ref 0–0.9)
EOSINOPHIL NFR BLD AUTO: 2.4 %
ERYTHROCYTE [DISTWIDTH] IN BLOOD BY AUTOMATED COUNT: 15 % (ref 11.5–17)
GFR SERPLBLD CREATININE-BSD FMLA CKD-EPI: >60 MLS/MIN/1.73/M2
GLOBULIN SER-MCNC: 1.7 GM/DL (ref 2.4–3.5)
GLUCOSE SERPL-MCNC: 89 MG/DL (ref 82–115)
HCT VFR BLD AUTO: 28.5 % (ref 37–47)
HGB BLD-MCNC: 9.4 GM/DL (ref 12–16)
IMM GRANULOCYTES # BLD AUTO: 0.01 X10(3)/MCL (ref 0–0.04)
IMM GRANULOCYTES NFR BLD AUTO: 0.2 %
LYMPHOCYTES # BLD AUTO: 1.41 X10(3)/MCL (ref 0.6–4.6)
LYMPHOCYTES NFR BLD AUTO: 25.9 %
MAGNESIUM SERPL-MCNC: 1.9 MG/DL (ref 1.6–2.6)
MCH RBC QN AUTO: 31.1 PG (ref 27–31)
MCHC RBC AUTO-ENTMCNC: 33 MG/DL (ref 33–36)
MCV RBC AUTO: 94.4 FL (ref 80–94)
MONOCYTES # BLD AUTO: 0.64 X10(3)/MCL (ref 0.1–1.3)
MONOCYTES NFR BLD AUTO: 11.7 %
NEUTROPHILS # BLD AUTO: 3.2 X10(3)/MCL (ref 2.1–9.2)
NEUTROPHILS NFR BLD AUTO: 59.4 %
NRBC BLD AUTO-RTO: 0 %
PLATELET # BLD AUTO: 178 X10(3)/MCL (ref 130–400)
PMV BLD AUTO: 11.7 FL (ref 7.4–10.4)
POTASSIUM SERPL-SCNC: 3.6 MMOL/L (ref 3.5–5.1)
PROT SERPL-MCNC: 4.9 GM/DL (ref 5.8–7.6)
RBC # BLD AUTO: 3.02 X10(6)/MCL (ref 4.2–5.4)
SODIUM SERPL-SCNC: 137 MMOL/L (ref 136–145)
WBC # SPEC AUTO: 5.5 X10(3)/MCL (ref 4.5–11.5)

## 2022-10-06 PROCEDURE — 25000003 PHARM REV CODE 250

## 2022-10-06 PROCEDURE — 25000003 PHARM REV CODE 250: Performed by: NURSE PRACTITIONER

## 2022-10-06 PROCEDURE — 85025 COMPLETE CBC W/AUTO DIFF WBC: CPT | Performed by: INTERNAL MEDICINE

## 2022-10-06 PROCEDURE — 83735 ASSAY OF MAGNESIUM: CPT

## 2022-10-06 PROCEDURE — 80053 COMPREHEN METABOLIC PANEL: CPT

## 2022-10-06 PROCEDURE — 36415 COLL VENOUS BLD VENIPUNCTURE: CPT | Performed by: INTERNAL MEDICINE

## 2022-10-06 PROCEDURE — 25000003 PHARM REV CODE 250: Performed by: INTERNAL MEDICINE

## 2022-10-06 PROCEDURE — 25000242 PHARM REV CODE 250 ALT 637 W/ HCPCS: Performed by: INTERNAL MEDICINE

## 2022-10-06 RX ORDER — FUROSEMIDE 40 MG/1
40 TABLET ORAL DAILY
Start: 2022-10-06 | End: 2023-02-08

## 2022-10-06 RX ORDER — PSEUDOEPHEDRINE HCL 30 MG
60 TABLET ORAL 3 TIMES DAILY
Status: DISCONTINUED | OUTPATIENT
Start: 2022-10-06 | End: 2022-10-06 | Stop reason: HOSPADM

## 2022-10-06 RX ORDER — PSEUDOEPHEDRINE HYDROCHLORIDE 60 MG/1
60 TABLET ORAL 3 TIMES DAILY
Qty: 30 TABLET | Refills: 0 | Status: SHIPPED | OUTPATIENT
Start: 2022-10-06 | End: 2022-10-16

## 2022-10-06 RX ORDER — HYDROCODONE BITARTRATE AND ACETAMINOPHEN 5; 325 MG/1; MG/1
1 TABLET ORAL EVERY 6 HOURS PRN
Qty: 10 TABLET | Refills: 0 | Status: SHIPPED | OUTPATIENT
Start: 2022-10-06 | End: 2023-02-08

## 2022-10-06 RX ADMIN — ASPIRIN 81 MG: 81 TABLET, COATED ORAL at 08:10

## 2022-10-06 RX ADMIN — TICAGRELOR 90 MG: 90 TABLET ORAL at 08:10

## 2022-10-06 RX ADMIN — BUSPIRONE HYDROCHLORIDE 5 MG: 5 TABLET ORAL at 08:10

## 2022-10-06 RX ADMIN — RANOLAZINE 500 MG: 500 TABLET, EXTENDED RELEASE ORAL at 08:10

## 2022-10-06 RX ADMIN — FLUTICASONE PROPIONATE 50 MCG: 50 SPRAY, METERED NASAL at 08:10

## 2022-10-06 RX ADMIN — HYDROCODONE BITARTRATE AND ACETAMINOPHEN 1 TABLET: 5; 325 TABLET ORAL at 08:10

## 2022-10-06 RX ADMIN — PSEUDOEPHEDRINE HCL 60 MG: 30 TABLET, FILM COATED ORAL at 12:10

## 2022-10-06 RX ADMIN — HYDROCODONE BITARTRATE AND ACETAMINOPHEN 1 TABLET: 5; 325 TABLET ORAL at 04:10

## 2022-10-06 RX ADMIN — ATORVASTATIN CALCIUM 40 MG: 40 TABLET, FILM COATED ORAL at 08:10

## 2022-10-06 NOTE — ANESTHESIA POSTPROCEDURE EVALUATION
Anesthesia Post Evaluation    Patient: Tiny Silverman    Procedure(s) Performed: Procedure(s) (LRB):  STENT, CAROTID W/ PROTECT (N/A)    Final Anesthesia Type: general      Patient location during evaluation: PACU  Patient participation: Yes- Able to Participate  Level of consciousness: awake and oriented  Post-procedure vital signs: reviewed and stable  Pain management: adequate  Airway patency: patent    PONV status at discharge: No PONV  Anesthetic complications: no      Cardiovascular status: hemodynamically stable (PACU-tachycardia treated w/ IVF's & Metoprolol)  Respiratory status: unassisted and spontaneous ventilation  Hydration status: euvolemic  Follow-up not needed.          Vitals Value Taken Time   BP 90/48 10/05/22 1530   Temp 36.4 10/05/22 1911   Pulse 63 10/05/22 1530   Resp 14 10/05/22 1530   SpO2 100 % 10/05/22 1530         Event Time   Out of Recovery 15:30:00         Pain/Sheree Score: Pain Rating Prior to Med Admin: 8 (10/5/2022 11:28 AM)  Sheree Score: 10 (10/5/2022  1:30 PM)

## 2022-10-06 NOTE — DISCHARGE SUMMARY
[facility] - 6th Floor Medical Telemetry  Cardiology  Discharge Summary      Patient Name: Tiny Silverman  MRN: [id number]  Admission Date: 10/5/2022  Hospital Length of Stay: 1 days  Discharge Date and Time:  10/06/2022 8:08 AM  Attending Physician: Mp Muniz MD  Discharging Provider: JAZLYN Park  Primary Care Physician: Bonilla Robles MD    HPI:   Ms. Silverman is a 62 year old female who is known to [facility], Dr. Muniz. She presents to [facility] on 10.5.22 for a scheduled TCAR procedure. See procedure below.     Hospital course:   10.6.22: NAD. Denies CP, SOB, or palps. Left groin benign.     Procedure(s) (LRB):  STENT, CAROTID W/ PROTECT (N/A)   TCAR 10.5.22:  Patient underwent successful successful because procedure for symptomatic  right internal carotid stenosis.   Patient will be continued on dual antiplatelet therapy and statin for at least 1 month  Patient will follow up with me as an outpatient with carotid Doppler    Consults:   Consults (From admission, onward)          Status Ordering Provider     Inpatient consult to Cardiothoracic Surgery  Once        Provider:  Gurvinder Barros MD    Completed MP MUNIZ              Final Active Diagnoses:    Diagnosis Date Noted POA    PRINCIPAL PROBLEM:  Symptomatic carotid artery stenosis without infarction, right [I65.21] 10/05/2022 Yes      Problems Resolved During this Admission:       Discharged Condition: stable    Review of Systems   Cardiovascular:  Negative for chest pain and palpitations.   Respiratory:  Negative for shortness of breath.    Neurological:  Negative for dizziness, headaches, numbness and sensory change.     Physical Exam  Constitutional:       Appearance: Normal appearance.   HENT:      Head: Normocephalic.      Nose: Nose normal.      Mouth/Throat:      Mouth: Mucous membranes are moist.   Eyes:      Extraocular Movements: Extraocular movements intact.   Cardiovascular:      Rate and Rhythm: Normal rate and regular  rhythm.      Comments: Left groin soft, nontender. No hematoma noted. +2 peripheral pulse  Pulmonary:      Effort: Pulmonary effort is normal.      Breath sounds: Normal breath sounds.   Abdominal:      Palpations: Abdomen is soft.   Musculoskeletal:      Cervical back: Normal range of motion.   Skin:     General: Skin is warm.      Comments: Right upper chest incision - adhesive glue c/d/i   Neurological:      General: No focal deficit present.      Mental Status: She is alert and oriented to person, place, and time.   Psychiatric:         Mood and Affect: Mood normal.         Behavior: Behavior normal.       Disposition: Home or Self Care    Follow Up:   Follow-up Information       Mp Olivo MD Follow up.    Specialty: Cardiology  Why: 10/13/22 @ 12:30 - carotid ultrasound  10/13/22 @ 1:20 - hospital follow-up  Contact information:  [address] [place] [id number] [phone]                           Patient Instructions:      Diet Cardiac     No driving until:   Order Comments: X 3 days     Lifting restrictions   Order Comments: Nothing > 10 lbs x 3 days     Notify your health care provider if you experience any of the following:  temperature >100.4     Notify your health care provider if you experience any of the following:  redness, tenderness, or signs of infection (pain, swelling, redness, odor or green/yellow discharge around incision site)     Notify your health care provider if you experience any of the following:  difficulty breathing or increased cough     Medications:  Reconciled Home Medications:      Medication List        CONTINUE taking these medications      aspirin 81 MG EC tablet  Commonly known as: ECOTRIN  aspirin 81 mg tablet,delayed release   Take 1 tablet every day by oral route.     atorvastatin 40 MG tablet  Commonly known as: LIPITOR  Take 40 mg by mouth once daily.     busPIRone 5 MG Tab  Commonly known as: BUSPAR  Take 5 mg by mouth 2 (two) times daily.     cyclobenzaprine  10 MG tablet  Commonly known as: FLEXERIL  cyclobenzaprine 10 mg tablet   TAKE 1 TABLET BY MOUTH 3 TIMES A DAY AS NEEDED FOR SPASM     ezetimibe 10 mg tablet  Commonly known as: ZETIA  ezetimibe 10 mg tablet     ferrous sulfate 325 mg (65 mg iron) Tab tablet  Commonly known as: FEOSOL  ferrous sulfate 325 mg (65 mg iron) tablet   TAKE 1 TABLET BY MOUTH TWICE A DAY     fluticasone propionate 50 mcg/actuation nasal spray  Commonly known as: FLONASE  1 spray (50 mcg total) by Each Nostril route 2 (two) times a day.     furosemide 40 MG tablet  Commonly known as: LASIX  Take 40 mg by mouth once daily.     metoprolol tartrate 25 MG tablet  Commonly known as: LOPRESSOR  Take 25 mg by mouth 2 (two) times daily.     nitroGLYCERIN 0.4 MG SL tablet  Commonly known as: NITROSTAT  nitroglycerin 0.4 mg sublingual tablet   TAKE 1 TABLET UNDER THE TONGUE ONCE A DAY AS NEEDED FOR CHEST PAIN     ondansetron 4 MG tablet  Commonly known as: ZOFRAN  ondansetron HCl 4 mg tablet   TAKE 1 TABLET BY MOUTH EVERY 8 HOURS AS NEEDED FOR NAUSEA AND VOMITING     ranolazine 1,000 mg Tb12  Commonly known as: RANEXA  ranolazine ER 1,000 mg tablet,extended release,12 hr   TAKE 1 TABLET BY MOUTH TWICE A DAY     ticagrelor 90 mg tablet  Commonly known as: BRILINTA  Brilinta 90 mg tablet              Impression:  ELYSSA    - s/p right TCAR (10.5.22)  CAD  TIA  PAD  HTN  HLD    Plan:   Pt is slightly hypotensive this am. Will monitor closely. If improved this afternoon, okay to discharge home per Dr. Olivo. Hold lopressor & use Lasix PRN. Will address at follow-up appointment.   Continue DAPT: ASA & brillinta and statin.  Groin precautions reviewed.  Instructed to report any stroke like symptoms.   Pt complaining of incisional pain. Wrote prescription for Norco 5/325 mg Q 6 hr PRN pain #10.   Keep scheduled carotid ultrasound/follow-up appointment with Dr. Olivo on 10.13.22.    Time spent on the discharge of patient:  > 30 minutes    Emily Parker  FNP  Cardiology  [facility] - 6th Floor Medical Telemetry

## 2022-10-08 ENCOUNTER — PATIENT MESSAGE (OUTPATIENT)
Dept: ADMINISTRATIVE | Facility: OTHER | Age: 62
End: 2022-10-08
Payer: COMMERCIAL

## 2022-10-10 ENCOUNTER — TELEPHONE (OUTPATIENT)
Dept: ADMINISTRATIVE | Facility: CLINIC | Age: 62
End: 2022-10-10
Payer: COMMERCIAL

## 2022-10-11 ENCOUNTER — LAB VISIT (OUTPATIENT)
Dept: LAB | Facility: HOSPITAL | Age: 62
End: 2022-10-11
Attending: NURSE PRACTITIONER
Payer: COMMERCIAL

## 2022-10-11 DIAGNOSIS — G45.9 INTERMITTENT CEREBRAL ISCHEMIA: ICD-10-CM

## 2022-10-11 DIAGNOSIS — I25.10 CORONARY ATHEROSCLEROSIS OF NATIVE CORONARY ARTERY: Primary | ICD-10-CM

## 2022-10-11 DIAGNOSIS — I10 ESSENTIAL HYPERTENSION, MALIGNANT: ICD-10-CM

## 2022-10-11 DIAGNOSIS — E78.5 HYPERLIPIDEMIA, UNSPECIFIED HYPERLIPIDEMIA TYPE: ICD-10-CM

## 2022-10-11 LAB
ANION GAP SERPL CALC-SCNC: 6 MEQ/L
BUN SERPL-MCNC: 12 MG/DL (ref 9.8–20.1)
CALCIUM SERPL-MCNC: 9.6 MG/DL (ref 8.4–10.2)
CHLORIDE SERPL-SCNC: 103 MMOL/L (ref 98–107)
CO2 SERPL-SCNC: 27 MMOL/L (ref 23–31)
CREAT SERPL-MCNC: 0.83 MG/DL (ref 0.55–1.02)
CREAT/UREA NIT SERPL: 14
ERYTHROCYTE [DISTWIDTH] IN BLOOD BY AUTOMATED COUNT: 14.3 % (ref 11.5–17)
GFR SERPLBLD CREATININE-BSD FMLA CKD-EPI: >60 MLS/MIN/1.73/M2
GLUCOSE SERPL-MCNC: 111 MG/DL (ref 82–115)
HCT VFR BLD AUTO: 35.9 % (ref 37–47)
HGB BLD-MCNC: 12 GM/DL (ref 12–16)
MCH RBC QN AUTO: 31.3 PG (ref 27–31)
MCHC RBC AUTO-ENTMCNC: 33.4 MG/DL (ref 33–36)
MCV RBC AUTO: 93.7 FL (ref 80–94)
NRBC BLD AUTO-RTO: 0 %
PLATELET # BLD AUTO: 227 X10(3)/MCL (ref 130–400)
PMV BLD AUTO: 11.2 FL (ref 7.4–10.4)
POTASSIUM SERPL-SCNC: 4.6 MMOL/L (ref 3.5–5.1)
RBC # BLD AUTO: 3.83 X10(6)/MCL (ref 4.2–5.4)
SODIUM SERPL-SCNC: 136 MMOL/L (ref 136–145)
WBC # SPEC AUTO: 4.6 X10(3)/MCL (ref 4.5–11.5)

## 2022-10-11 PROCEDURE — 80048 BASIC METABOLIC PNL TOTAL CA: CPT

## 2022-10-11 PROCEDURE — 36415 COLL VENOUS BLD VENIPUNCTURE: CPT

## 2022-10-11 PROCEDURE — 85027 COMPLETE CBC AUTOMATED: CPT

## 2022-10-12 ENCOUNTER — OFFICE VISIT (OUTPATIENT)
Dept: INTERNAL MEDICINE | Facility: CLINIC | Age: 62
End: 2022-10-12
Payer: COMMERCIAL

## 2022-10-12 VITALS
DIASTOLIC BLOOD PRESSURE: 70 MMHG | RESPIRATION RATE: 16 BRPM | BODY MASS INDEX: 31.8 KG/M2 | HEART RATE: 59 BPM | WEIGHT: 190.88 LBS | OXYGEN SATURATION: 96 % | TEMPERATURE: 97 F | SYSTOLIC BLOOD PRESSURE: 116 MMHG | HEIGHT: 65 IN

## 2022-10-12 DIAGNOSIS — D50.9 IRON DEFICIENCY ANEMIA, UNSPECIFIED IRON DEFICIENCY ANEMIA TYPE: ICD-10-CM

## 2022-10-12 DIAGNOSIS — I65.21 SYMPTOMATIC CAROTID ARTERY STENOSIS WITHOUT INFARCTION, RIGHT: Primary | ICD-10-CM

## 2022-10-12 DIAGNOSIS — R00.1 BRADYCARDIA: ICD-10-CM

## 2022-10-12 DIAGNOSIS — F32.A DEPRESSION, UNSPECIFIED DEPRESSION TYPE: ICD-10-CM

## 2022-10-12 DIAGNOSIS — R06.00 DYSPNEA, UNSPECIFIED TYPE: ICD-10-CM

## 2022-10-12 PROCEDURE — 3078F DIAST BP <80 MM HG: CPT | Mod: CPTII,,, | Performed by: INTERNAL MEDICINE

## 2022-10-12 PROCEDURE — 3008F PR BODY MASS INDEX (BMI) DOCUMENTED: ICD-10-PCS | Mod: CPTII,,, | Performed by: INTERNAL MEDICINE

## 2022-10-12 PROCEDURE — 3078F PR MOST RECENT DIASTOLIC BLOOD PRESSURE < 80 MM HG: ICD-10-PCS | Mod: CPTII,,, | Performed by: INTERNAL MEDICINE

## 2022-10-12 PROCEDURE — 3074F SYST BP LT 130 MM HG: CPT | Mod: CPTII,,, | Performed by: INTERNAL MEDICINE

## 2022-10-12 PROCEDURE — 1159F PR MEDICATION LIST DOCUMENTED IN MEDICAL RECORD: ICD-10-PCS | Mod: CPTII,,, | Performed by: INTERNAL MEDICINE

## 2022-10-12 PROCEDURE — 1159F MED LIST DOCD IN RCRD: CPT | Mod: CPTII,,, | Performed by: INTERNAL MEDICINE

## 2022-10-12 PROCEDURE — 3008F BODY MASS INDEX DOCD: CPT | Mod: CPTII,,, | Performed by: INTERNAL MEDICINE

## 2022-10-12 PROCEDURE — 99496 TRANSITIONAL CARE MANAGE SERVICE 7 DAY DISCHARGE: ICD-10-PCS | Mod: ,,, | Performed by: INTERNAL MEDICINE

## 2022-10-12 PROCEDURE — 99496 TRANSJ CARE MGMT HIGH F2F 7D: CPT | Mod: ,,, | Performed by: INTERNAL MEDICINE

## 2022-10-12 PROCEDURE — 3074F PR MOST RECENT SYSTOLIC BLOOD PRESSURE < 130 MM HG: ICD-10-PCS | Mod: CPTII,,, | Performed by: INTERNAL MEDICINE

## 2022-10-12 RX ORDER — ESCITALOPRAM OXALATE 20 MG/1
10 TABLET ORAL DAILY
Qty: 90 TABLET | Refills: 3 | Status: SHIPPED | OUTPATIENT
Start: 2022-10-12 | End: 2023-03-16 | Stop reason: SDUPTHER

## 2022-10-12 RX ORDER — ESCITALOPRAM OXALATE 20 MG/1
20 TABLET ORAL DAILY
COMMUNITY
Start: 2022-09-09 | End: 2022-10-12 | Stop reason: SDUPTHER

## 2022-10-12 NOTE — ASSESSMENT & PLAN NOTE
She currently has her beta-blocker on hold, previously on chronic metoprolol.  She follows up with her cardiologist tomorrow.  She states the symptoms that she had prior to her carotid surgery that wooshing in her ear is gone.

## 2022-10-12 NOTE — PROGRESS NOTES
Family and/or Caretaker present at visit?  No.  Diagnostic tests reviewed/disposition: No diagnosic tests pending after this hospitalization.  Disease/illness education: given  Home health/community services discussion/referrals: Patient does not have home health established from hospital visit.  They do not need home health.  If needed, we will set up home health for the patient.   Establishment or re-establishment of referral orders for community resources: No other necessary community resources.   Discussion with other health care providers:  Dr Olivo; goes to see him tomorrow .  I reviewed and reconciled the medications from hospital discharge.      Subjective:      Patient ID: Tiny Silverman is a 62 y.o. female.    Chief Complaint: Follow-up (Hospital F/U from Bilateral Carotid Artery occlusion)      HPI:  Patient Name: Tiny Silverman  MRN: [id number]  Admission Date: 10/5/2022  Hospital Length of Stay: 1 days  Discharge Date and Time:  10/06/2022 8:08 AM  Attending Physician: Mp Olivo MD  Discharging Provider: JAZLYN Park  Primary Care Physician: Bonilla Robles MD     HPI:   Ms. Silverman is a 62 year old female who is known to [facility], Dr. Olivo. She presents to [facility] on 10.5.22 for a scheduled TCAR procedure. See procedure below.      Hospital course:   10.6.22: NAD. Denies CP, SOB, or palps. Left groin benign.      Procedure(s) (LRB):  STENT, CAROTID W/ PROTECT (N/A)   TCAR 10.5.22:  Patient underwent successful successful because procedure for symptomatic  right internal carotid stenosis.   Patient will be continued on dual antiplatelet therapy and statin for at least 1 month  Patient will follow up with me as an outpatient with carotid Doppler    History of COVID 19 infection 1/2021 with monoclonal antibody on 1-  History of anxiety, severe coronary artery disease status post previous stent placements x7 , peripheral arterial disease, hypertension, hyperlipidemia,  elective CABG  per Dr. Flores. Reported attempted CAB ×1 with left lower extremity endoscopic venous harvesting but unsuccessful; underwent transmyocardial laser revascularization.    History of subdural hematoma, monitored by trauma service as well as neurosurgery.  Dr. Olivo-cardiology; carotid disease  Colonoscopy in October 2018 by Dr. Byrnes, repeat in 5 years.    She tells me that she feels rather flat and unmotivated she is on Lexapro 20.  She states that she feels like she is near the end of her life in that she may die soon.  She has no suicidal or homicidal ideation.  She would like to see a therapist.      Past Medical History:  Past Medical History:   Diagnosis Date    Acid reflux     Angina pectoris     Anxiety and depression     CAD (coronary artery disease)     Claustrophobia     HLD (hyperlipidemia)     HTN (hypertension)     Kidney stones     Obesity, unspecified     CROW (obstructive sleep apnea)     PAD (peripheral artery disease)     Pancreatitis     Peripheral arterial disease     SOB (shortness of breath)     TIA (transient ischemic attack)      Past Surgical History:   Procedure Laterality Date    APPENDECTOMY N/A     Application of short arm splint      Carotid Angiogram  08/24/2022    CORONARY STENT PLACEMENT N/A     circumflex    CYst of breast  N/A     Dilation of Coronary Artery       ECTOPIC PREGNANCY SURGERY N/A     Excision of Left Greater Saphenous  N/A     Fluroscopy of Left Heart Using Low Osmolar Contrast  N/A     Immobilzation of Right Lower Arm Using Splint      LAPAROSCOPIC CHOLECYSTECTOMY N/A     LAPAROSCOPIC TOTAL HYSTERECTOMY N/A     Laroscopic  N/A     Percutaneous Transluminal N/A     Resection of Gallbladder      TONSILLECTOMY N/A      Review of patient's allergies indicates:   Allergen Reactions    Penicillins      Other reaction(s): was uanable to arouse     Current Outpatient Medications on File Prior to Visit   Medication Sig Dispense Refill    aspirin (ECOTRIN) 81 MG EC tablet  aspirin 81 mg tablet,delayed release   Take 1 tablet every day by oral route.      atorvastatin (LIPITOR) 40 MG tablet Take 40 mg by mouth once daily.      busPIRone (BUSPAR) 5 MG Tab Take 5 mg by mouth 2 (two) times daily.      cyclobenzaprine (FLEXERIL) 10 MG tablet cyclobenzaprine 10 mg tablet   TAKE 1 TABLET BY MOUTH 3 TIMES A DAY AS NEEDED FOR SPASM      ezetimibe (ZETIA) 10 mg tablet ezetimibe 10 mg tablet      ferrous sulfate (FEOSOL) 325 mg (65 mg iron) Tab tablet ferrous sulfate 325 mg (65 mg iron) tablet   TAKE 1 TABLET BY MOUTH TWICE A DAY      fluticasone propionate (FLONASE) 50 mcg/actuation nasal spray 1 spray (50 mcg total) by Each Nostril route 2 (two) times a day. 16 g 3    furosemide (LASIX) 40 MG tablet Take 1 tablet (40 mg total) by mouth once daily.      HYDROcodone-acetaminophen (NORCO) 5-325 mg per tablet Take 1 tablet by mouth every 6 (six) hours as needed for Pain. 10 tablet 0    nitroGLYCERIN (NITROSTAT) 0.4 MG SL tablet nitroglycerin 0.4 mg sublingual tablet   TAKE 1 TABLET UNDER THE TONGUE ONCE A DAY AS NEEDED FOR CHEST PAIN      ondansetron (ZOFRAN) 4 MG tablet ondansetron HCl 4 mg tablet   TAKE 1 TABLET BY MOUTH EVERY 8 HOURS AS NEEDED FOR NAUSEA AND VOMITING      pseudoephedrine (SUDAFED) 60 MG tablet Take 1 tablet (60 mg total) by mouth 3 (three) times daily. for 10 days 30 tablet 0    ranolazine (RANEXA) 1,000 mg Tb12 ranolazine ER 1,000 mg tablet,extended release,12 hr   TAKE 1 TABLET BY MOUTH TWICE A DAY      ticagrelor (BRILINTA) 90 mg tablet Brilinta 90 mg tablet      [DISCONTINUED] EScitalopram oxalate (LEXAPRO) 20 MG tablet Take 20 mg by mouth once daily.       No current facility-administered medications on file prior to visit.     Social History     Socioeconomic History    Marital status: Single   Tobacco Use    Smoking status: Former     Types: Cigarettes     Quit date: 2015     Years since quittin.2    Smokeless tobacco: Never    Tobacco comments:     Quit July  "2015   Substance and Sexual Activity    Alcohol use: Not Currently    Drug use: Never    Sexual activity: Not Currently     Family History   Problem Relation Age of Onset    Cancer Mother     Hypertension Mother     Breast cancer Mother     Hypertension Father     Coronary artery disease Father     Stroke Sister     Hypertension Sister     Hypertension Brother     Coronary artery disease Brother        Review of Systems  A comprehensive review of systems was performed and was negative with exception of what is documented above.     Objective:   /70 (BP Location: Left arm, Patient Position: Sitting, BP Method: Medium (Manual))   Pulse (!) 59   Temp 97.3 °F (36.3 °C) (Temporal)   Resp 16   Ht 5' 5" (1.651 m)   Wt 86.6 kg (190 lb 14.4 oz)   SpO2 96%   BMI 31.77 kg/m²   Physical Exam  General : Alert and oriented, No acute distress, afebrile.  Eye : PERRLA. EOMI. Normal conjunctiva, Sclerae are nonicteric.   Respiratory : Respirations are non-labored and clear to auscultation bilaterally. Symmetrical air entry bilaterally, no crackles, no wheezes, no rhonchi. No cyanosis, no clubbing.  Cardiovascular : Normal rate, Regular rhythm. No murmurs, rubs, or gallops. Pulses are 2+ throughout. No JVD. No Edema.  Gastrointestinal : Soft, nontender, non-distended, bowel sounds are present in all quadrants, no organomegaly, no guarding, no rebound.  Musculoskeletal : Normal range of motion throughout. No muscle tenderness.  Integumentary : Warm, moist, intact.  Neurologic : Alert, Oriented  Lymph: No palpable lymphadenopathy appreciated.  Psychiatric : Cooperative, Appropriate mood & flat  Assessment/ Plan:   1. Symptomatic carotid artery stenosis without infarction, right  Overview:  62-year-old patient with history of TIA and ultrasound evidence of severe right internal carotid artery stenosis    Assessment & Plan:  Status post treatment with Dr Olivo      2. Iron deficiency anemia, unspecified iron deficiency " anemia type    3. Depression, unspecified depression type  Assessment & Plan:  On questioning whether patient is on too much Lexapro encouraged her to move it to nighttime and cut it in half and see if that does not help, continue BuSpar, referral to psychologist.    Orders:  -     Ambulatory referral/consult to Psychology; Future; Expected date: 10/19/2022    4. Bradycardia  Assessment & Plan:  She currently has her beta-blocker on hold, previously on chronic metoprolol.  She follows up with her cardiologist tomorrow.  She states the symptoms that she had prior to her carotid surgery that wooshing in her ear is gone.      5. Dyspnea, unspecified type    Other orders  -     EScitalopram oxalate (LEXAPRO) 20 MG tablet; Take 0.5 tablets (10 mg total) by mouth once daily.  Dispense: 90 tablet; Refill: 3           Follow up if symptoms worsen or fail to improve.

## 2022-10-12 NOTE — ASSESSMENT & PLAN NOTE
On questioning whether patient is on too much Lexapro encouraged her to move it to nighttime and cut it in half and see if that does not help, continue BuSpar, referral to psychologist.

## 2022-10-13 ENCOUNTER — PATIENT MESSAGE (OUTPATIENT)
Dept: ADMINISTRATIVE | Facility: OTHER | Age: 62
End: 2022-10-13
Payer: COMMERCIAL

## 2022-10-14 DIAGNOSIS — I25.10 CORONARY ARTERY DISEASE: Primary | ICD-10-CM

## 2022-10-14 DIAGNOSIS — R60.0 LOCAL EDEMA: ICD-10-CM

## 2022-10-14 DIAGNOSIS — E78.5 DYSLIPIDEMIA: ICD-10-CM

## 2022-10-20 ENCOUNTER — OFFICE VISIT (OUTPATIENT)
Dept: NEUROLOGY | Facility: CLINIC | Age: 62
End: 2022-10-20
Payer: COMMERCIAL

## 2022-10-20 ENCOUNTER — PATIENT MESSAGE (OUTPATIENT)
Dept: ADMINISTRATIVE | Facility: OTHER | Age: 62
End: 2022-10-20
Payer: COMMERCIAL

## 2022-10-20 VITALS
BODY MASS INDEX: 31.16 KG/M2 | DIASTOLIC BLOOD PRESSURE: 78 MMHG | WEIGHT: 187 LBS | SYSTOLIC BLOOD PRESSURE: 130 MMHG | HEIGHT: 65 IN

## 2022-10-20 DIAGNOSIS — R41.3 MEMORY LOSS: Primary | ICD-10-CM

## 2022-10-20 DIAGNOSIS — I63.9 CEREBROVASCULAR ACCIDENT (CVA), UNSPECIFIED MECHANISM: ICD-10-CM

## 2022-10-20 PROCEDURE — 3078F PR MOST RECENT DIASTOLIC BLOOD PRESSURE < 80 MM HG: ICD-10-PCS | Mod: CPTII,S$GLB,, | Performed by: PSYCHIATRY & NEUROLOGY

## 2022-10-20 PROCEDURE — 3075F SYST BP GE 130 - 139MM HG: CPT | Mod: CPTII,S$GLB,, | Performed by: PSYCHIATRY & NEUROLOGY

## 2022-10-20 PROCEDURE — 1111F PR DISCHARGE MEDS RECONCILED W/ CURRENT OUTPATIENT MED LIST: ICD-10-PCS | Mod: CPTII,S$GLB,, | Performed by: PSYCHIATRY & NEUROLOGY

## 2022-10-20 PROCEDURE — 99204 PR OFFICE/OUTPT VISIT, NEW, LEVL IV, 45-59 MIN: ICD-10-PCS | Mod: S$GLB,,, | Performed by: PSYCHIATRY & NEUROLOGY

## 2022-10-20 PROCEDURE — 1111F DSCHRG MED/CURRENT MED MERGE: CPT | Mod: CPTII,S$GLB,, | Performed by: PSYCHIATRY & NEUROLOGY

## 2022-10-20 PROCEDURE — 3075F PR MOST RECENT SYSTOLIC BLOOD PRESS GE 130-139MM HG: ICD-10-PCS | Mod: CPTII,S$GLB,, | Performed by: PSYCHIATRY & NEUROLOGY

## 2022-10-20 PROCEDURE — 1159F PR MEDICATION LIST DOCUMENTED IN MEDICAL RECORD: ICD-10-PCS | Mod: CPTII,S$GLB,, | Performed by: PSYCHIATRY & NEUROLOGY

## 2022-10-20 PROCEDURE — 99999 PR PBB SHADOW E&M-EST. PATIENT-LVL IV: ICD-10-PCS | Mod: PBBFAC,,, | Performed by: PSYCHIATRY & NEUROLOGY

## 2022-10-20 PROCEDURE — 99204 OFFICE O/P NEW MOD 45 MIN: CPT | Mod: S$GLB,,, | Performed by: PSYCHIATRY & NEUROLOGY

## 2022-10-20 PROCEDURE — 1159F MED LIST DOCD IN RCRD: CPT | Mod: CPTII,S$GLB,, | Performed by: PSYCHIATRY & NEUROLOGY

## 2022-10-20 PROCEDURE — 3078F DIAST BP <80 MM HG: CPT | Mod: CPTII,S$GLB,, | Performed by: PSYCHIATRY & NEUROLOGY

## 2022-10-20 PROCEDURE — 99999 PR PBB SHADOW E&M-EST. PATIENT-LVL IV: CPT | Mod: PBBFAC,,, | Performed by: PSYCHIATRY & NEUROLOGY

## 2022-10-20 NOTE — PROGRESS NOTES
"Neurology OFFICE VISIT NOTE  Tiny Silverman  [id number]  10/20/2022      Chief Complaint: Establish Care (New Pt referred by Dr Kaylee Clark for neuro consult), Cerebrovascular Accident (Per referral. Pt denies having stroke. Incident in 2020 w/head injury, ED/ICU wanted to r/o TIA), Memory Loss (MMSE 29/30 - c/o issues w/task order - unable to adapt to changes in routine. Doesn't drive often besides to work; does not handle finances), Sleep Apnea (Had sleep study. Pressure 17. Does not use, states couldn't get used to it. Would prefer not to use if possible), and Dizziness (C/o constant dizziness; reports 1 fall)      HPI    Tiny Silverman is a 62 y.o. female  presenting to neurology clinic for complaints of memory loss.  Patient had subdural hematoma in 3/2020 in ICU.  Since that accident patient has been having problems with word finding, hearing swooshing in her ears, smelling chemicals in her home that are not there.  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  Grocery shops online herself, sometimes orders to many of one item but states it is because the font is too small.  She pays her bills online.  Has CROW but does not use CPAP, to uncomfortable to sleep with.  Patient is very anxious and is worried that her brain is okay.  Sister had a stroke at the age of 32.  Patient had a R internal carotid stent placed last month and states that since then she has noticed that her "head is less foggy and the swooshing in her ears is less."  Patient has a referral to psychiatry placed by her PCP.        ROS:  CONSTITUTIONAL: No weight loss, fever, chills, or weakness.    HEENT: Eyes: No visual loss or blurred vision.   Ears, Nose, Throat: No congestion, runny nose or sore throat.    CARDIOVASCULAR: No chest pain, chest pressure, or chest discomfort. No palpitations.    RESPIRATORY: No shortness of breath, cough, or sputum.    GASTROINTESTINAL: No " nausea, vomiting or diarrhea. No abdominal pain.  NEUROLOGICAL: No headache, dizziness, numbness, or tingling in the extremities. No change in bowel or bladder control.    MUSCULOSKELETAL: No muscle, back pain, joint pain, or stiffness.     Vitals:    10/20/22 0851   BP: 130/78       PE:  General: appears well, in no acute distress   Eye: no scleral icterus   HENT: MMM  Respiratory: clear to auscultation bilaterally, nonlabored respirations   Cardiovascular: regular rate and rhythm without murmurs or gallops, no edema in bilateral lower extremities   Neuro: No focal lesions observed, speaks in clear full sentences, normal gait without use of assistive devices       Current Medications:   Current Outpatient Medications   Medication Sig Dispense Refill    aspirin (ECOTRIN) 81 MG EC tablet aspirin 81 mg tablet,delayed release   Take 1 tablet every day by oral route.      atorvastatin (LIPITOR) 40 MG tablet Take 40 mg by mouth once daily.      busPIRone (BUSPAR) 5 MG Tab Take 1 tablet (5 mg total) by mouth 2 (two) times daily. 180 tablet 3    EScitalopram oxalate (LEXAPRO) 20 MG tablet Take 0.5 tablets (10 mg total) by mouth once daily. 90 tablet 3    ezetimibe (ZETIA) 10 mg tablet ezetimibe 10 mg tablet      ferrous sulfate (FEOSOL) 325 mg (65 mg iron) Tab tablet once daily.      fluticasone propionate (FLONASE) 50 mcg/actuation nasal spray 1 spray (50 mcg total) by Each Nostril route 2 (two) times a day. 16 g 3    nitroGLYCERIN (NITROSTAT) 0.4 MG SL tablet nitroglycerin 0.4 mg sublingual tablet   TAKE 1 TABLET UNDER THE TONGUE ONCE A DAY AS NEEDED FOR CHEST PAIN      ondansetron (ZOFRAN) 4 MG tablet ondansetron HCl 4 mg tablet   TAKE 1 TABLET BY MOUTH EVERY 8 HOURS AS NEEDED FOR NAUSEA AND VOMITING      ranolazine (RANEXA) 1,000 mg Tb12 ranolazine ER 1,000 mg tablet,extended release,12 hr   TAKE 1 TABLET BY MOUTH TWICE A DAY      ticagrelor (BRILINTA) 90 mg tablet Brilinta 90 mg tablet      cyclobenzaprine (FLEXERIL)  10 MG tablet cyclobenzaprine 10 mg tablet   TAKE 1 TABLET BY MOUTH 3 TIMES A DAY AS NEEDED FOR SPASM      furosemide (LASIX) 40 MG tablet Take 1 tablet (40 mg total) by mouth once daily. (Patient not taking: Reported on 10/20/2022)      HYDROcodone-acetaminophen (NORCO) 5-325 mg per tablet Take 1 tablet by mouth every 6 (six) hours as needed for Pain. (Patient not taking: Reported on 10/20/2022) 10 tablet 0     No current facility-administered medications for this visit.       Assessment:   1. Memory loss    2. Cerebrovascular accident (CVA), unspecified mechanism        Plan:  -reviewed previous scans with patient  -MMSE today 29/30, unchanged from 2/2022  -advised patient to establish care with psychiatry  -can call for acupuncture when ready     PRN follow up     Ivone Emery M.D.  [facility]    I was present with the resident during the history and exam.    [x ] I discussed the case with the resident and agree with the findings and plan as documented in the resident's note.  [ ] I discussed the case with the resident and agree with the findings and plan as documented in the resident's note except:    Adam N Foreman, MD [facility]

## 2022-10-20 NOTE — PROGRESS NOTES
I was present with the resident during the history and exam.    [x ] I discussed the case with the resident and agree with the findings and plan as documented in the resident's note.  [ ] I discussed the case with the resident and agree with the findings and plan as documented in the resident's note except:    Adam N Foreman, MD [facility]

## 2022-10-27 ENCOUNTER — PATIENT MESSAGE (OUTPATIENT)
Dept: ADMINISTRATIVE | Facility: OTHER | Age: 62
End: 2022-10-27
Payer: COMMERCIAL

## 2022-11-03 ENCOUNTER — PATIENT MESSAGE (OUTPATIENT)
Dept: ADMINISTRATIVE | Facility: OTHER | Age: 62
End: 2022-11-03
Payer: COMMERCIAL

## 2022-12-03 ENCOUNTER — DOCUMENTATION ONLY (OUTPATIENT)
Dept: INTERNAL MEDICINE | Facility: CLINIC | Age: 62
End: 2022-12-03
Payer: COMMERCIAL

## 2022-12-09 ENCOUNTER — HOSPITAL ENCOUNTER (INPATIENT)
Facility: HOSPITAL | Age: 62
LOS: 1 days | Discharge: LEFT BEFORE DISCHARGE | DRG: 440 | End: 2022-12-11
Attending: EMERGENCY MEDICINE | Admitting: EMERGENCY MEDICINE
Payer: COMMERCIAL

## 2022-12-09 DIAGNOSIS — R74.01 TRANSAMINITIS: ICD-10-CM

## 2022-12-09 DIAGNOSIS — R10.9 ABDOMINAL PAIN: ICD-10-CM

## 2022-12-09 DIAGNOSIS — K85.90 ACUTE PANCREATITIS WITHOUT INFECTION OR NECROSIS, UNSPECIFIED PANCREATITIS TYPE: Primary | ICD-10-CM

## 2022-12-09 LAB
ALBUMIN SERPL-MCNC: 3.9 GM/DL (ref 3.4–4.8)
ALBUMIN/GLOB SERPL: 1.5 RATIO (ref 1.1–2)
ALP SERPL-CCNC: 167 UNIT/L (ref 40–150)
ALT SERPL-CCNC: 74 UNIT/L (ref 0–55)
AST SERPL-CCNC: 123 UNIT/L (ref 5–34)
BASOPHILS # BLD AUTO: 0.02 X10(3)/MCL (ref 0–0.2)
BASOPHILS NFR BLD AUTO: 0.2 %
BILIRUBIN DIRECT+TOT PNL SERPL-MCNC: 0.8 MG/DL
BUN SERPL-MCNC: 23.2 MG/DL (ref 9.8–20.1)
CALCIUM SERPL-MCNC: 9.7 MG/DL (ref 8.4–10.2)
CHLORIDE SERPL-SCNC: 103 MMOL/L (ref 98–107)
CO2 SERPL-SCNC: 27 MMOL/L (ref 23–31)
CREAT SERPL-MCNC: 0.81 MG/DL (ref 0.55–1.02)
EOSINOPHIL # BLD AUTO: 0.07 X10(3)/MCL (ref 0–0.9)
EOSINOPHIL NFR BLD AUTO: 0.6 %
ERYTHROCYTE [DISTWIDTH] IN BLOOD BY AUTOMATED COUNT: 12.9 % (ref 11.5–17)
GFR SERPLBLD CREATININE-BSD FMLA CKD-EPI: >60 MLS/MIN/1.73/M2
GLOBULIN SER-MCNC: 2.6 GM/DL (ref 2.4–3.5)
GLUCOSE SERPL-MCNC: 107 MG/DL (ref 82–115)
HCT VFR BLD AUTO: 35.9 % (ref 37–47)
HGB BLD-MCNC: 11.5 GM/DL (ref 12–16)
IMM GRANULOCYTES # BLD AUTO: 0.05 X10(3)/MCL (ref 0–0.04)
IMM GRANULOCYTES NFR BLD AUTO: 0.4 %
LIPASE SERPL-CCNC: 827 U/L
LYMPHOCYTES # BLD AUTO: 1.06 X10(3)/MCL (ref 0.6–4.6)
LYMPHOCYTES NFR BLD AUTO: 8.9 %
MCH RBC QN AUTO: 31.1 PG (ref 27–31)
MCHC RBC AUTO-ENTMCNC: 32 MG/DL (ref 33–36)
MCV RBC AUTO: 97 FL (ref 80–94)
MONOCYTES # BLD AUTO: 0.99 X10(3)/MCL (ref 0.1–1.3)
MONOCYTES NFR BLD AUTO: 8.3 %
NEUTROPHILS # BLD AUTO: 9.7 X10(3)/MCL (ref 2.1–9.2)
NEUTROPHILS NFR BLD AUTO: 81.6 %
NRBC BLD AUTO-RTO: 0 %
PLATELET # BLD AUTO: 325 X10(3)/MCL (ref 130–400)
PMV BLD AUTO: 10.2 FL (ref 7.4–10.4)
POTASSIUM SERPL-SCNC: 3.8 MMOL/L (ref 3.5–5.1)
PROT SERPL-MCNC: 6.5 GM/DL (ref 5.8–7.6)
RBC # BLD AUTO: 3.7 X10(6)/MCL (ref 4.2–5.4)
SODIUM SERPL-SCNC: 139 MMOL/L (ref 136–145)
WBC # SPEC AUTO: 11.9 X10(3)/MCL (ref 4.5–11.5)

## 2022-12-09 PROCEDURE — 99285 EMERGENCY DEPT VISIT HI MDM: CPT

## 2022-12-09 PROCEDURE — 80053 COMPREHEN METABOLIC PANEL: CPT | Performed by: EMERGENCY MEDICINE

## 2022-12-09 PROCEDURE — 93005 ELECTROCARDIOGRAM TRACING: CPT

## 2022-12-09 PROCEDURE — 96361 HYDRATE IV INFUSION ADD-ON: CPT

## 2022-12-09 PROCEDURE — 93010 EKG 12-LEAD: ICD-10-PCS | Mod: ,,, | Performed by: INTERNAL MEDICINE

## 2022-12-09 PROCEDURE — 96374 THER/PROPH/DIAG INJ IV PUSH: CPT

## 2022-12-09 PROCEDURE — 85025 COMPLETE CBC W/AUTO DIFF WBC: CPT | Performed by: EMERGENCY MEDICINE

## 2022-12-09 PROCEDURE — 93010 ELECTROCARDIOGRAM REPORT: CPT | Mod: ,,, | Performed by: INTERNAL MEDICINE

## 2022-12-09 PROCEDURE — 83690 ASSAY OF LIPASE: CPT | Performed by: EMERGENCY MEDICINE

## 2022-12-10 PROBLEM — I73.9 PAD (PERIPHERAL ARTERY DISEASE): Status: ACTIVE | Noted: 2022-12-10

## 2022-12-10 PROBLEM — I25.10 CAD (CORONARY ARTERY DISEASE): Status: ACTIVE | Noted: 2022-12-10

## 2022-12-10 PROBLEM — I10 HTN (HYPERTENSION): Status: ACTIVE | Noted: 2022-12-10

## 2022-12-10 PROBLEM — K85.90 ACUTE PANCREATITIS: Status: ACTIVE | Noted: 2022-12-10

## 2022-12-10 PROBLEM — E78.5 HLD (HYPERLIPIDEMIA): Status: ACTIVE | Noted: 2022-12-10

## 2022-12-10 PROBLEM — F41.9 ANXIETY AND DEPRESSION: Status: ACTIVE | Noted: 2022-10-12

## 2022-12-10 LAB
APPEARANCE UR: ABNORMAL
BACTERIA #/AREA URNS AUTO: NORMAL /HPF
BILIRUB UR QL STRIP.AUTO: NEGATIVE MG/DL
COLOR UR AUTO: ABNORMAL
GLUCOSE UR QL STRIP.AUTO: NEGATIVE MG/DL
KETONES UR QL STRIP.AUTO: ABNORMAL MG/DL
LEUKOCYTE ESTERASE UR QL STRIP.AUTO: ABNORMAL UNIT/L
NITRITE UR QL STRIP.AUTO: NEGATIVE
PH UR STRIP.AUTO: 5.5 [PH]
PROT UR QL STRIP.AUTO: ABNORMAL MG/DL
RBC #/AREA URNS AUTO: NORMAL /HPF
RBC UR QL AUTO: NEGATIVE UNIT/L
SP GR UR STRIP.AUTO: 1.03 (ref 1–1.03)
SQUAMOUS #/AREA URNS AUTO: <5 /HPF
UROBILINOGEN UR STRIP-ACNC: 1 MG/DL
WBC #/AREA URNS AUTO: 5 /HPF

## 2022-12-10 PROCEDURE — 25000003 PHARM REV CODE 250: Performed by: INTERNAL MEDICINE

## 2022-12-10 PROCEDURE — 25000003 PHARM REV CODE 250: Performed by: EMERGENCY MEDICINE

## 2022-12-10 PROCEDURE — 81001 URINALYSIS AUTO W/SCOPE: CPT | Performed by: EMERGENCY MEDICINE

## 2022-12-10 PROCEDURE — 11000001 HC ACUTE MED/SURG PRIVATE ROOM

## 2022-12-10 PROCEDURE — 25500020 PHARM REV CODE 255: Performed by: EMERGENCY MEDICINE

## 2022-12-10 PROCEDURE — 63600175 PHARM REV CODE 636 W HCPCS: Performed by: EMERGENCY MEDICINE

## 2022-12-10 PROCEDURE — 21400001 HC TELEMETRY ROOM

## 2022-12-10 PROCEDURE — 99223 1ST HOSP IP/OBS HIGH 75: CPT | Mod: ,,, | Performed by: INTERNAL MEDICINE

## 2022-12-10 PROCEDURE — 99223 PR INITIAL HOSPITAL CARE,LEVL III: ICD-10-PCS | Mod: ,,, | Performed by: INTERNAL MEDICINE

## 2022-12-10 PROCEDURE — 81003 URINALYSIS AUTO W/O SCOPE: CPT | Performed by: EMERGENCY MEDICINE

## 2022-12-10 RX ORDER — PROCHLORPERAZINE EDISYLATE 5 MG/ML
5 INJECTION INTRAMUSCULAR; INTRAVENOUS EVERY 6 HOURS PRN
Status: DISCONTINUED | OUTPATIENT
Start: 2022-12-10 | End: 2022-12-11 | Stop reason: HOSPADM

## 2022-12-10 RX ORDER — HYDROMORPHONE HYDROCHLORIDE 2 MG/ML
1 INJECTION, SOLUTION INTRAMUSCULAR; INTRAVENOUS; SUBCUTANEOUS EVERY 4 HOURS PRN
Status: DISCONTINUED | OUTPATIENT
Start: 2022-12-10 | End: 2022-12-11 | Stop reason: HOSPADM

## 2022-12-10 RX ORDER — MORPHINE SULFATE 4 MG/ML
4 INJECTION, SOLUTION INTRAMUSCULAR; INTRAVENOUS EVERY 4 HOURS PRN
Status: DISCONTINUED | OUTPATIENT
Start: 2022-12-10 | End: 2022-12-11 | Stop reason: HOSPADM

## 2022-12-10 RX ORDER — POLYETHYLENE GLYCOL 3350 17 G/17G
17 POWDER, FOR SOLUTION ORAL DAILY
Status: DISCONTINUED | OUTPATIENT
Start: 2022-12-10 | End: 2022-12-11 | Stop reason: HOSPADM

## 2022-12-10 RX ORDER — SODIUM CHLORIDE 0.9 % (FLUSH) 0.9 %
10 SYRINGE (ML) INJECTION
Status: DISCONTINUED | OUTPATIENT
Start: 2022-12-10 | End: 2022-12-11 | Stop reason: HOSPADM

## 2022-12-10 RX ORDER — EZETIMIBE 10 MG/1
10 TABLET ORAL DAILY
Status: DISCONTINUED | OUTPATIENT
Start: 2022-12-10 | End: 2022-12-11 | Stop reason: HOSPADM

## 2022-12-10 RX ORDER — FAMOTIDINE 20 MG/1
20 TABLET, FILM COATED ORAL 2 TIMES DAILY
Status: DISCONTINUED | OUTPATIENT
Start: 2022-12-10 | End: 2022-12-11 | Stop reason: HOSPADM

## 2022-12-10 RX ORDER — BUSPIRONE HYDROCHLORIDE 5 MG/1
5 TABLET ORAL 2 TIMES DAILY
Status: DISCONTINUED | OUTPATIENT
Start: 2022-12-10 | End: 2022-12-11 | Stop reason: HOSPADM

## 2022-12-10 RX ORDER — ACETAMINOPHEN 500 MG
1000 TABLET ORAL EVERY 6 HOURS PRN
Status: DISCONTINUED | OUTPATIENT
Start: 2022-12-10 | End: 2022-12-11 | Stop reason: HOSPADM

## 2022-12-10 RX ORDER — HYDROCODONE BITARTRATE AND ACETAMINOPHEN 5; 325 MG/1; MG/1
1 TABLET ORAL EVERY 6 HOURS PRN
Status: DISCONTINUED | OUTPATIENT
Start: 2022-12-10 | End: 2022-12-11 | Stop reason: HOSPADM

## 2022-12-10 RX ORDER — ONDANSETRON 4 MG/1
4 TABLET, ORALLY DISINTEGRATING ORAL EVERY 6 HOURS PRN
Status: DISCONTINUED | OUTPATIENT
Start: 2022-12-10 | End: 2022-12-11 | Stop reason: HOSPADM

## 2022-12-10 RX ORDER — ONDANSETRON 2 MG/ML
4 INJECTION INTRAMUSCULAR; INTRAVENOUS EVERY 4 HOURS PRN
Status: DISCONTINUED | OUTPATIENT
Start: 2022-12-10 | End: 2022-12-11 | Stop reason: HOSPADM

## 2022-12-10 RX ORDER — MORPHINE SULFATE 4 MG/ML
4 INJECTION, SOLUTION INTRAMUSCULAR; INTRAVENOUS
Status: DISCONTINUED | OUTPATIENT
Start: 2022-12-10 | End: 2022-12-10

## 2022-12-10 RX ORDER — TALC
6 POWDER (GRAM) TOPICAL NIGHTLY PRN
Status: DISCONTINUED | OUTPATIENT
Start: 2022-12-10 | End: 2022-12-11 | Stop reason: HOSPADM

## 2022-12-10 RX ORDER — ONDANSETRON 2 MG/ML
4 INJECTION INTRAMUSCULAR; INTRAVENOUS
Status: COMPLETED | OUTPATIENT
Start: 2022-12-10 | End: 2022-12-10

## 2022-12-10 RX ORDER — ATORVASTATIN CALCIUM 40 MG/1
40 TABLET, FILM COATED ORAL DAILY
Status: DISCONTINUED | OUTPATIENT
Start: 2022-12-10 | End: 2022-12-11 | Stop reason: HOSPADM

## 2022-12-10 RX ORDER — ESCITALOPRAM OXALATE 10 MG/1
10 TABLET ORAL DAILY
Status: DISCONTINUED | OUTPATIENT
Start: 2022-12-10 | End: 2022-12-11 | Stop reason: HOSPADM

## 2022-12-10 RX ORDER — ASPIRIN 81 MG/1
81 TABLET ORAL DAILY
Status: DISCONTINUED | OUTPATIENT
Start: 2022-12-10 | End: 2022-12-11 | Stop reason: HOSPADM

## 2022-12-10 RX ORDER — SODIUM CHLORIDE, SODIUM LACTATE, POTASSIUM CHLORIDE, CALCIUM CHLORIDE 600; 310; 30; 20 MG/100ML; MG/100ML; MG/100ML; MG/100ML
INJECTION, SOLUTION INTRAVENOUS CONTINUOUS
Status: DISCONTINUED | OUTPATIENT
Start: 2022-12-10 | End: 2022-12-11 | Stop reason: HOSPADM

## 2022-12-10 RX ADMIN — POLYETHYLENE GLYCOL 3350 17 G: 17 POWDER, FOR SOLUTION ORAL at 11:12

## 2022-12-10 RX ADMIN — ASPIRIN 81 MG: 81 TABLET, COATED ORAL at 11:12

## 2022-12-10 RX ADMIN — SODIUM CHLORIDE, POTASSIUM CHLORIDE, SODIUM LACTATE AND CALCIUM CHLORIDE: 600; 310; 30; 20 INJECTION, SOLUTION INTRAVENOUS at 11:12

## 2022-12-10 RX ADMIN — IOPAMIDOL 100 ML: 755 INJECTION, SOLUTION INTRAVENOUS at 01:12

## 2022-12-10 RX ADMIN — ESCITALOPRAM OXALATE 10 MG: 10 TABLET ORAL at 11:12

## 2022-12-10 RX ADMIN — BUSPIRONE HYDROCHLORIDE 5 MG: 5 TABLET ORAL at 11:12

## 2022-12-10 RX ADMIN — FAMOTIDINE 20 MG: 20 TABLET, FILM COATED ORAL at 08:12

## 2022-12-10 RX ADMIN — TICAGRELOR 90 MG: 90 TABLET ORAL at 10:12

## 2022-12-10 RX ADMIN — TICAGRELOR 90 MG: 90 TABLET ORAL at 08:12

## 2022-12-10 RX ADMIN — FAMOTIDINE 20 MG: 20 TABLET, FILM COATED ORAL at 10:12

## 2022-12-10 RX ADMIN — BUSPIRONE HYDROCHLORIDE 5 MG: 5 TABLET ORAL at 08:12

## 2022-12-10 RX ADMIN — ATORVASTATIN CALCIUM 40 MG: 40 TABLET, FILM COATED ORAL at 11:12

## 2022-12-10 RX ADMIN — ONDANSETRON 4 MG: 2 INJECTION INTRAMUSCULAR; INTRAVENOUS at 01:12

## 2022-12-10 RX ADMIN — SODIUM CHLORIDE 1000 ML: 9 INJECTION, SOLUTION INTRAVENOUS at 01:12

## 2022-12-10 RX ADMIN — ACETAMINOPHEN 1000 MG: 500 TABLET ORAL at 11:12

## 2022-12-10 RX ADMIN — EZETIMIBE 10 MG: 10 TABLET ORAL at 11:12

## 2022-12-10 NOTE — H&P
[facility]  Emergency [facility]  [facility] Medicine  History & Physical    Patient Name: Tiny Silverman  MRN: [id number]  Patient Class: IP- Inpatient  Admission Date: 12/9/2022  Attending Physician: Bonilla Robles, *   Primary Care Provider: Bonilla Robles MD         Patient information was obtained from patient and ER records.     Subjective:     Principal Problem:Acute pancreatitis    Chief Complaint:   Chief Complaint   Patient presents with    Abdominal Pain     Complaint of upper abdominal pain, with radiation to back.  Very nauseated, but has not vomited. Symptoms started 2 days ago        HPI: Ms. Silverman is a 62 year old female with CAD, PAD, a history of pancreatitis in 2020, HLD, anxiety and depression along with other medical diagnoses who presented to the ER last night with a 3 day history of progressive back pain.  It started with soreness in her back a few days ago that was worse with walking.  It seemed somewhat improved with tylenol, and she assumed it was musculoskeletal.  But over the past few days, the pain worsened, traveled up to her mid back and around to her chest.  The pain was severe and prompted her to come in for further evaluation.  It was limiting her walking and reaching.  She wasn't having any significant abdominal pain until yesterday.  And she does feel some nausea, but there hasn't been any vomiting.  Her last BM was this morning and was kind of hard.  She had pancreatitis in 2020 when she had to have her gall bladder removed.  She did start taking glucosamine chondroitin last week.  The pain did improve some since she's been here overnight, but its starting to get worse again this am.  She does report that she is hungry.    Patient Active Problem List   Diagnosis    Symptomatic carotid artery stenosis without infarction, right    Iron deficiency anemia    Anxiety and depression    Bradycardia    Dyspnea    HTN (hypertension)    HLD (hyperlipidemia)     PAD (peripheral artery disease)    CAD (coronary artery disease)    Acute pancreatitis         Past Medical History:   Diagnosis Date    Acid reflux     Angina pectoris     Anxiety and depression     Blockage of coronary artery of heart     CAD (coronary artery disease)     Claustrophobia     Claustrophobia     Mild    HLD (hyperlipidemia)     HTN (hypertension)     Kidney stones     Obesity, unspecified     CROW (obstructive sleep apnea)     PAD (peripheral artery disease)     Pain in right arm     Pancreatitis     Pancreatitis     Peripheral arterial disease     Personal history of colonic polyps 10/12/2018    Dr. Bonilla Zavala    Pseudoaneurysm     SOB (shortness of breath)     TIA (transient ischemic attack)     Weakness of right hand        Past Surgical History:   Procedure Laterality Date    APPENDECTOMY  1977    Application of short arm splint      Carotid Angiogram  08/24/2022    COLONOSCOPY W/ POLYPECTOMY  10/12/2018    Dr. Bonilla Zavala    CORONARY STENT PLACEMENT N/A     circumflex    Cyst of breast  2002    Dilation of Coronary Artery       ECTOPIC PREGNANCY SURGERY      Excision of Left Greater Saphenous  N/A     Fluroscopy of Left Heart Using Low Osmolar Contrast  N/A     Immobilzation of Right Lower Arm Using Splint      LAPAROSCOPIC CHOLECYSTECTOMY N/A     LAPAROSCOPIC TOTAL HYSTERECTOMY  07/26/1998    Laroscopic  N/A     Percutaneous Transluminal N/A     Resection of Gallbladder      TONSILLECTOMY  1962    TRANSCAROTID ARTERY REVASCULARIZATION (TCAR) Right        Review of patient's allergies indicates:   Allergen Reactions    Penicillins      Other reaction(s): was uanable to arouse       No current facility-administered medications on file prior to encounter.     Current Outpatient Medications on File Prior to Encounter   Medication Sig    aspirin (ECOTRIN) 81 MG EC tablet aspirin 81 mg tablet,delayed release   Take 1 tablet every day by oral route.     atorvastatin (LIPITOR) 40 MG tablet Take 40 mg by mouth once daily.    busPIRone (BUSPAR) 5 MG Tab Take 1 tablet (5 mg total) by mouth 2 (two) times daily.    cyclobenzaprine (FLEXERIL) 10 MG tablet cyclobenzaprine 10 mg tablet   TAKE 1 TABLET BY MOUTH 3 TIMES A DAY AS NEEDED FOR SPASM    EScitalopram oxalate (LEXAPRO) 20 MG tablet Take 0.5 tablets (10 mg total) by mouth once daily.    ezetimibe (ZETIA) 10 mg tablet ezetimibe 10 mg tablet    ferrous sulfate (FEOSOL) 325 mg (65 mg iron) Tab tablet once daily.    fluticasone propionate (FLONASE) 50 mcg/actuation nasal spray 1 spray (50 mcg total) by Each Nostril route 2 (two) times a day.    furosemide (LASIX) 40 MG tablet Take 1 tablet (40 mg total) by mouth once daily. (Patient not taking: Reported on 10/20/2022)    HYDROcodone-acetaminophen (NORCO) 5-325 mg per tablet Take 1 tablet by mouth every 6 (six) hours as needed for Pain. (Patient not taking: Reported on 10/20/2022)    nitroGLYCERIN (NITROSTAT) 0.4 MG SL tablet nitroglycerin 0.4 mg sublingual tablet   TAKE 1 TABLET UNDER THE TONGUE ONCE A DAY AS NEEDED FOR CHEST PAIN    ondansetron (ZOFRAN) 4 MG tablet ondansetron HCl 4 mg tablet   TAKE 1 TABLET BY MOUTH EVERY 8 HOURS AS NEEDED FOR NAUSEA AND VOMITING    ranolazine (RANEXA) 1,000 mg Tb12 ranolazine ER 1,000 mg tablet,extended release,12 hr   TAKE 1 TABLET BY MOUTH TWICE A DAY    ticagrelor (BRILINTA) 90 mg tablet Brilinta 90 mg tablet     Family History       Problem Relation (Age of Onset)    Breast cancer Mother    Cancer Mother    Coronary artery disease Father, Brother    Hypertension Mother, Father, Sister, Brother    Lung cancer Mother    Stroke Sister          Tobacco Use    Smoking status: Former     Types: Cigarettes     Quit date: 2015     Years since quittin.4    Smokeless tobacco: Never    Tobacco comments:     Quit 2015   Substance and Sexual Activity    Alcohol use: Not Currently    Drug use: Never    Sexual  activity: Not Currently     Review of Systems   Constitutional:  Negative for fever.   HENT: Negative.     Eyes: Negative.    Respiratory: Negative.     Cardiovascular:  Positive for chest pain.   Gastrointestinal:  Positive for abdominal pain, constipation and nausea. Negative for diarrhea and vomiting.   Endocrine: Positive for cold intolerance.   Genitourinary: Negative.    Musculoskeletal:  Positive for back pain.   Skin: Negative.    Neurological:  Positive for headaches.   Psychiatric/Behavioral:  The patient is nervous/anxious.    Objective:     Vital Signs (Most Recent):  Temp: 97.7 °F (36.5 °C) (12/09/22 2124)  Pulse: 70 (12/10/22 0704)  Resp: 16 (12/10/22 0704)  BP: (!) 154/93 (12/10/22 0704)  SpO2: 97 % (12/10/22 0704)   Vital Signs (24h Range):  Temp:  [97.7 °F (36.5 °C)] 97.7 °F (36.5 °C)  Pulse:  [69-70] 70  Resp:  [15-22] 16  SpO2:  [94 %-98 %] 97 %  BP: (147-165)/(68-93) 154/93     Weight: 82.9 kg (182 lb 12.2 oz)  Body mass index is 29.37 kg/m².    Physical Exam  Constitutional:       General: She is not in acute distress.     Appearance: Normal appearance.   HENT:      Head: Normocephalic and atraumatic.   Eyes:      General: No scleral icterus.     Conjunctiva/sclera: Conjunctivae normal.   Neck:      Vascular: No carotid bruit.   Cardiovascular:      Rate and Rhythm: Normal rate and regular rhythm.      Pulses: Normal pulses.      Heart sounds: Normal heart sounds. No murmur heard.    No friction rub. No gallop.   Pulmonary:      Effort: Pulmonary effort is normal.      Breath sounds: Normal breath sounds.   Abdominal:      General: Bowel sounds are normal.      Palpations: Abdomen is soft. There is no mass.      Tenderness: There is abdominal tenderness (mild ttp without any rebound or guarding). There is no guarding or rebound.   Musculoskeletal:         General: No deformity.      Cervical back: No rigidity or tenderness.      Right lower leg: No edema.      Left lower leg: No edema.    Lymphadenopathy:      Cervical: No cervical adenopathy.   Skin:     Coloration: Skin is not jaundiced or pale.      Findings: No erythema.   Neurological:      General: No focal deficit present.      Mental Status: She is alert and oriented to person, place, and time.      Gait: Gait normal.   Psychiatric:         Mood and Affect: Mood normal.         Behavior: Behavior normal.         Thought Content: Thought content normal.         Judgment: Judgment normal.           Significant Labs: All pertinent labs within the past 24 hours have been reviewed.  Recent Lab Results         12/10/22  0130   12/09/22  2234        Albumin/Globulin Ratio   1.5       Albumin   3.9       Alkaline Phosphatase   167       ALT   74       Appearance, UA Cloudy         AST   123       Bacteria, UA None Seen         Baso #   0.02       Basophil %   0.2       BILIRUBIN TOTAL   0.8       Bilirubin, UA Negative         BUN   23.2       Calcium   9.7       Chloride   103       CO2   27       Color, UA Dark Yellow         Creatinine   0.81       eGFR   >60       Eos #   0.07       Eosinophil %   0.6       Globulin, Total   2.6       Glucose   107       Glucose, UA Negative         HEMATOCRIT   35.9       HEMOGLOBIN   11.5       Immature Grans (Abs)   0.05       Immature Granulocytes   0.4       Ketones, UA Trace         Leukocytes, UA 1+         Lipase   827       Lymph #   1.06       LYMPH %   8.9       MCH   31.1       MCHC   32.0       MCV   97.0       Mono #   0.99       Mono %   8.3       MPV   10.2       Neut #   9.7       Neut %   81.6       NITRITE UA Negative         nRBC   0.0       Occult Blood UA Negative         pH, UA 5.5         Platelets   325       Potassium   3.8       PROTEIN TOTAL   6.5       Protein, UA 1+         RBC   3.70       RBC, UA 0-2         RDW   12.9       Sodium   139       Specific Gravity,UA 1.030         Squam Epithel, UA <5         Urobilinogen, UA 1.0         WBC, UA 5         WBC   11.9                Significant Imaging: I have reviewed all pertinent imaging results/findings within the past 24 hours.  CT: I have reviewed all pertinent results/findings within the past 24 hours and my personal findings are: pulmonary nodule that has grown from 3 to 4.4 mm.  But no other acute concerning pathology.  EKG shows no concerning st or t wave changes.    Assessment/Plan:     * Acute pancreatitis  Will continue with IV fluids, clear liquid diet as tolerated and monitor. No clear etiology at this point.  Liver enzymes suggestive of possible obstruction.  Her only new medication is glucosamine chondroitin, which she just started a week ago.  Will follow and consider GI consultation tomorrow if not significantly improved.      CAD (coronary artery disease)  EKG without any acute changes.      PAD (peripheral artery disease)  She is s/p recent carotid stent.      HTN (hypertension)  She's off meds.  Monitor.      Anxiety and depression  Stable, cont lexapro.          VTE Risk Mitigation (From admission, onward)    None             Court Canchola MD  Department of Hospital Medicine   [facility] - Emergency Dept

## 2022-12-10 NOTE — HPI
Ms. Silverman is a 62 year old female with CAD, PAD, a history of pancreatitis in 2020, HLD, anxiety and depression along with other medical diagnoses who presented to the ER last night with a 3 day history of progressive back pain.  It started with soreness in her back a few days ago that was worse with walking.  It seemed somewhat improved with tylenol, and she assumed it was musculoskeletal.  But over the past few days, the pain worsened, traveled up to her mid back and around to her chest.  The pain was severe and prompted her to come in for further evaluation.  It was limiting her walking and reaching.  She wasn't having any significant abdominal pain until yesterday.  And she does feel some nausea, but there hasn't been any vomiting.  Her last BM was this morning and was kind of hard.  She had pancreatitis in 2020 when she had to have her gall bladder removed.  She did start taking glucosamine chondroitin last week.  The pain did improve some since she's been here overnight, but its starting to get worse again this am.  She does report that she is hungry.

## 2022-12-10 NOTE — ASSESSMENT & PLAN NOTE
Will continue with IV fluids, clear liquid diet as tolerated and monitor. No clear etiology at this point.  Liver enzymes suggestive of possible obstruction.  Her only new medication is glucosamine chondroitin, which she just started a week ago.  Will follow and consider GI consultation tomorrow if not significantly improved.

## 2022-12-10 NOTE — ED PROVIDER NOTES
Encounter Date: 12/9/2022    SCRIBE #1 NOTE: I, Geovany Ybarra, am scribing for, and in the presence of,  Cierra Marcano DO. I have scribed the following portions of the note - Other sections scribed: HPI, ROS, PE.     History     Chief Complaint   Patient presents with    Abdominal Pain     Complaint of upper abdominal pain, with radiation to back.  Very nauseated, but has not vomited. Symptoms started 2 days ago     62 year old female with past medical history of CAD, HTN, HLD, and pancreatitis presents to ED c/o upper abdomina pain onset 2 days ago. Pt states that pain radiates to her back and all over her abdomen. Pt states that pain is intermittent. Pt reports that pain feels similar to prior pancreatitis. Pt reports that walking and reaching makes pain worse. Pt reports that nothing makes pain better. Pt states taht she has tried OTC medications and hot compresses to relieve pain. Pt reports she has additional symptom of nausea. Pt denies dysuria, vomiting, and fever. Pt staes she hasn't had a bowel movement in a few days which is her normal.    The history is provided by the patient. No  was used.   Abdominal Pain  The onset of the illness was abrupt. The problem has not changed since onset.The abdominal pain is located in the epigastric region. The abdominal pain radiates to the LLQ, RLQ, RUQ, LUQ and back. The abdominal pain is relieved by nothing. The abdominal pain is exacerbated by movement. The other symptoms of the illness include nausea. The other symptoms of the illness do not include fever, shortness of breath, vomiting, diarrhea or dysuria.   Nausea began 2 days ago.   Risk factors for an acute abdominal problem include a history of abdominal surgery. Symptoms associated with the illness do not include chills.   Review of patient's allergies indicates:   Allergen Reactions    Penicillins      Other reaction(s): was uanable to arouse     Past Medical History:   Diagnosis Date     Acid reflux     Angina pectoris     Anxiety and depression     Blockage of coronary artery of heart     CAD (coronary artery disease)     Claustrophobia     Claustrophobia     Mild    HLD (hyperlipidemia)     HTN (hypertension)     Kidney stones     Obesity, unspecified     CROW (obstructive sleep apnea)     PAD (peripheral artery disease)     Pain in right arm     Pancreatitis     Pancreatitis     Peripheral arterial disease     Personal history of colonic polyps 10/12/2018    Dr. Bonilla Zavala    Pseudoaneurysm     SOB (shortness of breath)     TIA (transient ischemic attack)     Weakness of right hand      Past Surgical History:   Procedure Laterality Date    APPENDECTOMY      Application of short arm splint      Carotid Angiogram  2022    COLONOSCOPY W/ POLYPECTOMY  10/12/2018    Dr. Bonilla Zavala    CORONARY STENT PLACEMENT N/A     circumflex    Cyst of breast  2002    Dilation of Coronary Artery       ECTOPIC PREGNANCY SURGERY      Excision of Left Greater Saphenous  N/A     Fluroscopy of Left Heart Using Low Osmolar Contrast  N/A     Immobilzation of Right Lower Arm Using Splint      LAPAROSCOPIC CHOLECYSTECTOMY N/A     LAPAROSCOPIC TOTAL HYSTERECTOMY  1998    Laroscopic  N/A     Percutaneous Transluminal N/A     Resection of Gallbladder      TONSILLECTOMY  1962     Family History   Problem Relation Age of Onset    Cancer Mother     Hypertension Mother     Breast cancer Mother     Lung cancer Mother     Hypertension Father     Coronary artery disease Father     Stroke Sister     Hypertension Sister     Hypertension Brother     Coronary artery disease Brother         CABG     Social History     Tobacco Use    Smoking status: Former     Types: Cigarettes     Quit date: 2015     Years since quittin.4    Smokeless tobacco: Never    Tobacco comments:     Quit 2015   Substance Use Topics    Alcohol use: Not Currently    Drug use: Never     Review of Systems   Constitutional:  Negative  for chills and fever.   HENT:  Negative for congestion and sore throat.    Eyes:  Negative for pain and visual disturbance.   Respiratory:  Negative for cough and shortness of breath.    Cardiovascular:  Negative for chest pain and leg swelling.   Gastrointestinal:  Positive for abdominal pain and nausea. Negative for diarrhea and vomiting.   Genitourinary:  Negative for difficulty urinating, dysuria and menstrual problem.   Skin:  Negative for rash and wound.   Allergic/Immunologic: Negative for food allergies and immunocompromised state.   Neurological:  Negative for seizures, syncope and weakness.     Physical Exam     Initial Vitals [12/09/22 2124]   BP Pulse Resp Temp SpO2   (!) 147/80 69 (!) 22 97.7 °F (36.5 °C) 98 %      MAP       --         Physical Exam    Nursing note and vitals reviewed.  Constitutional: She appears well-developed and well-nourished. She is not diaphoretic. She does not appear ill. No distress.   HENT:   Head: Normocephalic and atraumatic.   Right Ear: External ear normal.   Left Ear: External ear normal.   Mouth/Throat: Oropharynx is clear and moist.   Eyes: Conjunctivae and EOM are normal. Pupils are equal, round, and reactive to light.   Neck: Neck supple. No tracheal deviation present.   Cardiovascular:  Normal rate, regular rhythm, normal heart sounds and intact distal pulses.           No murmur heard.  Pulmonary/Chest: Breath sounds normal. No respiratory distress. She has no wheezes. She has no rhonchi. She has no rales.   Abdominal: Abdomen is soft. Bowel sounds are normal. She exhibits no distension. There is abdominal tenderness.   Moderate epigastric tenderness, mild generalized tenderness   No right CVA tenderness.  No left CVA tenderness.   Musculoskeletal:         General: Normal range of motion.      Cervical back: Neck supple.     Neurological: She is alert and oriented to person, place, and time. She has normal strength. No cranial nerve deficit or sensory deficit. GCS  score is 15. GCS eye subscore is 4. GCS verbal subscore is 5. GCS motor subscore is 6.   Skin: Skin is warm and dry. Capillary refill takes less than 2 seconds. No rash noted. No pallor.   Psychiatric: She has a normal mood and affect. Her behavior is normal.       ED Course   Procedures  Labs Reviewed   COMPREHENSIVE METABOLIC PANEL - Abnormal; Notable for the following components:       Result Value    Blood Urea Nitrogen 23.2 (*)     Alkaline Phosphatase 167 (*)     Alanine Aminotransferase 74 (*)     Aspartate Aminotransferase 123 (*)     All other components within normal limits   URINALYSIS, REFLEX TO URINE CULTURE - Abnormal; Notable for the following components:    Appearance, UA Cloudy (*)     Protein, UA 1+ (*)     Ketones, UA Trace (*)     Leukocyte Esterase, UA 1+ (*)     All other components within normal limits   LIPASE - Abnormal; Notable for the following components:    Lipase Level 827 (*)     All other components within normal limits   CBC WITH DIFFERENTIAL - Abnormal; Notable for the following components:    WBC 11.9 (*)     RBC 3.70 (*)     Hgb 11.5 (*)     Hct 35.9 (*)     MCV 97.0 (*)     MCH 31.1 (*)     MCHC 32.0 (*)     Neut # 9.7 (*)     IG# 0.05 (*)     All other components within normal limits   URINALYSIS, MICROSCOPIC - Normal   CBC W/ AUTO DIFFERENTIAL    Narrative:     The following orders were created for panel order CBC auto differential.  Procedure                               Abnormality         Status                     ---------                               -----------         ------                     CBC with Differential[[id number]]        Abnormal            Final result                 Please view results for these tests on the individual orders.     EKG Readings: (Independently Interpreted)   Initial Reading: No STEMI. Rhythm: Normal Sinus Rhythm. Heart Rate: 65. Ectopy: No Ectopy. Conduction: Normal. ST Segments: Normal ST Segments. T Waves: Normal. Axis: Normal.    Performed 12/09/22 at 2124     Imaging Results              CT Abdomen Pelvis With Contrast (Preliminary result)  Result time 12/10/22 02:03:28      Preliminary result by Sam Polanco MD (12/10/22 02:03:28)                   Narrative:    START OF REPORT:  Technique: CT of the abdomen and pelvis was performed with axial images as well as sagittal and coronal reconstruction images with intravenous contrast.    Comparison: Comparison is with study dated â2022-03-23 17:12:26â.    Clinical History: Upper abdominal pain.    Dosage Information: Automated Exposure Control was utilized 565.34 mGy.cm.    Findings:  Lines and Tubes: None.  Thorax:  Lungs: There is minimal nonspecific dependent change at the lung bases. The previously seen 3 mm right basal subpleural nodule, now measures 4.4 mm and is otherwise unchanged.  Pleura: No effusions or thickening are seen. No pneumothorax is seen in the visualized lung bases.  Heart: The heart size is within normal limits.  Abdomen:  Abdominal Wall: There is a subtle uncomplicated umbilical hernia which contains mesenteric fat.  Liver: There is mild intrahepatic biliary duct dilatation in this patient status post cholecystectomy. The liver otherwise appears unremarkable.  Biliary System: No extrahepatic biliary duct dilatation is seen.  Gallbladder: Surgical clips are seen in the gallbladder fossa consistent with prior cholecystectomy.  Pancreas: The pancreas appears unremarkable.  Spleen: The spleen appears unremarkable.  Adrenals: The adrenal glands appear unremarkable.  Kidneys: The right kidney appears unremarkable with no stones cysts masses or hydronephrosis. Left kidney. A single stone measuring â3.1 mmâ is seen on âImage 60, Series 6â in the lower pole of the left kidney. The left kidney otherwise appears unremarkable with no cysts masses or hydronephrosis identified. The ureters cannot be followed all the way from the kidneys to the bladder however no  definitive calcifications are identified to suggest ureteral stones.  Aorta: There is moderate calcification of the abdominal aorta and its branches.  IVC: Unremarkable.  Bowel:  Esophagus: The visualized esophagus appears unremarkable.  Stomach: The stomach is nondistended and otherwise appears unremarkable.  Duodenum: Unremarkable appearing duodenum.  Small Bowel: The small bowel appears unremarkable.  Colon: There is moderate stool in the colon which could reflect an element of constipation.  Appendix: The appendix is not identified but no inflammatory changes are seen in the right lower quadrant to suggest appendicitis.  Peritoneum: No intraperitoneal free air or ascites is seen.    Pelvis:  Bladder: The bladder appears unremarkable.  Female:  Uterus: The uterus is not identified.  Ovaries: The ovaries are not identified. No adnexal masses are seen.    Bony structures:  Dorsal Spine: There is mild spondylosis of the visualized dorsal spine.  Bony Pelvis: The visualized bony structures of the pelvis appear unremarkable.      Impression:  1. No acute intraabdominal or pelvic solid organ or bowel pathology identified. Details and findings as discussed.                          Preliminary result by Interface, Rad Results In (12/10/22 02:03:28)                   Narrative:    START OF REPORT:  Technique: CT of the abdomen and pelvis was performed with axial images as well as sagittal and coronal reconstruction images with intravenous contrast.    Comparison: Comparison is with study dated â2022-03-23 17:12:26â.    Clinical History: Upper abdominal pain.    Dosage Information: Automated Exposure Control was utilized 565.34 mGy.cm.    Findings:  Lines and Tubes: None.  Thorax:  Lungs: There is minimal nonspecific dependent change at the lung bases. The previously seen 3 mm right basal subpleural nodule, now measures 4.4 mm and is otherwise unchanged.  Pleura: No effusions or thickening are seen. No pneumothorax is  seen in the visualized lung bases.  Heart: The heart size is within normal limits.  Abdomen:  Abdominal Wall: There is a subtle uncomplicated umbilical hernia which contains mesenteric fat.  Liver: There is mild intrahepatic biliary duct dilatation in this patient status post cholecystectomy. The liver otherwise appears unremarkable.  Biliary System: No extrahepatic biliary duct dilatation is seen.  Gallbladder: Surgical clips are seen in the gallbladder fossa consistent with prior cholecystectomy.  Pancreas: The pancreas appears unremarkable.  Spleen: The spleen appears unremarkable.  Adrenals: The adrenal glands appear unremarkable.  Kidneys: The right kidney appears unremarkable with no stones cysts masses or hydronephrosis. Left kidney. A single stone measuring â3.1 mmâ is seen on âImage 60, Series 6â in the lower pole of the left kidney. The left kidney otherwise appears unremarkable with no cysts masses or hydronephrosis identified. The ureters cannot be followed all the way from the kidneys to the bladder however no definitive calcifications are identified to suggest ureteral stones.  Aorta: There is moderate calcification of the abdominal aorta and its branches.  IVC: Unremarkable.  Bowel:  Esophagus: The visualized esophagus appears unremarkable.  Stomach: The stomach is nondistended and otherwise appears unremarkable.  Duodenum: Unremarkable appearing duodenum.  Small Bowel: The small bowel appears unremarkable.  Colon: There is moderate stool in the colon which could reflect an element of constipation.  Appendix: The appendix is not identified but no inflammatory changes are seen in the right lower quadrant to suggest appendicitis.  Peritoneum: No intraperitoneal free air or ascites is seen.    Pelvis:  Bladder: The bladder appears unremarkable.  Female:  Uterus: The uterus is not identified.  Ovaries: The ovaries are not identified. No adnexal masses are seen.    Bony structures:  Dorsal Spine:  There is mild spondylosis of the visualized dorsal spine.  Bony Pelvis: The visualized bony structures of the pelvis appear unremarkable.      Impression:  1. No acute intraabdominal or pelvic solid organ or bowel pathology identified. Details and findings as discussed.                                         Medications   sodium chloride 0.9% bolus 1,000 mL (0 mLs Intravenous Stopped 12/10/22 0226)   ondansetron injection 4 mg (4 mg Intravenous Given 12/10/22 0125)   iopamidoL (ISOVUE-370) injection 100 mL (100 mLs Intravenous Given 12/10/22 0102)     Medical Decision Making:   Initial Assessment:   Epigastric tenderness  Differential Diagnosis:   AAA, ACS, esophogeal rupture, mesenteric ischemia, intraabdominal abcess, retroperitoneal abcess, appendicitis, biliary disease, diverticulitis, gastritis, gastroenteritis, hepatitis, hernia, pancreatitis, inflammatory bowel disease, PUD, SBP, nephrolithiasis, DKA, constipation, GERD, IBS    Clinical Tests:   Lab Tests: Ordered and Reviewed  The following lab test(s) were unremarkable: CBC       <> Summary of Lab: Elevated BUN/creatinine ratio, mildly elevated liver enzymes, elevated lipase  Radiological Study: Ordered and Reviewed  ED Management:  IV fluids and Zofran, denies need for pain medicine at this time   Lipase elevated as well as mildly elevated liver enzymes  CT scan obtained unremarkable  See ED course   Other:   I have discussed this case with another health care provider.        Scribe Attestation:   Scribe #1: I performed the above scribed service and the documentation accurately describes the services I performed. I attest to the accuracy of the note.    Attending Attestation:           Physician Attestation for Scribe:  Physician Attestation Statement for Scribe #1: I, Cierra Marcano, , reviewed documentation, as scribed by Geoavny Ybarra in my presence, and it is both accurate and complete.           ED Course as of 12/10/22 0416   Sat Dec 10, 2022    0311 Discussed results with patient- will admit for bowel rest, IV, pain control and to ensure lab liver function improves  [KM]   0312 Paging Dr Zavala  [KM]   0332 Spoke with Dr Canchola for admission  [KM]      ED Course User Index  [KM] Cierra Marcano MD                 Clinical Impression:   Final diagnoses:  [R10.9] Abdominal pain  [K85.90] Acute pancreatitis without infection or necrosis, unspecified pancreatitis type (Primary)  [R74.01] Transaminitis        ED Disposition Condition    Admit Stable                Cierra Marcano MD  12/10/22 1228

## 2022-12-10 NOTE — SUBJECTIVE & OBJECTIVE
Past Medical History:   Diagnosis Date    Acid reflux     Angina pectoris     Anxiety and depression     Blockage of coronary artery of heart     CAD (coronary artery disease)     Claustrophobia     Claustrophobia     Mild    HLD (hyperlipidemia)     HTN (hypertension)     Kidney stones     Obesity, unspecified     CROW (obstructive sleep apnea)     PAD (peripheral artery disease)     Pain in right arm     Pancreatitis     Pancreatitis     Peripheral arterial disease     Personal history of colonic polyps 10/12/2018    Dr. Bonilla Zavala    Pseudoaneurysm     SOB (shortness of breath)     TIA (transient ischemic attack)     Weakness of right hand        Past Surgical History:   Procedure Laterality Date    APPENDECTOMY  1977    Application of short arm splint      Carotid Angiogram  08/24/2022    COLONOSCOPY W/ POLYPECTOMY  10/12/2018    Dr. Bonilla Zavala    CORONARY STENT PLACEMENT N/A     circumflex    Cyst of breast  2002    Dilation of Coronary Artery       ECTOPIC PREGNANCY SURGERY      Excision of Left Greater Saphenous  N/A     Fluroscopy of Left Heart Using Low Osmolar Contrast  N/A     Immobilzation of Right Lower Arm Using Splint      LAPAROSCOPIC CHOLECYSTECTOMY N/A     LAPAROSCOPIC TOTAL HYSTERECTOMY  07/26/1998    Laroscopic  N/A     Percutaneous Transluminal N/A     Resection of Gallbladder      TONSILLECTOMY  1962    TRANSCAROTID ARTERY REVASCULARIZATION (TCAR) Right        Review of patient's allergies indicates:   Allergen Reactions    Penicillins      Other reaction(s): was uanable to arouse       No current facility-administered medications on file prior to encounter.     Current Outpatient Medications on File Prior to Encounter   Medication Sig    aspirin (ECOTRIN) 81 MG EC tablet aspirin 81 mg tablet,delayed release   Take 1 tablet every day by oral route.    atorvastatin (LIPITOR) 40 MG tablet Take 40 mg by mouth once daily.    busPIRone (BUSPAR) 5 MG Tab Take 1 tablet (5 mg total) by mouth 2  (two) times daily.    cyclobenzaprine (FLEXERIL) 10 MG tablet cyclobenzaprine 10 mg tablet   TAKE 1 TABLET BY MOUTH 3 TIMES A DAY AS NEEDED FOR SPASM    EScitalopram oxalate (LEXAPRO) 20 MG tablet Take 0.5 tablets (10 mg total) by mouth once daily.    ezetimibe (ZETIA) 10 mg tablet ezetimibe 10 mg tablet    ferrous sulfate (FEOSOL) 325 mg (65 mg iron) Tab tablet once daily.    fluticasone propionate (FLONASE) 50 mcg/actuation nasal spray 1 spray (50 mcg total) by Each Nostril route 2 (two) times a day.    furosemide (LASIX) 40 MG tablet Take 1 tablet (40 mg total) by mouth once daily. (Patient not taking: Reported on 10/20/2022)    HYDROcodone-acetaminophen (NORCO) 5-325 mg per tablet Take 1 tablet by mouth every 6 (six) hours as needed for Pain. (Patient not taking: Reported on 10/20/2022)    nitroGLYCERIN (NITROSTAT) 0.4 MG SL tablet nitroglycerin 0.4 mg sublingual tablet   TAKE 1 TABLET UNDER THE TONGUE ONCE A DAY AS NEEDED FOR CHEST PAIN    ondansetron (ZOFRAN) 4 MG tablet ondansetron HCl 4 mg tablet   TAKE 1 TABLET BY MOUTH EVERY 8 HOURS AS NEEDED FOR NAUSEA AND VOMITING    ranolazine (RANEXA) 1,000 mg Tb12 ranolazine ER 1,000 mg tablet,extended release,12 hr   TAKE 1 TABLET BY MOUTH TWICE A DAY    ticagrelor (BRILINTA) 90 mg tablet Brilinta 90 mg tablet     Family History       Problem Relation (Age of Onset)    Breast cancer Mother    Cancer Mother    Coronary artery disease Father, Brother    Hypertension Mother, Father, Sister, Brother    Lung cancer Mother    Stroke Sister          Tobacco Use    Smoking status: Former     Types: Cigarettes     Quit date: 2015     Years since quittin.4    Smokeless tobacco: Never    Tobacco comments:     Quit 2015   Substance and Sexual Activity    Alcohol use: Not Currently    Drug use: Never    Sexual activity: Not Currently     Review of Systems   Constitutional:  Negative for fever.   HENT: Negative.     Eyes: Negative.    Respiratory: Negative.      Cardiovascular:  Positive for chest pain.   Gastrointestinal:  Positive for abdominal pain, constipation and nausea. Negative for diarrhea and vomiting.   Endocrine: Positive for cold intolerance.   Genitourinary: Negative.    Musculoskeletal:  Positive for back pain.   Skin: Negative.    Neurological:  Positive for headaches.   Psychiatric/Behavioral:  The patient is nervous/anxious.    Objective:     Vital Signs (Most Recent):  Temp: 97.7 °F (36.5 °C) (12/09/22 2124)  Pulse: 70 (12/10/22 0704)  Resp: 16 (12/10/22 0704)  BP: (!) 154/93 (12/10/22 0704)  SpO2: 97 % (12/10/22 0704)   Vital Signs (24h Range):  Temp:  [97.7 °F (36.5 °C)] 97.7 °F (36.5 °C)  Pulse:  [69-70] 70  Resp:  [15-22] 16  SpO2:  [94 %-98 %] 97 %  BP: (147-165)/(68-93) 154/93     Weight: 82.9 kg (182 lb 12.2 oz)  Body mass index is 29.37 kg/m².    Physical Exam  Constitutional:       General: She is not in acute distress.     Appearance: Normal appearance.   HENT:      Head: Normocephalic and atraumatic.   Eyes:      General: No scleral icterus.     Conjunctiva/sclera: Conjunctivae normal.   Neck:      Vascular: No carotid bruit.   Cardiovascular:      Rate and Rhythm: Normal rate and regular rhythm.      Pulses: Normal pulses.      Heart sounds: Normal heart sounds. No murmur heard.    No friction rub. No gallop.   Pulmonary:      Effort: Pulmonary effort is normal.      Breath sounds: Normal breath sounds.   Abdominal:      General: Bowel sounds are normal.      Palpations: Abdomen is soft. There is no mass.      Tenderness: There is abdominal tenderness (mild ttp without any rebound or guarding). There is no guarding or rebound.   Musculoskeletal:         General: No deformity.      Cervical back: No rigidity or tenderness.      Right lower leg: No edema.      Left lower leg: No edema.   Lymphadenopathy:      Cervical: No cervical adenopathy.   Skin:     Coloration: Skin is not jaundiced or pale.      Findings: No erythema.   Neurological:       General: No focal deficit present.      Mental Status: She is alert and oriented to person, place, and time.      Gait: Gait normal.   Psychiatric:         Mood and Affect: Mood normal.         Behavior: Behavior normal.         Thought Content: Thought content normal.         Judgment: Judgment normal.           Significant Labs: All pertinent labs within the past 24 hours have been reviewed.  Recent Lab Results         12/10/22  0130   12/09/22  2234        Albumin/Globulin Ratio   1.5       Albumin   3.9       Alkaline Phosphatase   167       ALT   74       Appearance, UA Cloudy         AST   123       Bacteria, UA None Seen         Baso #   0.02       Basophil %   0.2       BILIRUBIN TOTAL   0.8       Bilirubin, UA Negative         BUN   23.2       Calcium   9.7       Chloride   103       CO2   27       Color, UA Dark Yellow         Creatinine   0.81       eGFR   >60       Eos #   0.07       Eosinophil %   0.6       Globulin, Total   2.6       Glucose   107       Glucose, UA Negative         HEMATOCRIT   35.9       HEMOGLOBIN   11.5       Immature Grans (Abs)   0.05       Immature Granulocytes   0.4       Ketones, UA Trace         Leukocytes, UA 1+         Lipase   827       Lymph #   1.06       LYMPH %   8.9       MCH   31.1       MCHC   32.0       MCV   97.0       Mono #   0.99       Mono %   8.3       MPV   10.2       Neut #   9.7       Neut %   81.6       NITRITE UA Negative         nRBC   0.0       Occult Blood UA Negative         pH, UA 5.5         Platelets   325       Potassium   3.8       PROTEIN TOTAL   6.5       Protein, UA 1+         RBC   3.70       RBC, UA 0-2         RDW   12.9       Sodium   139       Specific Gravity,UA 1.030         Squam Epithel, UA <5         Urobilinogen, UA 1.0         WBC, UA 5         WBC   11.9               Significant Imaging: I have reviewed all pertinent imaging results/findings within the past 24 hours.  CT: I have reviewed all pertinent results/findings within the  past 24 hours and my personal findings are: pulmonary nodule that has grown from 3 to 4.4 mm.  But no other acute concerning pathology.  EKG shows no concerning st or t wave changes.

## 2022-12-11 VITALS
SYSTOLIC BLOOD PRESSURE: 110 MMHG | TEMPERATURE: 98 F | BODY MASS INDEX: 29.37 KG/M2 | HEIGHT: 66 IN | WEIGHT: 182.75 LBS | OXYGEN SATURATION: 96 % | HEART RATE: 65 BPM | DIASTOLIC BLOOD PRESSURE: 66 MMHG | RESPIRATION RATE: 15 BRPM

## 2022-12-11 PROBLEM — M54.9 BACK PAIN: Status: ACTIVE | Noted: 2022-12-11

## 2022-12-11 LAB
ALBUMIN SERPL-MCNC: 3.4 GM/DL (ref 3.4–4.8)
ALBUMIN/GLOB SERPL: 1.3 RATIO (ref 1.1–2)
ALP SERPL-CCNC: 131 UNIT/L (ref 40–150)
ALT SERPL-CCNC: 43 UNIT/L (ref 0–55)
AST SERPL-CCNC: 39 UNIT/L (ref 5–34)
BASOPHILS # BLD AUTO: 0.03 X10(3)/MCL (ref 0–0.2)
BASOPHILS NFR BLD AUTO: 0.7 %
BILIRUBIN DIRECT+TOT PNL SERPL-MCNC: 0.5 MG/DL
BUN SERPL-MCNC: 6.4 MG/DL (ref 9.8–20.1)
CALCIUM SERPL-MCNC: 9.3 MG/DL (ref 8.4–10.2)
CHLORIDE SERPL-SCNC: 108 MMOL/L (ref 98–107)
CO2 SERPL-SCNC: 24 MMOL/L (ref 23–31)
CREAT SERPL-MCNC: 0.68 MG/DL (ref 0.55–1.02)
EOSINOPHIL # BLD AUTO: 0.12 X10(3)/MCL (ref 0–0.9)
EOSINOPHIL NFR BLD AUTO: 3 %
ERYTHROCYTE [DISTWIDTH] IN BLOOD BY AUTOMATED COUNT: 12.9 % (ref 11.5–17)
GFR SERPLBLD CREATININE-BSD FMLA CKD-EPI: >60 MLS/MIN/1.73/M2
GLOBULIN SER-MCNC: 2.7 GM/DL (ref 2.4–3.5)
GLUCOSE SERPL-MCNC: 88 MG/DL (ref 82–115)
HCT VFR BLD AUTO: 35 % (ref 37–47)
HGB BLD-MCNC: 11.2 GM/DL (ref 12–16)
IMM GRANULOCYTES # BLD AUTO: 0.01 X10(3)/MCL (ref 0–0.04)
IMM GRANULOCYTES NFR BLD AUTO: 0.2 %
LIPASE SERPL-CCNC: 24 U/L
LYMPHOCYTES # BLD AUTO: 1.07 X10(3)/MCL (ref 0.6–4.6)
LYMPHOCYTES NFR BLD AUTO: 26.7 %
MCH RBC QN AUTO: 30.9 PG (ref 27–31)
MCHC RBC AUTO-ENTMCNC: 32 MG/DL (ref 33–36)
MCV RBC AUTO: 96.7 FL (ref 80–94)
MONOCYTES # BLD AUTO: 0.4 X10(3)/MCL (ref 0.1–1.3)
MONOCYTES NFR BLD AUTO: 10 %
NEUTROPHILS # BLD AUTO: 2.4 X10(3)/MCL (ref 2.1–9.2)
NEUTROPHILS NFR BLD AUTO: 59.4 %
NRBC BLD AUTO-RTO: 0 %
PLATELET # BLD AUTO: 302 X10(3)/MCL (ref 130–400)
PMV BLD AUTO: 10.3 FL (ref 7.4–10.4)
POTASSIUM SERPL-SCNC: 3.6 MMOL/L (ref 3.5–5.1)
PROT SERPL-MCNC: 6.1 GM/DL (ref 5.8–7.6)
RBC # BLD AUTO: 3.62 X10(6)/MCL (ref 4.2–5.4)
SODIUM SERPL-SCNC: 139 MMOL/L (ref 136–145)
TROPONIN I SERPL-MCNC: <0.01 NG/ML (ref 0–0.04)
WBC # SPEC AUTO: 4 X10(3)/MCL (ref 4.5–11.5)

## 2022-12-11 PROCEDURE — 84484 ASSAY OF TROPONIN QUANT: CPT | Performed by: INTERNAL MEDICINE

## 2022-12-11 PROCEDURE — 80053 COMPREHEN METABOLIC PANEL: CPT | Performed by: EMERGENCY MEDICINE

## 2022-12-11 PROCEDURE — 36415 COLL VENOUS BLD VENIPUNCTURE: CPT | Performed by: EMERGENCY MEDICINE

## 2022-12-11 PROCEDURE — 85025 COMPLETE CBC W/AUTO DIFF WBC: CPT | Performed by: EMERGENCY MEDICINE

## 2022-12-11 PROCEDURE — 99232 SBSQ HOSP IP/OBS MODERATE 35: CPT | Mod: ,,, | Performed by: INTERNAL MEDICINE

## 2022-12-11 PROCEDURE — 25000003 PHARM REV CODE 250: Performed by: EMERGENCY MEDICINE

## 2022-12-11 PROCEDURE — 25000003 PHARM REV CODE 250: Performed by: INTERNAL MEDICINE

## 2022-12-11 PROCEDURE — 99232 PR SUBSEQUENT HOSPITAL CARE,LEVL II: ICD-10-PCS | Mod: ,,, | Performed by: INTERNAL MEDICINE

## 2022-12-11 PROCEDURE — 83690 ASSAY OF LIPASE: CPT | Performed by: EMERGENCY MEDICINE

## 2022-12-11 RX ORDER — MELOXICAM 7.5 MG/1
15 TABLET ORAL DAILY
Status: DISCONTINUED | OUTPATIENT
Start: 2022-12-11 | End: 2022-12-11

## 2022-12-11 RX ADMIN — FAMOTIDINE 20 MG: 20 TABLET, FILM COATED ORAL at 08:12

## 2022-12-11 RX ADMIN — BUSPIRONE HYDROCHLORIDE 5 MG: 5 TABLET ORAL at 08:12

## 2022-12-11 RX ADMIN — ESCITALOPRAM OXALATE 10 MG: 10 TABLET ORAL at 08:12

## 2022-12-11 RX ADMIN — ATORVASTATIN CALCIUM 40 MG: 40 TABLET, FILM COATED ORAL at 08:12

## 2022-12-11 RX ADMIN — EZETIMIBE 10 MG: 10 TABLET ORAL at 08:12

## 2022-12-11 RX ADMIN — HYDROCODONE BITARTRATE AND ACETAMINOPHEN 1 TABLET: 5; 325 TABLET ORAL at 08:12

## 2022-12-11 RX ADMIN — ASPIRIN 81 MG: 81 TABLET, COATED ORAL at 08:12

## 2022-12-11 RX ADMIN — POLYETHYLENE GLYCOL 3350 17 G: 17 POWDER, FOR SOLUTION ORAL at 08:12

## 2022-12-11 RX ADMIN — TICAGRELOR 90 MG: 90 TABLET ORAL at 08:12

## 2022-12-11 NOTE — ASSESSMENT & PLAN NOTE
Seems more musculoskeletal.  NSAIDS relatively contraindicated because she is on Brilinta.  She has norco prn.

## 2022-12-11 NOTE — ASSESSMENT & PLAN NOTE
She's off meds.  BP has been a little high.  But she is in pain and very anxiousl. Cont to monitor.

## 2022-12-11 NOTE — PROGRESS NOTES
[facility] Hospital Medicine  Progress Note    Patient Name: Tiny Silverman  MRN: [id number]  Patient Class: IP- Inpatient   Admission Date: 12/9/2022  Length of Stay: 1 days  Attending Physician: Bonilla Robles, *  Primary Care Provider: Bonilla Robles MD        Subjective:     Principal Problem:Acute pancreatitis        HPI:  Ms. Silverman is a 62 year old female with CAD, PAD, a history of pancreatitis in 2020, HLD, anxiety and depression along with other medical diagnoses who presented to the ER last night with a 3 day history of progressive back pain.  It started with soreness in her back a few days ago that was worse with walking.  It seemed somewhat improved with tylenol, and she assumed it was musculoskeletal.  But over the past few days, the pain worsened, traveled up to her mid back and around to her chest.  The pain was severe and prompted her to come in for further evaluation.  It was limiting her walking and reaching.  She wasn't having any significant abdominal pain until yesterday.  And she does feel some nausea, but there hasn't been any vomiting.  Her last BM was this morning and was kind of hard.  She had pancreatitis in 2020 when she had to have her gall bladder removed.  She did start taking glucosamine chondroitin last week.  The pain did improve some since she's been here overnight, but its starting to get worse again this am.  She does report that she is hungry.      Overview/Hospital Course:  No notes on file    Interval History: She is having some lower back pain -- more in the left SI region.  She isn't having abdominal pain.  But she is having dry heaves.  No vomiting.    Review of Systems  Objective:     Vital Signs (Most Recent):  Temp: 97.8 °F (36.6 °C) (12/11/22 0742)  Pulse: 69 (12/11/22 0742)  Resp: 18 (12/11/22 0804)  BP: (!) 145/75 (12/11/22 0742)  SpO2: 95 % (12/11/22 0742)   Vital Signs (24h Range):  Temp:  [97.5 °F (36.4 °C)-98 °F  (36.7 °C)] 97.8 °F (36.6 °C)  Pulse:  [66-78] 69  Resp:  [15-20] 18  SpO2:  [91 %-99 %] 95 %  BP: (132-160)/(66-93) 145/75     Weight: 82.9 kg (182 lb 12.2 oz)  Body mass index is 29.37 kg/m².  No intake or output data in the 24 hours ending 12/11/22 0940   Physical Exam  Vitals reviewed.   Constitutional:       General: She is not in acute distress.     Appearance: Normal appearance. She is not ill-appearing or diaphoretic.   HENT:      Head: Normocephalic and atraumatic.   Pulmonary:      Effort: Pulmonary effort is normal.   Abdominal:      General: Abdomen is flat. There is no distension.      Palpations: Abdomen is soft.      Tenderness: There is no guarding or rebound.   Musculoskeletal:      Comments: Some ttp in the Left SI region.  No flank pain.  No vertrebral point ttp.   Skin:     General: Skin is warm and dry.   Neurological:      General: No focal deficit present.      Mental Status: She is alert.   Psychiatric:         Mood and Affect: Mood normal.         Behavior: Behavior normal.         Thought Content: Thought content normal.         Judgment: Judgment normal.       Significant Labs: All pertinent labs within the past 24 hours have been reviewed.  Labs pending    Significant Imaging: I have reviewed all pertinent imaging results/findings within the past 24 hours.  CT: I have reviewed all pertinent results/findings within the past 24 hours and my personal findings are:         Assessment/Plan:      * Acute pancreatitis  Labs this morning pending.  If they are better, will advance diet.  If they are worse, consider GI consultation.      Back pain  Seems more musculoskeletal.  NSAIDS relatively contraindicated because she is on Brilinta.  She has norco prn.      CAD (coronary artery disease)  EKG without any acute changes.      PAD (peripheral artery disease)  She is s/p recent carotid stent.      HTN (hypertension)  She's off meds.  BP has been a little high.  But she is in pain and very anxiousl.  Cont to monitor.    Anxiety and depression  Stable, cont lexapro.          VTE Risk Mitigation (From admission, onward)         Ordered     IP VTE LOW RISK PATIENT  Once         12/10/22 0930     Place sequential compression device  Until discontinued         12/10/22 0930                Discharge Planning   YUMIKO: 12/11/2022     Code Status: Full Code   Is the patient medically ready for discharge?:     Reason for patient still in hospital (select all that apply): Patient trending condition                     Court Canchola MD  Department of Hospital Medicine   [facility]

## 2022-12-11 NOTE — ASSESSMENT & PLAN NOTE
Labs this morning pending.  If they are better, will advance diet.  If they are worse, consider GI consultation.

## 2022-12-13 NOTE — HOSPITAL COURSE
Mrs. Silverman was still having some nausea and back pain requiring norco on day 2 of her hospital stay.  At the time that I saw the patient we discussed the plan that if her lipase was lower, we would try advancing her diet.  Her lipase was indeed lower, and I did indeed advance her diet.  A few hours later, I received a call from the nurse that the patient had removed her own IV and left.  She told the nurse that I had said she could leave, which is untrue.  The patient had already left the floor by the time I received the call.

## 2022-12-13 NOTE — DISCHARGE SUMMARY
[facility] Medicine  Discharge Summary      Patient Name: Tiny Silverman  MRN: [id number]  [facility]: [id number]  Patient Class: IP- Inpatient  Admission Date: 12/9/2022  Hospital Length of Stay: 1 days  Discharge Date and Time: 12/11/2022 12:10 PM -- patient left AMA  Attending Physician: Brooklynn att. providers found   Discharging Provider: Court Canchola MD  Primary Care Provider: Bonilla Robles MD    Primary Care Team: Networked reference to record PCT     HPI:   Ms. Silverman is a 62 year old female with CAD, PAD, a history of pancreatitis in 2020, HLD, anxiety and depression along with other medical diagnoses who presented to the ER last night with a 3 day history of progressive back pain.  It started with soreness in her back a few days ago that was worse with walking.  It seemed somewhat improved with tylenol, and she assumed it was musculoskeletal.  But over the past few days, the pain worsened, traveled up to her mid back and around to her chest.  The pain was severe and prompted her to come in for further evaluation.  It was limiting her walking and reaching.  She wasn't having any significant abdominal pain until yesterday.  And she does feel some nausea, but there hasn't been any vomiting.  Her last BM was this morning and was kind of hard.  She had pancreatitis in 2020 when she had to have her gall bladder removed.  She did start taking glucosamine chondroitin last week.  The pain did improve some since she's been here overnight, but its starting to get worse again this am.  She does report that she is hungry.      * No surgery found *      Hospital Course:   Mrs. Silverman was still having some nausea and back pain requiring norco on day 2 of her hospital stay.  At the time that I saw the patient we discussed the plan that if her lipase was lower, we would try advancing her diet.  Her lipase was indeed lower, and I did indeed advance her diet.  A few hours later, I  received a call from the nurse that the patient had removed her own IV and left.  She told the nurse that I had said she could leave, which is untrue.  The patient had already left the floor by the time I received the call.       Goals of Care Treatment Preferences:  Code Status: Full Code      Consults:     No new Assessment & Plan notes have been filed under this hospital service since the last note was generated.  Service: Hospital Medicine    Final Active Diagnoses:    Diagnosis Date Noted POA    PRINCIPAL PROBLEM:  Acute pancreatitis [K85.90] 12/10/2022 Unknown    Back pain [M54.9] 12/11/2022 Yes    HTN (hypertension) [I10] 12/10/2022 Yes    HLD (hyperlipidemia) [E78.5] 12/10/2022 Yes    PAD (peripheral artery disease) [I73.9] 12/10/2022 Yes    CAD (coronary artery disease) [I25.10] 12/10/2022 Yes    Anxiety and depression [F41.9, F32.A] 10/12/2022 Yes      Problems Resolved During this Admission:       Discharged Condition: good    Disposition: Left Without Being Seen    Follow Up:    Patient Instructions:   No discharge procedures on file.    Significant Diagnostic Studies: See CT abd/pel    Pending Diagnostic Studies:     None         Medications:  None  Med rec was not completed because the patient was never discharged.  Indwelling Lines/Drains at time of discharge:   Lines/Drains/Airways     None                 Time spent on the discharge of patient: 0 minutes         Court Canchola MD  Department of Hospital Medicine  [facility]

## 2023-01-29 ENCOUNTER — HOSPITAL ENCOUNTER (EMERGENCY)
Facility: HOSPITAL | Age: 63
Discharge: HOME | End: 2023-01-29
Attending: STUDENT IN AN ORGANIZED HEALTH CARE EDUCATION/TRAINING PROGRAM
Payer: COMMERCIAL

## 2023-01-29 VITALS
TEMPERATURE: 98 F | RESPIRATION RATE: 18 BRPM | HEIGHT: 65 IN | WEIGHT: 187 LBS | DIASTOLIC BLOOD PRESSURE: 60 MMHG | SYSTOLIC BLOOD PRESSURE: 112 MMHG | BODY MASS INDEX: 31.16 KG/M2 | HEART RATE: 70 BPM | OXYGEN SATURATION: 100 %

## 2023-01-29 DIAGNOSIS — N39.0 URINARY TRACT INFECTION WITHOUT HEMATURIA, SITE UNSPECIFIED: ICD-10-CM

## 2023-01-29 DIAGNOSIS — M54.50 ACUTE BILATERAL LOW BACK PAIN WITHOUT SCIATICA: Primary | ICD-10-CM

## 2023-01-29 LAB
APPEARANCE UR: CLEAR
BACTERIA #/AREA URNS AUTO: ABNORMAL /HPF
BILIRUB UR QL STRIP.AUTO: NEGATIVE MG/DL
CAOX CRY URNS QL MICRO: ABNORMAL /HPF
COLOR UR AUTO: ABNORMAL
GLUCOSE UR QL STRIP.AUTO: NEGATIVE MG/DL
HYALINE CASTS URNS QL MICRO: ABNORMAL /LPF
KETONES UR QL STRIP.AUTO: ABNORMAL MG/DL
LEUKOCYTE ESTERASE UR QL STRIP.AUTO: ABNORMAL UNIT/L
MUCOUS THREADS URNS QL MICRO: ABNORMAL /LPF
NITRITE UR QL STRIP.AUTO: NEGATIVE
PH UR STRIP.AUTO: 6 [PH]
PROT UR QL STRIP.AUTO: ABNORMAL MG/DL
RBC #/AREA URNS AUTO: ABNORMAL /HPF
RBC UR QL AUTO: NEGATIVE UNIT/L
SP GR UR STRIP.AUTO: 1.02 (ref 1–1.03)
SQUAMOUS #/AREA URNS AUTO: ABNORMAL /HPF
UROBILINOGEN UR STRIP-ACNC: 1 MG/DL
WBC #/AREA URNS AUTO: ABNORMAL /HPF

## 2023-01-29 PROCEDURE — 96372 THER/PROPH/DIAG INJ SC/IM: CPT | Performed by: PHYSICIAN ASSISTANT

## 2023-01-29 PROCEDURE — 63600175 PHARM REV CODE 636 W HCPCS: Performed by: PHYSICIAN ASSISTANT

## 2023-01-29 PROCEDURE — 81001 URINALYSIS AUTO W/SCOPE: CPT | Performed by: PHYSICIAN ASSISTANT

## 2023-01-29 PROCEDURE — 99284 EMERGENCY DEPT VISIT MOD MDM: CPT

## 2023-01-29 RX ORDER — CEFDINIR 300 MG/1
300 CAPSULE ORAL 2 TIMES DAILY
Qty: 20 CAPSULE | Refills: 0 | OUTPATIENT
Start: 2023-01-29 | End: 2023-01-29 | Stop reason: SDUPTHER

## 2023-01-29 RX ORDER — HYDROCODONE BITARTRATE AND ACETAMINOPHEN 7.5; 325 MG/1; MG/1
1 TABLET ORAL EVERY 6 HOURS PRN
Qty: 16 TABLET | Refills: 0 | Status: SHIPPED | OUTPATIENT
Start: 2023-01-29 | End: 2023-03-16

## 2023-01-29 RX ORDER — CEFDINIR 300 MG/1
300 CAPSULE ORAL 2 TIMES DAILY
Qty: 20 CAPSULE | Refills: 0 | OUTPATIENT
Start: 2023-01-29 | End: 2023-02-08

## 2023-01-29 RX ORDER — ORPHENADRINE CITRATE 100 MG/1
100 TABLET, EXTENDED RELEASE ORAL 2 TIMES DAILY
Qty: 20 TABLET | Refills: 0 | Status: SHIPPED | OUTPATIENT
Start: 2023-01-29 | End: 2023-02-08

## 2023-01-29 RX ORDER — ORPHENADRINE CITRATE 100 MG/1
100 TABLET, EXTENDED RELEASE ORAL 2 TIMES DAILY
Qty: 20 TABLET | Refills: 0 | OUTPATIENT
Start: 2023-01-29 | End: 2023-01-29 | Stop reason: SDUPTHER

## 2023-01-29 RX ORDER — ORPHENADRINE CITRATE 30 MG/ML
60 INJECTION INTRAMUSCULAR; INTRAVENOUS
Status: COMPLETED | OUTPATIENT
Start: 2023-01-29 | End: 2023-01-29

## 2023-01-29 RX ORDER — HYDROCODONE BITARTRATE AND ACETAMINOPHEN 7.5; 325 MG/1; MG/1
1 TABLET ORAL EVERY 6 HOURS PRN
Qty: 16 TABLET | Refills: 0 | Status: SHIPPED | OUTPATIENT
Start: 2023-01-29 | End: 2023-01-29 | Stop reason: SDUPTHER

## 2023-01-29 RX ORDER — KETOROLAC TROMETHAMINE 30 MG/ML
30 INJECTION, SOLUTION INTRAMUSCULAR; INTRAVENOUS
Status: COMPLETED | OUTPATIENT
Start: 2023-01-29 | End: 2023-01-29

## 2023-01-29 RX ADMIN — ORPHENADRINE CITRATE 60 MG: 30 INJECTION INTRAMUSCULAR; INTRAVENOUS at 03:01

## 2023-01-29 RX ADMIN — KETOROLAC TROMETHAMINE 30 MG: 30 INJECTION, SOLUTION INTRAMUSCULAR at 03:01

## 2023-01-29 NOTE — ED PROVIDER NOTES
Encounter Date: 1/29/2023       History     Chief Complaint   Patient presents with    Back Pain     C/o lower back pain that began on Thursday. Exacerbated by movement. Denies urinary symptoms and injury. Patient is ambulatory into triage. No OTC medication today.      See MDM    The history is provided by the patient. No  was used.   Review of patient's allergies indicates:   Allergen Reactions    Penicillins      Other reaction(s): was uanable to arouse     Past Medical History:   Diagnosis Date    Acid reflux     Angina pectoris     Anxiety and depression     Blockage of coronary artery of heart     CAD (coronary artery disease)     Claustrophobia     Claustrophobia     Mild    HLD (hyperlipidemia)     HTN (hypertension)     Kidney stones     Obesity, unspecified     CROW (obstructive sleep apnea)     PAD (peripheral artery disease)     Pain in right arm     Pancreatitis     Pancreatitis     Peripheral arterial disease     Personal history of colonic polyps 10/12/2018    Dr. Bonilla Zavala    Pseudoaneurysm     SOB (shortness of breath)     TIA (transient ischemic attack)     Weakness of right hand      Past Surgical History:   Procedure Laterality Date    APPENDECTOMY  1977    Application of short arm splint      Carotid Angiogram  08/24/2022    COLONOSCOPY W/ POLYPECTOMY  10/12/2018    Dr. Bonilla Zavala    CORONARY STENT PLACEMENT N/A     circumflex    Cyst of breast  2002    Dilation of Coronary Artery       ECTOPIC PREGNANCY SURGERY      Excision of Left Greater Saphenous  N/A     Fluroscopy of Left Heart Using Low Osmolar Contrast  N/A     Immobilzation of Right Lower Arm Using Splint      LAPAROSCOPIC CHOLECYSTECTOMY N/A     LAPAROSCOPIC TOTAL HYSTERECTOMY  07/26/1998    Laroscopic  N/A     Percutaneous Transluminal N/A     Resection of Gallbladder      TONSILLECTOMY  1962    TRANSCAROTID ARTERY REVASCULARIZATION (TCAR) Right      Family History   Problem Relation Age of Onset    Cancer  Mother     Hypertension Mother     Breast cancer Mother     Lung cancer Mother     Hypertension Father     Coronary artery disease Father     Stroke Sister     Hypertension Sister     Hypertension Brother     Coronary artery disease Brother         CABG     Social History     Tobacco Use    Smoking status: Former     Types: Cigarettes     Quit date: 2015     Years since quittin.5    Smokeless tobacco: Never    Tobacco comments:     Quit 2015   Substance Use Topics    Alcohol use: Not Currently    Drug use: Never     Review of Systems   Constitutional:  Negative for fever.   Respiratory:  Negative for cough and shortness of breath.    Cardiovascular:  Negative for chest pain.   Gastrointestinal:  Negative for abdominal pain.   Genitourinary:  Negative for difficulty urinating and dysuria.   Musculoskeletal:  Positive for back pain. Negative for gait problem.   Skin:  Negative for color change.   Neurological:  Negative for dizziness, speech difficulty and headaches.   Psychiatric/Behavioral:  Negative for hallucinations and suicidal ideas.    All other systems reviewed and are negative.    Physical Exam     Initial Vitals [23 1455]   BP Pulse Resp Temp SpO2   113/69 78 18 98.2 °F (36.8 °C) 96 %      MAP       --         Physical Exam    Nursing note and vitals reviewed.  Constitutional: She appears well-developed and well-nourished.   HENT:   Head: Normocephalic.   Eyes: EOM are normal.   Neck:   Normal range of motion.  Cardiovascular:  Normal rate, regular rhythm, normal heart sounds and intact distal pulses.           Pulmonary/Chest: Breath sounds normal. No respiratory distress.   Abdominal: Abdomen is soft. Bowel sounds are normal. There is no abdominal tenderness.   Musculoskeletal:         General: Normal range of motion.      Cervical back: Normal range of motion.      Comments: Tenderness bilateral lower back.  Good strength bilateral legs.  Good pulses equal bilateral lower extremities.   No abdominal bruits, thrills, or heaves.     Neurological: She is alert and oriented to person, place, and time. She has normal strength.   Skin: Skin is warm and dry.   Psychiatric: She has a normal mood and affect. Her behavior is normal. Judgment and thought content normal.       ED Course   Procedures  Labs Reviewed   URINALYSIS, REFLEX TO URINE CULTURE - Abnormal; Notable for the following components:       Result Value    Protein, UA Trace (*)     Ketones, UA Trace (*)     Leukocyte Esterase, UA 1+ (*)     All other components within normal limits   URINALYSIS, MICROSCOPIC - Abnormal; Notable for the following components:    WBC, UA 6-10 (*)     Hyaline Casts, UA Occasional (*)     Mucous, UA Small (*)     Calcium Oxalate Crystals, UA Moderate (*)     All other components within normal limits          Imaging Results              X-Ray Lumbar Spine Ap And Lateral (Final result)  Result time 01/29/23 15:26:11      Final result by Derek Sloan MD (01/29/23 15:26:11)                   Impression:      Mild degenerative disc disease and mild to moderate spondylosis.      Electronically signed by: Derek Sloan  Date:    01/29/2023  Time:    15:26               Narrative:    EXAMINATION:  XR LUMBAR SPINE AP AND LATERAL    CLINICAL HISTORY:  low back pain;    TECHNIQUE:  Two-view    COMPARISON:  None available    FINDINGS:  There is trace of lumbar dextroscoliotic curvature.  Lumbar vertebrae stature and alignment is preserved.  There are mild intervertebral disc space degenerative changes throughout.  There is lumbar facet arthropathy which is relatively more pronounced at the level of L5-S1.  No acute fracture or malalignment identified.  Transverse processes are not well seen with overlying stool and bowel gas.    Right upper quadrant metallic clips.  Calcified plaques of the abdominal aorta and the iliac arteries.  Left iliac stent graft.                                       Medications   ketorolac injection  30 mg (30 mg Intramuscular Given 1/29/23 1500)   orphenadrine injection 60 mg (60 mg Intramuscular Given 1/29/23 1500)     Medical Decision Making:   Initial Assessment:   Historian:  Patient.  Patient is a 62-year-old female  that presents with bilateral lower back pain that has been present few days. Associated symptoms nothing. Surrounding information is nothing. Exacerbated by movement. Relieved by nothing. Patient treatment prior to arrival none. Risk factors include none. Other history pertaining to this complaint nothing.   Assessment:  See physical exam.    Differential Diagnosis:   Low back strain, low back sprain, herniated disc, kidney stone, UTI, AAA  ED Management:  No medical or surgical consult for indicated.  No social determinants that affect her healthcare.  History was obtained.  Physical was performed.  After reviewing urinalysis will place patient on cefdinir.  It appears she has musculoskeletal back pain.  We will place her on Norco and Norflex.  Dispo diagnosis low back pain and UTI.  Dispo medications cefdinir, Norflex, and Norco.   Additional MDM:   Lab:  Urine does show some white     X-Rays: I have independently interpreted X-Ray(s) - see notes. L-spine x-ray no acute findings                      Clinical Impression:   Final diagnoses:  [M54.50] Acute bilateral low back pain without sciatica (Primary)  [N39.0] Urinary tract infection without hematuria, site unspecified        ED Disposition Condition    Discharge Stable          ED Prescriptions       Medication Sig Dispense Start Date End Date Auth. Provider    orphenadrine (NORFLEX) 100 mg tablet  (Status: Discontinued) Take 1 tablet (100 mg total) by mouth 2 (two) times daily. for 10 days 20 tablet 1/29/2023 1/29/2023 JAZLYN Fletcher    HYDROcodone-acetaminophen (NORCO) 7.5-325 mg per tablet  (Status: Discontinued) Take 1 tablet by mouth every 6 (six) hours as needed for Pain. 16 tablet 1/29/2023 1/29/2023 JAZLYN Fletcher     cefdinir (OMNICEF) 300 MG capsule  (Status: Discontinued) Take 1 capsule (300 mg total) by mouth 2 (two) times daily. for 10 days 20 capsule 1/29/2023 1/29/2023 JAZLYN Fletcher    cefdinir (OMNICEF) 300 MG capsule Take 1 capsule (300 mg total) by mouth 2 (two) times daily. for 10 days 20 capsule 1/29/2023 2/8/2023 JAZLYN Fletcher    HYDROcodone-acetaminophen (NORCO) 7.5-325 mg per tablet Take 1 tablet by mouth every 6 (six) hours as needed for Pain. 16 tablet 1/29/2023 -- JAZLYN Fletcher    orphenadrine (NORFLEX) 100 mg tablet Take 1 tablet (100 mg total) by mouth 2 (two) times daily. for 10 days 20 tablet 1/29/2023 2/8/2023 JAZLYN Fletcher          Follow-up Information       Follow up With Specialties Details Why Contact Info    Your Primary Care Provider  Call in 3 days               JAZLYN Fletcher  01/29/23 [id number]

## 2023-01-29 NOTE — FIRST PROVIDER EVALUATION
"Medical screening examination initiated.  I have conducted a focused provider triage encounter, findings are as follows:    Brief history of present illness:  63 yo female presents to ED for evaluation of low back pain worsening over the past several days. Complains of pain worse with movement.     Vitals:    01/29/23 1455   BP: 113/69   BP Location: Left arm   Patient Position: Sitting   Pulse: 78   Resp: 18   Temp: 98.2 °F (36.8 °C)   SpO2: 96%   Weight: 84.8 kg (187 lb)   Height: 5' 5" (1.651 m)       Pertinent physical exam:  Patient is awake and alert and oriented.  Ambulatory to triage.  In no acute distress.      Brief workup plan:  UA, XR lumbar    Preliminary workup initiated; this workup will be continued and followed by the physician or advanced practice provider that is assigned to the patient when roomed.  "

## 2023-02-07 ENCOUNTER — HOSPITAL ENCOUNTER (OUTPATIENT)
Facility: HOSPITAL | Age: 63
Discharge: HOME | End: 2023-02-08
Attending: EMERGENCY MEDICINE | Admitting: INTERNAL MEDICINE
Payer: COMMERCIAL

## 2023-02-07 DIAGNOSIS — M54.9 DORSALGIA, UNSPECIFIED: ICD-10-CM

## 2023-02-07 DIAGNOSIS — R20.2 PARESTHESIAS: Primary | ICD-10-CM

## 2023-02-07 DIAGNOSIS — R07.9 CHEST PAIN: ICD-10-CM

## 2023-02-07 DIAGNOSIS — D64.9 ANEMIA, UNSPECIFIED TYPE: ICD-10-CM

## 2023-02-07 LAB
ALBUMIN SERPL-MCNC: 3.6 G/DL (ref 3.4–4.8)
ALBUMIN/GLOB SERPL: 1.2 RATIO (ref 1.1–2)
ALP SERPL-CCNC: 101 UNIT/L (ref 40–150)
ALT SERPL-CCNC: 16 UNIT/L (ref 0–55)
APPEARANCE UR: ABNORMAL
AST SERPL-CCNC: 20 UNIT/L (ref 5–34)
BACTERIA #/AREA URNS AUTO: ABNORMAL /HPF
BASOPHILS # BLD AUTO: 0.03 X10(3)/MCL (ref 0–0.2)
BASOPHILS NFR BLD AUTO: 0.5 %
BILIRUB UR QL STRIP.AUTO: ABNORMAL MG/DL
BILIRUBIN DIRECT+TOT PNL SERPL-MCNC: 0.3 MG/DL
BUN SERPL-MCNC: 24 MG/DL (ref 9.8–20.1)
CALCIUM SERPL-MCNC: 9.5 MG/DL (ref 8.4–10.2)
CAOX CRY URNS QL MICRO: ABNORMAL /HPF
CHLORIDE SERPL-SCNC: 105 MMOL/L (ref 98–107)
CO2 SERPL-SCNC: 22 MMOL/L (ref 23–31)
COLOR UR AUTO: ABNORMAL
CREAT SERPL-MCNC: 0.83 MG/DL (ref 0.55–1.02)
EOSINOPHIL # BLD AUTO: 0.1 X10(3)/MCL (ref 0–0.9)
EOSINOPHIL NFR BLD AUTO: 1.6 %
ERYTHROCYTE [DISTWIDTH] IN BLOOD BY AUTOMATED COUNT: 14.7 % (ref 11.5–17)
GFR SERPLBLD CREATININE-BSD FMLA CKD-EPI: >60 MLS/MIN/1.73/M2
GLOBULIN SER-MCNC: 3 GM/DL (ref 2.4–3.5)
GLUCOSE SERPL-MCNC: 100 MG/DL (ref 82–115)
GLUCOSE UR QL STRIP.AUTO: NEGATIVE MG/DL
HCT VFR BLD AUTO: 28.5 % (ref 37–47)
HGB BLD-MCNC: 8.8 GM/DL (ref 12–16)
IMM GRANULOCYTES # BLD AUTO: 0.02 X10(3)/MCL (ref 0–0.04)
IMM GRANULOCYTES NFR BLD AUTO: 0.3 %
KETONES UR QL STRIP.AUTO: ABNORMAL MG/DL
LEUKOCYTE ESTERASE UR QL STRIP.AUTO: ABNORMAL UNIT/L
LYMPHOCYTES # BLD AUTO: 1.37 X10(3)/MCL (ref 0.6–4.6)
LYMPHOCYTES NFR BLD AUTO: 21.9 %
MCH RBC QN AUTO: 26.9 PG
MCHC RBC AUTO-ENTMCNC: 30.9 MG/DL (ref 33–36)
MCV RBC AUTO: 87.2 FL (ref 80–94)
MONOCYTES # BLD AUTO: 0.55 X10(3)/MCL (ref 0.1–1.3)
MONOCYTES NFR BLD AUTO: 8.8 %
MUCOUS THREADS URNS QL MICRO: ABNORMAL /LPF
NEUTROPHILS # BLD AUTO: 4.19 X10(3)/MCL (ref 2.1–9.2)
NEUTROPHILS NFR BLD AUTO: 66.9 %
NITRITE UR QL STRIP.AUTO: NEGATIVE
NRBC BLD AUTO-RTO: 0 %
PH UR STRIP.AUTO: 5.5 [PH]
PLATELET # BLD AUTO: 344 X10(3)/MCL (ref 130–400)
PMV BLD AUTO: 10 FL (ref 7.4–10.4)
POCT GLUCOSE: 101 MG/DL (ref 70–110)
POTASSIUM SERPL-SCNC: 4.3 MMOL/L (ref 3.5–5.1)
PROT SERPL-MCNC: 6.6 GM/DL (ref 5.8–7.6)
PROT UR QL STRIP.AUTO: ABNORMAL MG/DL
RBC # BLD AUTO: 3.27 X10(6)/MCL (ref 4.2–5.4)
RBC #/AREA URNS AUTO: ABNORMAL /HPF
RBC UR QL AUTO: NEGATIVE UNIT/L
SODIUM SERPL-SCNC: 136 MMOL/L (ref 136–145)
SP GR UR STRIP.AUTO: 1.03 (ref 1–1.03)
SQUAMOUS #/AREA URNS AUTO: ABNORMAL /HPF
TROPONIN I SERPL-MCNC: <0.01 NG/ML (ref 0–0.04)
UROBILINOGEN UR STRIP-ACNC: 1 MG/DL
WBC # SPEC AUTO: 6.3 X10(3)/MCL (ref 4.5–11.5)
WBC #/AREA URNS AUTO: ABNORMAL /HPF

## 2023-02-07 PROCEDURE — 84484 ASSAY OF TROPONIN QUANT: CPT | Performed by: PHYSICIAN ASSISTANT

## 2023-02-07 PROCEDURE — 81003 URINALYSIS AUTO W/O SCOPE: CPT | Performed by: PHYSICIAN ASSISTANT

## 2023-02-07 PROCEDURE — 99285 EMERGENCY DEPT VISIT HI MDM: CPT | Mod: 25

## 2023-02-07 PROCEDURE — 82962 GLUCOSE BLOOD TEST: CPT

## 2023-02-07 PROCEDURE — 93005 ELECTROCARDIOGRAM TRACING: CPT

## 2023-02-07 PROCEDURE — 80053 COMPREHEN METABOLIC PANEL: CPT | Performed by: PHYSICIAN ASSISTANT

## 2023-02-07 PROCEDURE — 85025 COMPLETE CBC W/AUTO DIFF WBC: CPT | Performed by: PHYSICIAN ASSISTANT

## 2023-02-07 PROCEDURE — 93010 ELECTROCARDIOGRAM REPORT: CPT | Mod: ,,, | Performed by: INTERNAL MEDICINE

## 2023-02-07 PROCEDURE — 93010 EKG 12-LEAD: ICD-10-PCS | Mod: ,,, | Performed by: INTERNAL MEDICINE

## 2023-02-07 NOTE — Clinical Note
Diagnosis: Paresthesias [646139]   Future Attending Provider: MILLA LEE [[id number]]   Admitting Provider:: MILLA LEE [[id number]]   Special Needs:: No Special Needs [1]

## 2023-02-07 NOTE — FIRST PROVIDER EVALUATION
"Medical screening examination initiated.  I have conducted a focused provider triage encounter, findings are as follows:    Brief history of present illness:  Pt with hx of anxiety, PAD, TIA c/o numbness and tingling to arms and legs presents to ER for evalaution.    Vitals:    02/07/23 1325 02/07/23 1330   BP: 127/71    Pulse: 71    Resp: 18    Temp:  98.1 °F (36.7 °C)   TempSrc:  Oral   SpO2: 98%    Weight: 84.8 kg (187 lb)    Height: 5' 5" (1.651 m)        Pertinent physical exam:  AAOx4 ambulatory symmetrical female    Brief workup plan:  labs, imaging    Preliminary workup initiated; this workup will be continued and followed by the physician or advanced practice provider that is assigned to the patient when roomed.  "

## 2023-02-08 VITALS
WEIGHT: 187 LBS | HEIGHT: 65 IN | HEART RATE: 70 BPM | RESPIRATION RATE: 27 BRPM | OXYGEN SATURATION: 96 % | SYSTOLIC BLOOD PRESSURE: 156 MMHG | DIASTOLIC BLOOD PRESSURE: 69 MMHG | TEMPERATURE: 98 F | BODY MASS INDEX: 31.16 KG/M2

## 2023-02-08 DIAGNOSIS — M54.16 LUMBAR BACK PAIN WITH RADICULOPATHY AFFECTING LEFT LOWER EXTREMITY: Primary | ICD-10-CM

## 2023-02-08 PROBLEM — R20.2 PARESTHESIAS: Status: ACTIVE | Noted: 2023-02-08

## 2023-02-08 PROBLEM — I65.21 SYMPTOMATIC CAROTID ARTERY STENOSIS WITHOUT INFARCTION, RIGHT: Status: RESOLVED | Noted: 2022-10-05 | Resolved: 2023-02-08

## 2023-02-08 LAB
CK SERPL-CCNC: 17 U/L (ref 29–168)
FERRITIN SERPL-MCNC: 7.41 NG/ML (ref 4.63–204)
FOLATE SERPL-MCNC: 16.3 NG/ML (ref 7–31.4)
HCT VFR BLD AUTO: 28.7 % (ref 37–47)
HCT VFR BLD AUTO: 29.3 % (ref 37–47)
HGB BLD-MCNC: 8.9 GM/DL (ref 12–16)
HGB BLD-MCNC: 9.2 GM/DL (ref 12–16)
IRON SATN MFR SERPL: 6 % (ref 20–50)
IRON SERPL-MCNC: 18 UG/DL (ref 50–170)
TIBC SERPL-MCNC: 302 UG/DL (ref 70–310)
TIBC SERPL-MCNC: 320 UG/DL (ref 250–450)
TRANSFERRIN SERPL-MCNC: 302 MG/DL (ref 173–360)
VIT B12 SERPL-MCNC: 662 PG/ML (ref 213–816)

## 2023-02-08 PROCEDURE — 96365 THER/PROPH/DIAG IV INF INIT: CPT

## 2023-02-08 PROCEDURE — 84466 ASSAY OF TRANSFERRIN: CPT | Performed by: INTERNAL MEDICINE

## 2023-02-08 PROCEDURE — 85014 HEMATOCRIT: CPT | Performed by: NURSE PRACTITIONER

## 2023-02-08 PROCEDURE — 83550 IRON BINDING TEST: CPT | Performed by: NURSE PRACTITIONER

## 2023-02-08 PROCEDURE — 63600175 PHARM REV CODE 636 W HCPCS: Performed by: INTERNAL MEDICINE

## 2023-02-08 PROCEDURE — 99223 1ST HOSP IP/OBS HIGH 75: CPT | Mod: ,,, | Performed by: INTERNAL MEDICINE

## 2023-02-08 PROCEDURE — G0378 HOSPITAL OBSERVATION PER HR: HCPCS

## 2023-02-08 PROCEDURE — 99223 PR INITIAL HOSPITAL CARE,LEVL III: ICD-10-PCS | Mod: ,,, | Performed by: INTERNAL MEDICINE

## 2023-02-08 PROCEDURE — 82746 ASSAY OF FOLIC ACID SERUM: CPT | Performed by: INTERNAL MEDICINE

## 2023-02-08 PROCEDURE — 25000003 PHARM REV CODE 250: Performed by: INTERNAL MEDICINE

## 2023-02-08 PROCEDURE — 25000003 PHARM REV CODE 250: Performed by: NURSE PRACTITIONER

## 2023-02-08 PROCEDURE — 82607 VITAMIN B-12: CPT | Performed by: INTERNAL MEDICINE

## 2023-02-08 PROCEDURE — 82550 ASSAY OF CK (CPK): CPT | Performed by: NURSE PRACTITIONER

## 2023-02-08 PROCEDURE — 82728 ASSAY OF FERRITIN: CPT | Performed by: INTERNAL MEDICINE

## 2023-02-08 RX ORDER — LANOLIN ALCOHOL/MO/W.PET/CERES
1 CREAM (GRAM) TOPICAL DAILY
Status: DISCONTINUED | OUTPATIENT
Start: 2023-02-08 | End: 2023-02-08 | Stop reason: HOSPADM

## 2023-02-08 RX ORDER — ATORVASTATIN CALCIUM 40 MG/1
40 TABLET, FILM COATED ORAL DAILY
Status: DISCONTINUED | OUTPATIENT
Start: 2023-02-08 | End: 2023-02-08 | Stop reason: HOSPADM

## 2023-02-08 RX ORDER — ACETAMINOPHEN 325 MG/1
650 TABLET ORAL EVERY 8 HOURS PRN
Status: DISCONTINUED | OUTPATIENT
Start: 2023-02-08 | End: 2023-02-08 | Stop reason: HOSPADM

## 2023-02-08 RX ORDER — EZETIMIBE 10 MG/1
10 TABLET ORAL DAILY
Status: DISCONTINUED | OUTPATIENT
Start: 2023-02-08 | End: 2023-02-08 | Stop reason: HOSPADM

## 2023-02-08 RX ORDER — HYDROCODONE BITARTRATE AND ACETAMINOPHEN 7.5; 325 MG/1; MG/1
1 TABLET ORAL EVERY 6 HOURS PRN
Status: DISCONTINUED | OUTPATIENT
Start: 2023-02-08 | End: 2023-02-08 | Stop reason: HOSPADM

## 2023-02-08 RX ORDER — ASPIRIN 81 MG/1
81 TABLET ORAL DAILY
Status: DISCONTINUED | OUTPATIENT
Start: 2023-02-08 | End: 2023-02-08 | Stop reason: HOSPADM

## 2023-02-08 RX ORDER — ESCITALOPRAM OXALATE 10 MG/1
10 TABLET ORAL DAILY
Status: DISCONTINUED | OUTPATIENT
Start: 2023-02-08 | End: 2023-02-08 | Stop reason: HOSPADM

## 2023-02-08 RX ORDER — BUSPIRONE HYDROCHLORIDE 5 MG/1
5 TABLET ORAL 2 TIMES DAILY
Status: DISCONTINUED | OUTPATIENT
Start: 2023-02-08 | End: 2023-02-08 | Stop reason: HOSPADM

## 2023-02-08 RX ORDER — TALC
6 POWDER (GRAM) TOPICAL NIGHTLY PRN
Status: DISCONTINUED | OUTPATIENT
Start: 2023-02-08 | End: 2023-02-08 | Stop reason: HOSPADM

## 2023-02-08 RX ORDER — DIAZEPAM 5 MG/1
5 TABLET ORAL
Status: DISCONTINUED | OUTPATIENT
Start: 2023-02-08 | End: 2023-02-08 | Stop reason: HOSPADM

## 2023-02-08 RX ORDER — RANOLAZINE 500 MG/1
1000 TABLET, EXTENDED RELEASE ORAL 2 TIMES DAILY
Status: DISCONTINUED | OUTPATIENT
Start: 2023-02-08 | End: 2023-02-08 | Stop reason: HOSPADM

## 2023-02-08 RX ORDER — ONDANSETRON 4 MG/1
8 TABLET, ORALLY DISINTEGRATING ORAL EVERY 8 HOURS PRN
Status: DISCONTINUED | OUTPATIENT
Start: 2023-02-08 | End: 2023-02-08 | Stop reason: HOSPADM

## 2023-02-08 RX ORDER — SODIUM CHLORIDE 0.9 % (FLUSH) 0.9 %
10 SYRINGE (ML) INJECTION
Status: DISCONTINUED | OUTPATIENT
Start: 2023-02-08 | End: 2023-02-08 | Stop reason: HOSPADM

## 2023-02-08 RX ADMIN — FERROUS SULFATE TAB 325 MG (65 MG ELEMENTAL FE) 1 EACH: 325 (65 FE) TAB at 09:02

## 2023-02-08 RX ADMIN — DIAZEPAM 5 MG: 5 TABLET ORAL at 03:02

## 2023-02-08 RX ADMIN — SODIUM CHLORIDE 125 MG: 9 INJECTION, SOLUTION INTRAVENOUS at 09:02

## 2023-02-08 RX ADMIN — ATORVASTATIN CALCIUM 40 MG: 40 TABLET, FILM COATED ORAL at 09:02

## 2023-02-08 RX ADMIN — RANOLAZINE 1000 MG: 500 TABLET, EXTENDED RELEASE ORAL at 09:02

## 2023-02-08 RX ADMIN — BUSPIRONE HYDROCHLORIDE 5 MG: 5 TABLET ORAL at 09:02

## 2023-02-08 RX ADMIN — ESCITALOPRAM OXALATE 10 MG: 10 TABLET ORAL at 09:02

## 2023-02-08 RX ADMIN — ONDANSETRON 8 MG: 4 TABLET, ORALLY DISINTEGRATING ORAL at 02:02

## 2023-02-08 RX ADMIN — ACETAMINOPHEN 650 MG: 325 TABLET ORAL at 02:02

## 2023-02-08 RX ADMIN — EZETIMIBE 10 MG: 10 TABLET ORAL at 09:02

## 2023-02-08 RX ADMIN — ASPIRIN 81 MG: 81 TABLET, COATED ORAL at 09:02

## 2023-02-08 NOTE — ED PROVIDER NOTES
Encounter Date: 2/7/2023       History     Chief Complaint   Patient presents with    Numbness     C/o blurred vision & numbness/tingling to all extremities that started at 1230 today. No weakness, facial droop or slurred speech noted in triage. . Ambulatory in triage. Pt seen in ED last week for back pain & UTI.     See MDM    The history is provided by the patient. No  was used.   Review of patient's allergies indicates:   Allergen Reactions    Penicillins      Other reaction(s): was uanable to arouse     Past Medical History:   Diagnosis Date    Acid reflux     Angina pectoris     Anxiety and depression     Blockage of coronary artery of heart     CAD (coronary artery disease)     Claustrophobia     Claustrophobia     Mild    HLD (hyperlipidemia)     HTN (hypertension)     Kidney stones     Obesity, unspecified     CROW (obstructive sleep apnea)     PAD (peripheral artery disease)     Pain in right arm     Pancreatitis     Pancreatitis     Peripheral arterial disease     Personal history of colonic polyps 10/12/2018    Dr. Bonilla Zavala    Pseudoaneurysm     SOB (shortness of breath)     TIA (transient ischemic attack)     Weakness of right hand      Past Surgical History:   Procedure Laterality Date    APPENDECTOMY  1977    Application of short arm splint      Carotid Angiogram  08/24/2022    COLONOSCOPY W/ POLYPECTOMY  10/12/2018    Dr. Bonilla Zavala    CORONARY STENT PLACEMENT N/A     circumflex    Cyst of breast  2002    Dilation of Coronary Artery       ECTOPIC PREGNANCY SURGERY      Excision of Left Greater Saphenous  N/A     Fluroscopy of Left Heart Using Low Osmolar Contrast  N/A     Immobilzation of Right Lower Arm Using Splint      LAPAROSCOPIC CHOLECYSTECTOMY N/A     LAPAROSCOPIC TOTAL HYSTERECTOMY  07/26/1998    Laroscopic  N/A     Percutaneous Transluminal N/A     Resection of Gallbladder      TONSILLECTOMY  1962    TRANSCAROTID ARTERY REVASCULARIZATION (TCAR) Right       Family History   Problem Relation Age of Onset    Cancer Mother     Hypertension Mother     Breast cancer Mother     Lung cancer Mother     Hypertension Father     Coronary artery disease Father     Stroke Sister     Hypertension Sister     Hypertension Brother     Coronary artery disease Brother         CABG     Social History     Tobacco Use    Smoking status: Former     Types: Cigarettes     Quit date: 2015     Years since quittin.6    Smokeless tobacco: Never    Tobacco comments:     Quit 2015   Substance Use Topics    Alcohol use: Not Currently    Drug use: Never     Review of Systems   Constitutional:  Negative for fever.   Respiratory:  Negative for cough and shortness of breath.    Cardiovascular:  Negative for chest pain.   Gastrointestinal:  Negative for abdominal pain.   Genitourinary:  Negative for difficulty urinating and dysuria.   Musculoskeletal:  Negative for gait problem.   Skin:  Negative for color change.   Neurological:  Positive for numbness. Negative for dizziness, speech difficulty and headaches.   Psychiatric/Behavioral:  Negative for hallucinations and suicidal ideas.    All other systems reviewed and are negative.    Physical Exam     Initial Vitals   BP Pulse Resp Temp SpO2   23 1325 23 1325 23 1325 23 1330 23 1325   127/71 71 18 98.1 °F (36.7 °C) 98 %      MAP       --                Physical Exam    Nursing note and vitals reviewed.  Constitutional: She appears well-developed and well-nourished.   HENT:   Head: Normocephalic.   Eyes: EOM are normal.   Neck:   Normal range of motion.  Cardiovascular:  Normal rate, regular rhythm, normal heart sounds and intact distal pulses.           Pulmonary/Chest: Breath sounds normal. No respiratory distress.   Abdominal: Abdomen is soft. Bowel sounds are normal. There is no abdominal tenderness.   Musculoskeletal:         General: Normal range of motion.      Cervical back: Normal range of motion.      Neurological: She is alert and oriented to person, place, and time. She has normal strength.   Skin: Skin is warm and dry.   Psychiatric: She has a normal mood and affect. Her behavior is normal. Judgment and thought content normal.       ED Course   Procedures  Labs Reviewed   COMPREHENSIVE METABOLIC PANEL - Abnormal; Notable for the following components:       Result Value    Carbon Dioxide 22 (*)     Blood Urea Nitrogen 24.0 (*)     All other components within normal limits   URINALYSIS, REFLEX TO URINE CULTURE - Abnormal; Notable for the following components:    Appearance, UA Cloudy (*)     Protein, UA Trace (*)     Ketones, UA Trace (*)     Bilirubin, UA 1+ (*)     Leukocyte Esterase, UA Trace (*)     All other components within normal limits   CBC WITH DIFFERENTIAL - Abnormal; Notable for the following components:    RBC 3.27 (*)     Hgb 8.8 (*)     Hct 28.5 (*)     MCHC 30.9 (*)     All other components within normal limits   URINALYSIS, MICROSCOPIC - Abnormal; Notable for the following components:    Mucous, UA Small (*)     Calcium Oxalate Crystals, UA Many (*)     All other components within normal limits   TROPONIN I - Normal   CBC W/ AUTO DIFFERENTIAL    Narrative:     The following orders were created for panel order CBC auto differential.  Procedure                               Abnormality         Status                     ---------                               -----------         ------                     CBC with Differential[[id number]]        Abnormal            Final result                 Please view results for these tests on the individual orders.   POCT GLUCOSE, HAND-HELD DEVICE   POCT GLUCOSE     EKG Readings: (Independently Interpreted)   Rhythm: Normal Sinus Rhythm. Heart Rate: 65. Ectopy: No Ectopy. Conduction: Normal. ST Segments: Normal ST Segments. T Waves: Normal. Axis: Normal. Clinical Impression: Normal Sinus Rhythm     Imaging Results              CT Head Without Contrast  (Final result)  Result time 02/07/23 14:15:18      Final result by Keerthi Rose MD (02/07/23 14:15:18)                   Impression:      No acute abnormality seen      Electronically signed by: Keerthi Rose  Date:    02/07/2023  Time:    14:15               Narrative:    EXAMINATION:  CT HEAD WITHOUT CONTRAST    CLINICAL HISTORY:  Neuro deficit, acute, stroke suspected;    TECHNIQUE:  Multiple axial images were obtained from the base of the brain to the vertex without contrast administration.  Sagittal and coronal reconstructions were performed. .Automatic exposure control  (AEC) is utilized to reduce patient radiation exposure.    COMPARISON:  02/04/2022    FINDINGS:  There is no intracranial mass or lesion seen.  No hemorrhage is seen.  No infarct is seen.  The ventricles and basilar cisterns appear normal.  Brain parenchyma appears grossly unremarkable.    Posterior fossa appears normal.  The calvarium is intact.  The paranasal sinuses appear grossly unremarkable.                                       Medications - No data to display  Medical Decision Making:   Initial Assessment:   Historian:  Patient and daughter.  Patient is a 62-year-old female  that presents with numbness and tingling all extremities that has been present today. Associated symptoms dizziness and blurred vision. Surrounding information is nothing. Exacerbated by nothing. Relieved by nothing. Patient treatment prior to arrival none. Risk factors include multiple stents. Other history pertaining to this complaint nothing.   Assessment:  See physical exam.    Differential Diagnosis:   CVA, TIA, anemia  ED Management:  Reviewed previous records in on 11/18/2022 patient was seen by Cardiology and 10/20/2022 patient was seen by Neurology.  History was obtained.  Physical was performed.  After reviewing patient's labs and CT scan it was noted that she had a drop in her hemoglobin hematocrit.  I did consult Internal Medicine   [facility] and spoke to Dr. Whyte who was on-call for her.  I discussed all these findings with him and he would like her admitted for observation and check H&Hs q.6 hours.  Diagnosis:  Anemia, paresthesia.  Disposition: Observation.  No surgical consult were indicated.  No social determinants that would affect her healthcare were noted.  Additional MDM:     EKG: I have independently interpreted EKG(s) - see notes.   Lab: I have independently reviewed the lab and note no significant abnormalities.  Patient was slightly anemic.  Her H&H is down from 12/11/2022.      X-Rays: CT head with no acute findings                      Clinical Impression:   Final diagnoses:  [R07.9] Chest pain  [R20.2] Paresthesias (Primary)  [D64.9] Anemia, unspecified type        ED Disposition Condition    Observation Stable                JAZLYN Fletcher  02/08/23 0022

## 2023-02-08 NOTE — H&P
[facility] Medicine History & Physical Examination       Patient: Tiny Silverman  MRN: [id number]  STATUS: OP- Observation   DOS: 2/8/2023   PCP: Bonilla Robles MD      CC: paresthesias, generalized. No weakness. Left sided back pain with radiculopathy       HISTORY OF PRESENT ILLNESS   62-year-old  female with significant past medical history presented with complaints of paresthesias kind of generalized around the mouth to the fingertips.  She also complains of lumbar back pain with associated left-sided radiculopathy.  Had hospital in December for presumed acute pancreatitis, had back pain at that visit as well.  She presented back to the ER on 01/29/2023 with lower back pain was diagnosed with urinary tract infection and sent home with prescription for Omnicef, hydrocodone and Norflex.  Over the course of the past several days she states the back pain and left-sided radicular symptoms are minimally improved she states she has trouble even lifting her left leg in the bed.  She is ambulating without assistance and denies any weakness in that leg.  She denies any saddle paresthesias.  She does have some generalized paresthesias kind of localized she was worked up for acute CVA, patient with absolutely no focal deficits on exam noted to have H&H of 8.8 and 28.5, iron saturation noted to be 6% she is on dual antiplatelet therapy for strong history of vascular disease with several stents.  On the morning following admission she reports that she is feeling okay she has no blurry vision, trouble with her speech no leg weakness.  Just a paresthesias.  Plan for MRI of lumbar spine without contrast this morning, iron infusion ordered.  Patient is anxious to go home.    CT of the head with no abnormalities    History of COVID 19 infection 1/2021 with monoclonal antibody on 1-  History of anxiety, severe coronary artery disease status post previous stent  placements x7 , peripheral arterial disease, hypertension, hyperlipidemia,  elective CABG per Dr. Flores. Reported attempted CAB ×1 with left lower extremity endoscopic venous harvesting but unsuccessful; underwent transmyocardial laser revascularization.    History of subdural hematoma, monitored by trauma service as well as neurosurgery.  Dr. Olivo-cardiology; carotid disease  Colonoscopy in October 2018 by Dr. Byrnes, repeat in 5 years.    REVIEW OF SYSTEMS   Except as documented, all other systems reviewed and negative       PAST MEDICAL HISTORY      Past Medical History:   Diagnosis Date    Acid reflux     Angina pectoris     Anxiety and depression     Blockage of coronary artery of heart     CAD (coronary artery disease)     Claustrophobia     Claustrophobia     Mild    HLD (hyperlipidemia)     HTN (hypertension)     Kidney stones     Obesity, unspecified     CROW (obstructive sleep apnea)     PAD (peripheral artery disease)     Pain in right arm     Pancreatitis     Pancreatitis     Peripheral arterial disease     Personal history of colonic polyps 10/12/2018    Dr. Bonilla Zavala    Pseudoaneurysm     SOB (shortness of breath)     TIA (transient ischemic attack)     Weakness of right hand           PAST SURGICAL HISTORY     Past Surgical History:   Procedure Laterality Date    APPENDECTOMY  1977    Application of short arm splint      Carotid Angiogram  08/24/2022    COLONOSCOPY W/ POLYPECTOMY  10/12/2018    Dr. Bonilla Zavala    CORONARY STENT PLACEMENT N/A     circumflex    Cyst of breast  2002    Dilation of Coronary Artery       ECTOPIC PREGNANCY SURGERY      Excision of Left Greater Saphenous  N/A     Fluroscopy of Left Heart Using Low Osmolar Contrast  N/A     Immobilzation of Right Lower Arm Using Splint      LAPAROSCOPIC CHOLECYSTECTOMY N/A     LAPAROSCOPIC TOTAL HYSTERECTOMY  07/26/1998    Laroscopic  N/A     Percutaneous Transluminal N/A     Resection of Gallbladder      TONSILLECTOMY  1962     TRANSCAROTID ARTERY REVASCULARIZATION (TCAR) Right           FAMILY HISTORY   Reviewed, negative in relation to patient's current condition.  Family History   Problem Relation Age of Onset    Cancer Mother     Hypertension Mother     Breast cancer Mother     Lung cancer Mother     Hypertension Father     Coronary artery disease Father     Stroke Sister     Hypertension Sister     Hypertension Brother     Coronary artery disease Brother         CABG          SOCIAL HISTORY     Social History     Tobacco Use    Smoking status: Former     Types: Cigarettes     Quit date: 2015     Years since quittin.6    Smokeless tobacco: Never    Tobacco comments:     Quit 2015   Substance Use Topics    Alcohol use: Not Currently          ALLERGIES   Penicillins        HOME MEDICATIONS     Current Outpatient Medications   Medication Instructions    aspirin (ECOTRIN) 81 MG EC tablet aspirin 81 mg tablet,delayed release   Take 1 tablet every day by oral route.    atorvastatin (LIPITOR) 40 mg, Oral, Daily    busPIRone (BUSPAR) 5 mg, Oral, 2 times daily    cefdinir (OMNICEF) 300 mg, Oral, 2 times daily    cyclobenzaprine (FLEXERIL) 10 MG tablet cyclobenzaprine 10 mg tablet   TAKE 1 TABLET BY MOUTH 3 TIMES A DAY AS NEEDED FOR SPASM    EScitalopram oxalate (LEXAPRO) 10 mg, Oral, Daily    ezetimibe (ZETIA) 10 mg tablet ezetimibe 10 mg tablet    ferrous sulfate (FEOSOL) 325 mg (65 mg iron) Tab tablet Daily    fluticasone propionate (FLONASE) 50 mcg, Each Nostril, 2 times daily    furosemide (LASIX) 40 mg, Oral, Daily    HYDROcodone-acetaminophen (NORCO) 5-325 mg per tablet 1 tablet, Oral, Every 6 hours PRN    HYDROcodone-acetaminophen (NORCO) 7.5-325 mg per tablet 1 tablet, Oral, Every 6 hours PRN    nitroGLYCERIN (NITROSTAT) 0.4 MG SL tablet nitroglycerin 0.4 mg sublingual tablet   TAKE 1 TABLET UNDER THE TONGUE ONCE A DAY AS NEEDED FOR CHEST PAIN    ondansetron (ZOFRAN) 4 MG tablet ondansetron HCl 4 mg tablet   TAKE 1 TABLET  "BY MOUTH EVERY 8 HOURS AS NEEDED FOR NAUSEA AND VOMITING    orphenadrine (NORFLEX) 100 mg, Oral, 2 times daily    ranolazine (RANEXA) 1,000 mg Tb12 ranolazine ER 1,000 mg tablet,extended release,12 hr   TAKE 1 TABLET BY MOUTH TWICE A DAY    ticagrelor (BRILINTA) 90 mg tablet Brilinta 90 mg tablet          PHYSICAL EXAM   VITALS:  /61   Pulse 76   Temp 98.4 °F (36.9 °C) (Oral)   Resp (!) 29   Ht 5' 5" (1.651 m)   Wt 84.8 kg (187 lb)   SpO2 97%   BMI 31.12 kg/m²      GENERAL: Awake and in NAD  HEENT: NC/AT, EOMI, PERRL.  NECK: Supple,  No JVD  LUNGS: CTA B/L  CVS: RRR, S1S2 positive  GI/: Soft, NT/ND, bowel sounds positive.  EXTREMITIES: Peripheral pulses 2+, no peripheral edema  DERM: Warm, dry.  No rashes or lesions noted.  NEURO: AAOx3, no focal neurologic deficit  PSYCH: Cooperative, appropriate mood and affect       LABS     Admission on 02/07/2023   Component Date Value    Sodium Level 02/07/2023 136     Potassium Level 02/07/2023 4.3     Chloride 02/07/2023 105     Carbon Dioxide 02/07/2023 22 (L)     Glucose Level 02/07/2023 100     Blood Urea Nitrogen 02/07/2023 24.0 (H)     Creatinine 02/07/2023 0.83     Calcium Level Total 02/07/2023 9.5     Protein Total 02/07/2023 6.6     Albumin Level 02/07/2023 3.6     Globulin 02/07/2023 3.0     Albumin/Globulin Ratio 02/07/2023 1.2     Bilirubin Total 02/07/2023 0.3     Alkaline Phosphatase 02/07/2023 101     Alanine Aminotransferase 02/07/2023 16     Aspartate Aminotransfera* 02/07/2023 20     eGFR 02/07/2023 >60     Troponin-I 02/07/2023 <0.010     Color, UA 02/07/2023 Dark Yellow     Appearance, UA 02/07/2023 Cloudy (A)     Specific Viola, UA 02/07/2023 1.028     pH, UA 02/07/2023 5.5     Protein, UA 02/07/2023 Trace (A)     Glucose, UA 02/07/2023 Negative     Ketones, UA 02/07/2023 Trace (A)     Blood, UA 02/07/2023 Negative     Bilirubin, UA 02/07/2023 1+ (A)     Urobilinogen, UA 02/07/2023 1.0     Nitrites, UA 02/07/2023 Negative     Leukocyte " Esterase, UA 02/07/2023 Trace (A)     WBC 02/07/2023 6.3     RBC 02/07/2023 3.27 (L)     Hgb 02/07/2023 8.8 (L)     Hct 02/07/2023 28.5 (L)     MCV 02/07/2023 87.2     MCH 02/07/2023 26.9     MCHC 02/07/2023 30.9 (L)     RDW 02/07/2023 14.7     Platelet 02/07/2023 344     MPV 02/07/2023 10.0     Neut % 02/07/2023 66.9     Lymph % 02/07/2023 21.9     Mono % 02/07/2023 8.8     Eos % 02/07/2023 1.6     Basophil % 02/07/2023 0.5     Lymph # 02/07/2023 1.37     Neut # 02/07/2023 4.19     Mono # 02/07/2023 0.55     Eos # 02/07/2023 0.10     Baso # 02/07/2023 0.03     IG# 02/07/2023 0.02     IG% 02/07/2023 0.3     NRBC% 02/07/2023 0.0     POCT Glucose 02/07/2023 101     RBC, UA 02/07/2023 0-2     WBC, UA 02/07/2023 0-2     Squamous Epithelial Cell* 02/07/2023 0-4     Bacteria, UA 02/07/2023 None Seen     Mucous, UA 02/07/2023 Small (A)     Calcium Oxalate Crystals* 02/07/2023 Many (A)     Creatine Kinase 02/08/2023 17 (L)     Hgb 02/08/2023 9.2 (L)     Hct 02/08/2023 29.3 (L)     Iron Binding Capacity Un* 02/08/2023 302     Iron Level 02/08/2023 18 (L)     Iron Binding Capacity To* 02/08/2023 320     Iron Saturation 02/08/2023 6 (L)           DIAGNOSTICS   X-Ray Lumbar Spine Ap And Lateral    Result Date: 1/29/2023  EXAMINATION: XR LUMBAR SPINE AP AND LATERAL CLINICAL HISTORY: low back pain; TECHNIQUE: Two-view COMPARISON: None available FINDINGS: There is trace of lumbar dextroscoliotic curvature.  Lumbar vertebrae stature and alignment is preserved.  There are mild intervertebral disc space degenerative changes throughout.  There is lumbar facet arthropathy which is relatively more pronounced at the level of L5-S1.  No acute fracture or malalignment identified.  Transverse processes are not well seen with overlying stool and bowel gas. Right upper quadrant metallic clips.  Calcified plaques of the abdominal aorta and the iliac arteries.  Left iliac stent graft.     Mild degenerative disc disease and mild to moderate  spondylosis. Electronically signed by: Derek Sloan Date:    01/29/2023 Time:    15:26    CT Head Without Contrast    Result Date: 2/7/2023  EXAMINATION: CT HEAD WITHOUT CONTRAST CLINICAL HISTORY: Neuro deficit, acute, stroke suspected; TECHNIQUE: Multiple axial images were obtained from the base of the brain to the vertex without contrast administration.  Sagittal and coronal reconstructions were performed. .Automatic exposure control  (AEC) is utilized to reduce patient radiation exposure. COMPARISON: 02/04/2022 FINDINGS: There is no intracranial mass or lesion seen.  No hemorrhage is seen.  No infarct is seen.  The ventricles and basilar cisterns appear normal.  Brain parenchyma appears grossly unremarkable. Posterior fossa appears normal.  The calvarium is intact.  The paranasal sinuses appear grossly unremarkable.     No acute abnormality seen Electronically signed by: Keerthi Rose Date:    02/07/2023 Time:    14:15      CT Head Without Contrast   Final Result      No acute abnormality seen         Electronically signed by: Keerthi Rose   Date:    02/07/2023   Time:    14:15      MRI Lumbar Spine Without Contrast    (Results Pending)         ASSESSMENT     Patient Active Problem List   Diagnosis    Iron deficiency anemia    Anxiety and depression    Bradycardia    Dyspnea    HTN (hypertension)    HLD (hyperlipidemia)    PAD (peripheral artery disease)    CAD (coronary artery disease)    Acute pancreatitis    Back pain    Paresthesias    Lumbar back pain with radiculopathy affecting left lower extremity          PLAN:   Patient admitted to Medicine Service for observation I will be planning MRI of the lumbar spine without contrast I do not think there is anything surgical here but I definitely think she would benefit from physical therapy as an outpatient.  Electrolytes noted to be normal, patient with just Kenalog generalized paresthesias she is notably iron deficient, will plan on replacing her  iron this morning.  Also recommending checking folate and B12 level as well.  Patient has no evidence of overt bleeding she denies any dark sticky tarry stools.  Will plan endoscopic workup as an outpatient.  She is on dual antiplatelet therapy and she is not on a PPI.      Prophylaxis:up and ambulatory on DAPT  Code Status: Full      Bonilla Robles MD  Hospital Medicine  02/08/2023         If the patient has been admitted under observation status, it is at my discretion and under our care with hospital medicine services. [TWA]

## 2023-02-09 NOTE — DISCHARGE SUMMARY
Patient underwent MRI of the lumbar spine without any significant findings.  She also had an iron infusion.  Clinically stable.  She will be set up for PT and OT as an outpatient and will see her in the office next week.  Discharge medicine reconciliation completed.  Discharge same day as admission please see full H&P for more details.

## 2023-02-17 ENCOUNTER — TELEPHONE (OUTPATIENT)
Dept: INTERNAL MEDICINE | Facility: CLINIC | Age: 63
End: 2023-02-17
Payer: COMMERCIAL

## 2023-02-17 NOTE — TELEPHONE ENCOUNTER
----- Message from Justin Siddiqui MA sent at 2/17/2023 10:47 AM CST -----    ----- Message -----  From: Karson Bruce  Sent: 2/17/2023  10:27 AM CST  To: [facility] Staff    .Type:  Sooner Apoointment Request    Caller is requesting a sooner appointment.  Caller declined first available appointment listed below.  Caller will not accept being placed on the waitlist and is requesting a message be sent to doctor.  Name of Caller:pt  When is the first available appointment?04/04/23  Symptoms:  Would the patient rather a call back or a response via [facility]? Call back  Best Call Back Number:[phone]  Additional Information: pt is requesting a follow up from hospital stay pt was not informed to schedule hospital follow up from hospital or Dr. Zavala pt is just requesting one for her self, pt denied scheduling with overflow wanted Dr. Zavala pt scheduled appt for 04/04/23 but wanted to speak with office and was added to waitlist

## 2023-03-16 ENCOUNTER — OFFICE VISIT (OUTPATIENT)
Dept: INTERNAL MEDICINE | Facility: CLINIC | Age: 63
End: 2023-03-16
Payer: COMMERCIAL

## 2023-03-16 VITALS
SYSTOLIC BLOOD PRESSURE: 128 MMHG | WEIGHT: 191 LBS | HEART RATE: 90 BPM | RESPIRATION RATE: 16 BRPM | BODY MASS INDEX: 31.82 KG/M2 | OXYGEN SATURATION: 98 % | TEMPERATURE: 97 F | DIASTOLIC BLOOD PRESSURE: 80 MMHG | HEIGHT: 65 IN

## 2023-03-16 DIAGNOSIS — D50.8 IRON DEFICIENCY ANEMIA SECONDARY TO INADEQUATE DIETARY IRON INTAKE: ICD-10-CM

## 2023-03-16 DIAGNOSIS — I25.10 CORONARY ARTERY DISEASE, UNSPECIFIED VESSEL OR LESION TYPE, UNSPECIFIED WHETHER ANGINA PRESENT, UNSPECIFIED WHETHER NATIVE OR TRANSPLANTED HEART: ICD-10-CM

## 2023-03-16 DIAGNOSIS — Z12.31 SCREENING MAMMOGRAM FOR BREAST CANCER: Primary | ICD-10-CM

## 2023-03-16 DIAGNOSIS — I10 HYPERTENSION, UNSPECIFIED TYPE: ICD-10-CM

## 2023-03-16 DIAGNOSIS — F41.9 ANXIETY AND DEPRESSION: ICD-10-CM

## 2023-03-16 DIAGNOSIS — F32.A ANXIETY AND DEPRESSION: ICD-10-CM

## 2023-03-16 DIAGNOSIS — R06.00 DYSPNEA, UNSPECIFIED TYPE: ICD-10-CM

## 2023-03-16 PROCEDURE — 3008F BODY MASS INDEX DOCD: CPT | Mod: CPTII,,, | Performed by: INTERNAL MEDICINE

## 2023-03-16 PROCEDURE — 99214 PR OFFICE/OUTPT VISIT, EST, LEVL IV, 30-39 MIN: ICD-10-PCS | Mod: ,,, | Performed by: INTERNAL MEDICINE

## 2023-03-16 PROCEDURE — 3008F PR BODY MASS INDEX (BMI) DOCUMENTED: ICD-10-PCS | Mod: CPTII,,, | Performed by: INTERNAL MEDICINE

## 2023-03-16 PROCEDURE — 1159F MED LIST DOCD IN RCRD: CPT | Mod: CPTII,,, | Performed by: INTERNAL MEDICINE

## 2023-03-16 PROCEDURE — 3079F DIAST BP 80-89 MM HG: CPT | Mod: CPTII,,, | Performed by: INTERNAL MEDICINE

## 2023-03-16 PROCEDURE — 3074F PR MOST RECENT SYSTOLIC BLOOD PRESSURE < 130 MM HG: ICD-10-PCS | Mod: CPTII,,, | Performed by: INTERNAL MEDICINE

## 2023-03-16 PROCEDURE — 1159F PR MEDICATION LIST DOCUMENTED IN MEDICAL RECORD: ICD-10-PCS | Mod: CPTII,,, | Performed by: INTERNAL MEDICINE

## 2023-03-16 PROCEDURE — 1160F RVW MEDS BY RX/DR IN RCRD: CPT | Mod: CPTII,,, | Performed by: INTERNAL MEDICINE

## 2023-03-16 PROCEDURE — 99214 OFFICE O/P EST MOD 30 MIN: CPT | Mod: ,,, | Performed by: INTERNAL MEDICINE

## 2023-03-16 PROCEDURE — 3074F SYST BP LT 130 MM HG: CPT | Mod: CPTII,,, | Performed by: INTERNAL MEDICINE

## 2023-03-16 PROCEDURE — 1160F PR REVIEW ALL MEDS BY PRESCRIBER/CLIN PHARMACIST DOCUMENTED: ICD-10-PCS | Mod: CPTII,,, | Performed by: INTERNAL MEDICINE

## 2023-03-16 PROCEDURE — 3079F PR MOST RECENT DIASTOLIC BLOOD PRESSURE 80-89 MM HG: ICD-10-PCS | Mod: CPTII,,, | Performed by: INTERNAL MEDICINE

## 2023-03-16 RX ORDER — CETIRIZINE HYDROCHLORIDE 10 MG/1
10 TABLET ORAL DAILY
Qty: 90 TABLET | Refills: 3 | Status: SHIPPED | OUTPATIENT
Start: 2023-03-16 | End: 2023-08-04 | Stop reason: SDUPTHER

## 2023-03-16 RX ORDER — ESCITALOPRAM OXALATE 20 MG/1
20 TABLET ORAL NIGHTLY
Qty: 90 TABLET | Refills: 3 | Status: SHIPPED | OUTPATIENT
Start: 2023-03-16 | End: 2023-08-04 | Stop reason: SDUPTHER

## 2023-03-16 NOTE — PROGRESS NOTES
Subjective:      Patient ID: Tiny Silverman is a 62 y.o. female.    Chief Complaint: Follow-up      Patient: Tiny Silverman  MRN: [id number]  STATUS: OP- Observation   DOS: 2/8/2023   PCP: Bonilla Robles MD  Admit and Discharge on 2-8-2023     HISTORY OF PRESENT ILLNESS   62-year-old  female with significant past medical history presented with complaints of paresthesias kind of generalized around the mouth to the fingertips.  She also complains of lumbar back pain with associated left-sided radiculopathy.  Had hospital in December for presumed acute pancreatitis, had back pain at that visit as well.  She presented back to the ER on 01/29/2023 with lower back pain was diagnosed with urinary tract infection and sent home with prescription for Omnicef, hydrocodone and Norflex.  Over the course of the past several days she states the back pain and left-sided radicular symptoms are minimally improved she states she has trouble even lifting her left leg in the bed.  She is ambulating without assistance and denies any weakness in that leg.  She denies any saddle paresthesias.  She does have some generalized paresthesias kind of localized she was worked up for acute CVA, patient with absolutely no focal deficits on exam noted to have H&H of 8.8 and 28.5, iron saturation noted to be 6% she is on dual antiplatelet therapy for strong history of vascular disease with several stents.  On the morning following admission she reports that she is feeling okay she has no blurry vision, trouble with her speech no leg weakness.  Just a paresthesias.  Plan for MRI of lumbar spine without contrast this morning, iron infusion ordered.  Patient is anxious to go home.     CT of the head with no abnormalities     History of COVID 19 infection 1/2021 with monoclonal antibody on 1-  History of anxiety, severe coronary artery disease status post previous stent placements x7 , peripheral arterial disease, hypertension,  hyperlipidemia,  elective CABG per Dr. Flores. Reported attempted CAB ×1 with left lower extremity endoscopic venous harvesting but unsuccessful; underwent transmyocardial laser revascularization.    History of subdural hematoma, monitored by trauma service as well as neurosurgery.  Dr. Olivo-cardiology; carotid disease  Colonoscopy in October 2018 by Dr. Byrnes, repeat in 5 years.    She has a cough that she can't get rid of  Has smelly urine  Smoked for 40 years quit in 2015  Pills get stuck in the back her throat, food gets stuck  No recent EGD  Reports that she can't remember anything  She is exhausted  She can't get out of bed on Saturdays  She did not go to PT; she has a 45 dollars a visit  Needs to have labs done today  Patient is on sudafed  Advised on need for zyrtec    Past Medical History:  Past Medical History:   Diagnosis Date    Acid reflux     Angina pectoris     Anxiety and depression     Blockage of coronary artery of heart     CAD (coronary artery disease)     Claustrophobia     Claustrophobia     Mild    HLD (hyperlipidemia)     HTN (hypertension)     Kidney stones     Obesity, unspecified     CROW (obstructive sleep apnea)     PAD (peripheral artery disease)     Pain in right arm     Pancreatitis     Pancreatitis     Peripheral arterial disease     Personal history of colonic polyps 10/12/2018    Dr. Bonilla Zavala    Pseudoaneurysm     SOB (shortness of breath)     TIA (transient ischemic attack)     Weakness of right hand      Past Surgical History:   Procedure Laterality Date    APPENDECTOMY  1977    Application of short arm splint      Carotid Angiogram  08/24/2022    COLONOSCOPY W/ POLYPECTOMY  10/12/2018    Dr. Bonilla Zavala    CORONARY STENT PLACEMENT N/A     circumflex    Cyst of breast  2002    Dilation of Coronary Artery       ECTOPIC PREGNANCY SURGERY      Excision of Left Greater Saphenous  N/A     Fluroscopy of Left Heart Using Low Osmolar Contrast  N/A     HYSTERECTOMY  1998     Immobilzation of Right Lower Arm Using Splint      LAPAROSCOPIC CHOLECYSTECTOMY N/A     LAPAROSCOPIC TOTAL HYSTERECTOMY  07/26/1998    Laroscopic  N/A     Percutaneous Transluminal N/A     Resection of Gallbladder      TONSILLECTOMY  1962    TRANSCAROTID ARTERY REVASCULARIZATION (TCAR) Right      Review of patient's allergies indicates:   Allergen Reactions    Penicillins      Other reaction(s): was uanable to arouse     Current Outpatient Medications on File Prior to Visit   Medication Sig Dispense Refill    aspirin (ECOTRIN) 81 MG EC tablet aspirin 81 mg tablet,delayed release   Take 1 tablet every day by oral route.      atorvastatin (LIPITOR) 40 MG tablet Take 40 mg by mouth once daily.      busPIRone (BUSPAR) 5 MG Tab Take 1 tablet (5 mg total) by mouth 2 (two) times daily. 180 tablet 3    ezetimibe (ZETIA) 10 mg tablet ezetimibe 10 mg tablet      fluticasone propionate (FLONASE) 50 mcg/actuation nasal spray 1 spray (50 mcg total) by Each Nostril route 2 (two) times a day. 16 g 3    nitroGLYCERIN (NITROSTAT) 0.4 MG SL tablet nitroglycerin 0.4 mg sublingual tablet   TAKE 1 TABLET UNDER THE TONGUE ONCE A DAY AS NEEDED FOR CHEST PAIN      ranolazine (RANEXA) 1,000 mg Tb12 ranolazine ER 1,000 mg tablet,extended release,12 hr   TAKE 1 TABLET BY MOUTH TWICE A DAY      ticagrelor (BRILINTA) 90 mg tablet Brilinta 90 mg tablet      [DISCONTINUED] EScitalopram oxalate (LEXAPRO) 20 MG tablet Take 0.5 tablets (10 mg total) by mouth once daily. 90 tablet 3    [DISCONTINUED] ferrous sulfate (FEOSOL) 325 mg (65 mg iron) Tab tablet once daily.      [DISCONTINUED] HYDROcodone-acetaminophen (NORCO) 7.5-325 mg per tablet Take 1 tablet by mouth every 6 (six) hours as needed for Pain. 16 tablet 0    [DISCONTINUED] ondansetron (ZOFRAN) 4 MG tablet ondansetron HCl 4 mg tablet   TAKE 1 TABLET BY MOUTH EVERY 8 HOURS AS NEEDED FOR NAUSEA AND VOMITING       No current facility-administered medications on file prior to visit.     Social  History     Socioeconomic History    Marital status: Single   Tobacco Use    Smoking status: Former     Packs/day: 1.50     Years: 40.00     Pack years: 60.00     Types: Cigarettes     Start date: 1975     Quit date: 2015     Years since quittin.7    Smokeless tobacco: Never    Tobacco comments:     Quit 2015   Substance and Sexual Activity    Alcohol use: Never    Drug use: Never    Sexual activity: Not Currently     Partners: Male     Birth control/protection: Abstinence     Social Determinants of Health     Financial Resource Strain: Low Risk     Difficulty of Paying Living Expenses: Not very hard   Food Insecurity: No Food Insecurity    Worried About Running Out of Food in the Last Year: Never true    Ran Out of Food in the Last Year: Never true   Transportation Needs: No Transportation Needs    Lack of Transportation (Medical): No    Lack of Transportation (Non-Medical): No   Physical Activity: Inactive    Days of Exercise per Week: 0 days    Minutes of Exercise per Session: 0 min   Stress: No Stress Concern Present    Feeling of Stress : Only a little   Social Connections: Unknown    Frequency of Communication with Friends and Family: Three times a week    Frequency of Social Gatherings with Friends and Family: Never    Active Member of Clubs or Organizations: No    Attends Club or Organization Meetings: Patient refused    Marital Status:    Housing Stability: Low Risk     Unable to Pay for Housing in the Last Year: No    Number of Places Lived in the Last Year: 2    Unstable Housing in the Last Year: No     Family History   Problem Relation Age of Onset    Cancer Mother          at 62    Hypertension Mother     Breast cancer Mother     Lung cancer Mother     Hypertension Father     Coronary artery disease Father     Early death Father          at 46    Heart disease Father     Stroke Sister     Hypertension Sister     Early death Sister          at 32    Hyperlipidemia  "Sister     Hypertension Brother     Coronary artery disease Brother         CABG    Heart disease Brother     Cancer Maternal Aunt     Early death Maternal Aunt          at 56    Heart disease Maternal Aunt     Heart disease Maternal Aunt     Hypertension Sister     Kidney disease Paternal Aunt        Review of Systems  A comprehensive review of systems was performed and was negative with exception of what is documented above.     Objective:   /80 (BP Location: Left arm, Patient Position: Sitting, BP Method: Medium (Manual))   Pulse 90   Temp 97.3 °F (36.3 °C) (Temporal)   Resp 16   Ht 5' 5" (1.651 m)   Wt 86.6 kg (191 lb)   SpO2 98%   BMI 31.78 kg/m²   Physical Exam  General : Alert and oriented, No acute distress, afebrile.  Eye : PERRLA. EOMI. Normal conjunctiva, Sclerae are nonicteric.   Respiratory : Respirations are non-labored and clear to auscultation bilaterally. Symmetrical air entry bilaterally, no crackles, no wheezes, no rhonchi. No cyanosis, no clubbing.  Cardiovascular : Normal rate, Regular rhythm. No murmurs, rubs, or gallops. Pulses are 2+ throughout. No JVD. No Edema.  Gastrointestinal : Soft, nontender, non-distended, bowel sounds are present in all quadrants, no organomegaly, no guarding, no rebound.  Musculoskeletal : Normal range of motion throughout. No muscle tenderness.  Integumentary : Warm, moist, intact.  Neurologic : Alert, Oriented  Psychiatric : Cooperative, Flat  Assessment/ Plan:   1. Screening mammogram for breast cancer  -     Mammo Digital Screening Bilat; Future; Expected date: 2023    2. Iron deficiency anemia secondary to inadequate dietary iron intake  -     Iron and TIBC; Future; Expected date: 2023  -     Ferritin; Future; Expected date: 2023  -     Reticulocytes; Future; Expected date: 2023  -     Path Review, Peripheral Smear  -     Comprehensive Metabolic Panel; Future; Expected date: 2023  -     Urinalysis, Reflex to Urine " Culture; Future; Expected date: 03/17/2023  -     CBC Auto Differential; Future; Expected date: 03/16/2023  -     Vitamin B12; Future; Expected date: 03/16/2023    3. Dyspnea, unspecified type    4. Anxiety and depression    5. Hypertension, unspecified type    6. Coronary artery disease, unspecified vessel or lesion type, unspecified whether angina present, unspecified whether native or transplanted heart    Other orders  -     EScitalopram oxalate (LEXAPRO) 20 MG tablet; Take 1 tablet (20 mg total) by mouth every evening.  Dispense: 90 tablet; Refill: 3  -     cetirizine (ZYRTEC) 10 MG tablet; Take 1 tablet (10 mg total) by mouth once daily.  Dispense: 90 tablet; Refill: 3     Patient reports variable blood pressures states that she was told by Cardiology to take Sudafed we strongly advise her to discontinue use of Sudafed and take an antihistamine instead, i.e. Zyrtec which was sent to pharmacy.  She is depressed unable to get out of bed on Saturday and does feel like going work on most days.  Advised to increase Lexapro dose to 20 mg at bedtime.  We have not seen her yet for hospital follow-up her hospital discharge was actually on 02/08 so we are past the point for a true hospital follow-up but either way she was given iron in the hospital and this needs to be rechecked I was discussed with her at the time of her hospital discharge that she needed to follow up with her gastroenterologist for her anemia which has not yet been done either so will have some further recommendations based on the results of her lab studies today.  Her dyspnea is likely multifactorial from her chronic disease involving her chronic heart conditions she follows up with the cardiologist next week, could also be compounded by some ongoing iron deficiency which may need further investigation.  Further recs to follow.    An office visit for an established patient was performed. 10 minutes was used for reviewing the patients chart [facility]  [facility]. 15 minutes was used during the visit in regards to taking the patient history and physical exam. There was also an additional 5 minutes spent on education and counseling regarding medical conditions, current medications including risk/benefit and side effects/adverse events, vaccine counseling. After leaving the exam room, the provider then spent an additional 5 minutes completing the electronic health record.    The patient is receptive, expresses understanding and is agreeable to plan. All questions answered; total time spent was 35 minutes.          Follow up in about 3 months (around 6/16/2023) for NURSE PRACTITIONER, Follow Up, BP CHECK.

## 2023-03-20 ENCOUNTER — LAB VISIT (OUTPATIENT)
Dept: LAB | Facility: HOSPITAL | Age: 63
End: 2023-03-20
Attending: INTERNAL MEDICINE
Payer: COMMERCIAL

## 2023-03-20 ENCOUNTER — TELEPHONE (OUTPATIENT)
Dept: INTERNAL MEDICINE | Facility: CLINIC | Age: 63
End: 2023-03-20
Payer: COMMERCIAL

## 2023-03-20 DIAGNOSIS — D50.8 IRON DEFICIENCY ANEMIA SECONDARY TO INADEQUATE DIETARY IRON INTAKE: ICD-10-CM

## 2023-03-20 DIAGNOSIS — D50.8 IRON DEFICIENCY ANEMIA SECONDARY TO INADEQUATE DIETARY IRON INTAKE: Primary | ICD-10-CM

## 2023-03-20 LAB
ALBUMIN SERPL-MCNC: 3.6 G/DL (ref 3.4–4.8)
ALBUMIN/GLOB SERPL: 1.5 RATIO (ref 1.1–2)
ALP SERPL-CCNC: 85 UNIT/L (ref 40–150)
ALT SERPL-CCNC: 29 UNIT/L (ref 0–55)
AMORPH URATE CRY URNS QL MICRO: ABNORMAL /HPF
ANISOCYTOSIS BLD QL SMEAR: ABNORMAL
APPEARANCE UR: CLEAR
AST SERPL-CCNC: 30 UNIT/L (ref 5–34)
BACTERIA #/AREA URNS AUTO: ABNORMAL /HPF
BASOPHILS # BLD AUTO: 0.03 X10(3)/MCL (ref 0–0.2)
BASOPHILS NFR BLD AUTO: 0.4 %
BILIRUB UR QL STRIP.AUTO: NEGATIVE MG/DL
BILIRUBIN DIRECT+TOT PNL SERPL-MCNC: 0.4 MG/DL
BUN SERPL-MCNC: 17.6 MG/DL (ref 9.8–20.1)
CALCIUM SERPL-MCNC: 9.8 MG/DL (ref 8.4–10.2)
CHLORIDE SERPL-SCNC: 109 MMOL/L (ref 98–107)
CO2 SERPL-SCNC: 28 MMOL/L (ref 23–31)
COLOR UR AUTO: YELLOW
CREAT SERPL-MCNC: 0.89 MG/DL (ref 0.55–1.02)
EOSINOPHIL # BLD AUTO: 0.11 X10(3)/MCL (ref 0–0.9)
EOSINOPHIL NFR BLD AUTO: 1.6 %
ERYTHROCYTE [DISTWIDTH] IN BLOOD BY AUTOMATED COUNT: 17.9 % (ref 11.5–17)
FERRITIN SERPL-MCNC: 7.17 NG/ML (ref 4.63–204)
GFR SERPLBLD CREATININE-BSD FMLA CKD-EPI: >60 MLS/MIN/1.73/M2
GLOBULIN SER-MCNC: 2.4 GM/DL (ref 2.4–3.5)
GLUCOSE SERPL-MCNC: 106 MG/DL (ref 82–115)
GLUCOSE UR QL STRIP.AUTO: NEGATIVE MG/DL
HCT VFR BLD AUTO: 30.9 % (ref 37–47)
HEMATOLOGIST REVIEW: NORMAL
HGB BLD-MCNC: 9.2 G/DL (ref 12–16)
IMM GRANULOCYTES # BLD AUTO: 0.02 X10(3)/MCL (ref 0–0.04)
IMM GRANULOCYTES NFR BLD AUTO: 0.3 %
IRON SATN MFR SERPL: 2 % (ref 20–50)
IRON SERPL-MCNC: 6 UG/DL (ref 50–170)
KETONES UR QL STRIP.AUTO: NEGATIVE MG/DL
LEUKOCYTE ESTERASE UR QL STRIP.AUTO: NEGATIVE UNIT/L
LYMPHOCYTES # BLD AUTO: 1.2 X10(3)/MCL (ref 0.6–4.6)
LYMPHOCYTES NFR BLD AUTO: 18 %
MACROCYTES BLD QL SMEAR: ABNORMAL
MCH RBC QN AUTO: 25.6 PG
MCHC RBC AUTO-ENTMCNC: 29.8 G/DL (ref 33–36)
MCV RBC AUTO: 86.1 FL (ref 80–94)
MONOCYTES # BLD AUTO: 0.73 X10(3)/MCL (ref 0.1–1.3)
MONOCYTES NFR BLD AUTO: 10.9 %
MUCOUS THREADS URNS QL MICRO: ABNORMAL /LPF
NEUTROPHILS # BLD AUTO: 4.59 X10(3)/MCL (ref 2.1–9.2)
NEUTROPHILS NFR BLD AUTO: 68.8 %
NITRITE UR QL STRIP.AUTO: NEGATIVE
NRBC BLD AUTO-RTO: 0 %
PH UR STRIP.AUTO: 6 [PH]
PLATELET # BLD AUTO: 305 X10(3)/MCL (ref 130–400)
PLATELET # BLD EST: NORMAL 10*3/UL
PMV BLD AUTO: 10.1 FL (ref 7.4–10.4)
POLYCHROMASIA BLD QL SMEAR: ABNORMAL
POTASSIUM SERPL-SCNC: 4.2 MMOL/L (ref 3.5–5.1)
PROT SERPL-MCNC: 6 GM/DL (ref 5.8–7.6)
PROT UR QL STRIP.AUTO: NEGATIVE MG/DL
RBC # BLD AUTO: 3.59 X10(6)/MCL (ref 4.2–5.4)
RBC #/AREA URNS AUTO: ABNORMAL /HPF
RBC MORPH BLD: ABNORMAL
RBC UR QL AUTO: NEGATIVE UNIT/L
RET# (OHS): 0.11 (ref 0.02–0.08)
RETICULOCYTE COUNT AUTOMATED (OLG): 3 % (ref 1.1–2.1)
SODIUM SERPL-SCNC: 144 MMOL/L (ref 136–145)
SP GR UR STRIP.AUTO: 1.01 (ref 1–1.03)
SQUAMOUS #/AREA URNS AUTO: ABNORMAL /HPF
TIBC SERPL-MCNC: 290 UG/DL (ref 70–310)
TIBC SERPL-MCNC: 296 UG/DL (ref 250–450)
TRANSFERRIN SERPL-MCNC: 286 MG/DL (ref 173–360)
UROBILINOGEN UR STRIP-ACNC: 0.2 MG/DL
VIT B12 SERPL-MCNC: 696 PG/ML (ref 213–816)
WBC # SPEC AUTO: 6.7 X10(3)/MCL (ref 4.5–11.5)
WBC #/AREA URNS AUTO: ABNORMAL /HPF

## 2023-03-20 PROCEDURE — 80053 COMPREHEN METABOLIC PANEL: CPT

## 2023-03-20 PROCEDURE — 85045 AUTOMATED RETICULOCYTE COUNT: CPT

## 2023-03-20 PROCEDURE — 83550 IRON BINDING TEST: CPT

## 2023-03-20 PROCEDURE — 81001 URINALYSIS AUTO W/SCOPE: CPT

## 2023-03-20 PROCEDURE — 82607 VITAMIN B-12: CPT

## 2023-03-20 PROCEDURE — 85025 COMPLETE CBC W/AUTO DIFF WBC: CPT

## 2023-03-20 PROCEDURE — 36415 COLL VENOUS BLD VENIPUNCTURE: CPT

## 2023-03-20 PROCEDURE — 82728 ASSAY OF FERRITIN: CPT

## 2023-03-20 RX ORDER — DIPHENHYDRAMINE HYDROCHLORIDE 50 MG/ML
50 INJECTION INTRAMUSCULAR; INTRAVENOUS ONCE AS NEEDED
Status: CANCELLED | OUTPATIENT
Start: 2023-03-22

## 2023-03-20 RX ORDER — METHYLPREDNISOLONE SOD SUCC 125 MG
125 VIAL (EA) INJECTION ONCE AS NEEDED
Status: CANCELLED | OUTPATIENT
Start: 2023-03-22

## 2023-03-20 RX ORDER — SODIUM CHLORIDE 0.9 % (FLUSH) 0.9 %
10 SYRINGE (ML) INJECTION
Status: CANCELLED | OUTPATIENT
Start: 2023-03-22

## 2023-03-20 RX ORDER — HEPARIN 100 UNIT/ML
5 SYRINGE INTRAVENOUS
Status: CANCELLED | OUTPATIENT
Start: 2023-03-22

## 2023-03-20 RX ORDER — EPINEPHRINE 0.3 MG/.3ML
0.3 INJECTION SUBCUTANEOUS ONCE AS NEEDED
Status: CANCELLED | OUTPATIENT
Start: 2023-03-22

## 2023-03-20 RX ORDER — SODIUM CHLORIDE 9 MG/ML
INJECTION, SOLUTION INTRAVENOUS CONTINUOUS
Status: CANCELLED | OUTPATIENT
Start: 2023-03-22

## 2023-03-20 NOTE — TELEPHONE ENCOUNTER
----- Message from Bonilla Robles MD sent at 3/20/2023  8:32 AM CDT -----  Therapy plan placed  [facility] ordered  Recheck iron studies 6 weeks from second infusion

## 2023-03-20 NOTE — PROGRESS NOTES
Laboratory studies reviewed patient with iron saturation of 2% ; we need to arrange IV iron infusion with Injectafer; We also need to obtain 3 stools for blood and patient needs follow-up with gastroenterology.

## 2023-03-20 NOTE — PROGRESS NOTES
Therapy plan placed  [facility] ordered  Recheck iron studies 6 weeks from second infusion

## 2023-03-22 ENCOUNTER — TELEPHONE (OUTPATIENT)
Dept: INTERNAL MEDICINE | Facility: CLINIC | Age: 63
End: 2023-03-22
Payer: COMMERCIAL

## 2023-03-22 ENCOUNTER — PATIENT MESSAGE (OUTPATIENT)
Dept: INTERNAL MEDICINE | Facility: CLINIC | Age: 63
End: 2023-03-22
Payer: COMMERCIAL

## 2023-03-22 PROCEDURE — 82272 OCCULT BLD FECES 1-3 TESTS: CPT | Performed by: INTERNAL MEDICINE

## 2023-03-23 ENCOUNTER — INFUSION (OUTPATIENT)
Dept: INFUSION THERAPY | Facility: HOSPITAL | Age: 63
End: 2023-03-23
Attending: INTERNAL MEDICINE
Payer: COMMERCIAL

## 2023-03-23 VITALS
BODY MASS INDEX: 32.03 KG/M2 | TEMPERATURE: 98 F | RESPIRATION RATE: 18 BRPM | WEIGHT: 192.5 LBS | DIASTOLIC BLOOD PRESSURE: 81 MMHG | SYSTOLIC BLOOD PRESSURE: 144 MMHG | HEART RATE: 74 BPM | OXYGEN SATURATION: 98 %

## 2023-03-23 DIAGNOSIS — D50.8 IRON DEFICIENCY ANEMIA SECONDARY TO INADEQUATE DIETARY IRON INTAKE: Primary | ICD-10-CM

## 2023-03-23 PROCEDURE — 63600175 PHARM REV CODE 636 W HCPCS: Mod: JZ,JG | Performed by: INTERNAL MEDICINE

## 2023-03-23 PROCEDURE — 25000003 PHARM REV CODE 250: Performed by: INTERNAL MEDICINE

## 2023-03-23 PROCEDURE — 96365 THER/PROPH/DIAG IV INF INIT: CPT

## 2023-03-23 PROCEDURE — 82272 OCCULT BLD FECES 1-3 TESTS: CPT | Performed by: INTERNAL MEDICINE

## 2023-03-23 RX ORDER — SODIUM CHLORIDE 9 MG/ML
INJECTION, SOLUTION INTRAVENOUS CONTINUOUS
Status: CANCELLED | OUTPATIENT
Start: 2023-03-30

## 2023-03-23 RX ORDER — EPINEPHRINE 0.3 MG/.3ML
0.3 INJECTION SUBCUTANEOUS ONCE AS NEEDED
Status: CANCELLED | OUTPATIENT
Start: 2023-03-30

## 2023-03-23 RX ORDER — SODIUM CHLORIDE 9 MG/ML
INJECTION, SOLUTION INTRAVENOUS ONCE
Status: DISCONTINUED | OUTPATIENT
Start: 2023-03-23 | End: 2023-03-23 | Stop reason: HOSPADM

## 2023-03-23 RX ORDER — SODIUM CHLORIDE 0.9 % (FLUSH) 0.9 %
10 SYRINGE (ML) INJECTION
Status: DISCONTINUED | OUTPATIENT
Start: 2023-03-23 | End: 2023-03-23 | Stop reason: HOSPADM

## 2023-03-23 RX ORDER — DIPHENHYDRAMINE HYDROCHLORIDE 50 MG/ML
50 INJECTION INTRAMUSCULAR; INTRAVENOUS ONCE AS NEEDED
Status: CANCELLED | OUTPATIENT
Start: 2023-03-30

## 2023-03-23 RX ORDER — SODIUM CHLORIDE 0.9 % (FLUSH) 0.9 %
10 SYRINGE (ML) INJECTION
Status: CANCELLED | OUTPATIENT
Start: 2023-03-30

## 2023-03-23 RX ORDER — HEPARIN 100 UNIT/ML
5 SYRINGE INTRAVENOUS
Status: DISCONTINUED | OUTPATIENT
Start: 2023-03-23 | End: 2023-03-23 | Stop reason: HOSPADM

## 2023-03-23 RX ORDER — HEPARIN 100 UNIT/ML
5 SYRINGE INTRAVENOUS
Status: CANCELLED | OUTPATIENT
Start: 2023-03-30

## 2023-03-23 RX ORDER — METHYLPREDNISOLONE SOD SUCC 125 MG
125 VIAL (EA) INJECTION ONCE AS NEEDED
Status: CANCELLED | OUTPATIENT
Start: 2023-03-30

## 2023-03-23 RX ADMIN — FERRIC CARBOXYMALTOSE INJECTION 750 MG: 50 INJECTION, SOLUTION INTRAVENOUS at 10:03

## 2023-03-23 NOTE — PLAN OF CARE
Patient received injectafer, tolerated well. No adverse reaction noted. Discharge in stable condition.

## 2023-03-24 ENCOUNTER — HOSPITAL ENCOUNTER (OUTPATIENT)
Dept: RADIOLOGY | Facility: HOSPITAL | Age: 63
Discharge: HOME | End: 2023-03-24
Attending: INTERNAL MEDICINE
Payer: COMMERCIAL

## 2023-03-24 DIAGNOSIS — Z12.31 SCREENING MAMMOGRAM FOR BREAST CANCER: ICD-10-CM

## 2023-03-24 LAB
COLOR STL: NORMAL
CONSISTENCY STL: NORMAL
HEMOCCULT SP1 STL QL: NEGATIVE
HEMOCCULT SP2 STL QL: NEGATIVE
HEMOCCULT SP3 STL QL: NEGATIVE

## 2023-03-24 PROCEDURE — 77067 SCR MAMMO BI INCL CAD: CPT | Mod: 26,,, | Performed by: RADIOLOGY

## 2023-03-24 PROCEDURE — 77067 MAMMO DIGITAL SCREENING BILAT WITH TOMO: ICD-10-PCS | Mod: 26,,, | Performed by: RADIOLOGY

## 2023-03-24 PROCEDURE — 77063 BREAST TOMOSYNTHESIS BI: CPT | Mod: 26,,, | Performed by: RADIOLOGY

## 2023-03-24 PROCEDURE — 77067 SCR MAMMO BI INCL CAD: CPT | Mod: TC

## 2023-03-24 PROCEDURE — 77063 MAMMO DIGITAL SCREENING BILAT WITH TOMO: ICD-10-PCS | Mod: 26,,, | Performed by: RADIOLOGY

## 2023-03-27 ENCOUNTER — TELEPHONE (OUTPATIENT)
Dept: INTERNAL MEDICINE | Facility: CLINIC | Age: 63
End: 2023-03-27
Payer: COMMERCIAL

## 2023-03-27 DIAGNOSIS — D50.8 IRON DEFICIENCY ANEMIA SECONDARY TO INADEQUATE DIETARY IRON INTAKE: Primary | ICD-10-CM

## 2023-03-27 NOTE — TELEPHONE ENCOUNTER
----- Message from Bonilla Robles MD sent at 3/24/2023 11:58 AM CDT -----  Stools for blood are negative, advise hematology follow up for iron deficency  Refer to Dr elizabeth Lejeune  And yes I still want her to have GI evaluation  ----- Message -----  From: Background User Lab  Sent: 3/20/2023   9:32 AM CDT  To: Bonilla Robles MD

## 2023-03-29 DIAGNOSIS — D50.8 IRON DEFICIENCY ANEMIA SECONDARY TO INADEQUATE DIETARY IRON INTAKE: Primary | ICD-10-CM

## 2023-03-30 ENCOUNTER — INFUSION (OUTPATIENT)
Dept: INFUSION THERAPY | Facility: HOSPITAL | Age: 63
End: 2023-03-30
Attending: INTERNAL MEDICINE
Payer: COMMERCIAL

## 2023-03-30 ENCOUNTER — TELEPHONE (OUTPATIENT)
Dept: INTERNAL MEDICINE | Facility: CLINIC | Age: 63
End: 2023-03-30
Payer: COMMERCIAL

## 2023-03-30 VITALS
DIASTOLIC BLOOD PRESSURE: 57 MMHG | RESPIRATION RATE: 18 BRPM | HEART RATE: 72 BPM | BODY MASS INDEX: 32.07 KG/M2 | TEMPERATURE: 98 F | HEIGHT: 65 IN | WEIGHT: 192.5 LBS | SYSTOLIC BLOOD PRESSURE: 139 MMHG

## 2023-03-30 DIAGNOSIS — D50.8 IRON DEFICIENCY ANEMIA SECONDARY TO INADEQUATE DIETARY IRON INTAKE: Primary | ICD-10-CM

## 2023-03-30 PROCEDURE — 96365 THER/PROPH/DIAG IV INF INIT: CPT

## 2023-03-30 PROCEDURE — 63600175 PHARM REV CODE 636 W HCPCS: Mod: JZ,JG | Performed by: INTERNAL MEDICINE

## 2023-03-30 PROCEDURE — 25000003 PHARM REV CODE 250: Performed by: INTERNAL MEDICINE

## 2023-03-30 RX ORDER — HEPARIN 100 UNIT/ML
5 SYRINGE INTRAVENOUS
Status: CANCELLED | OUTPATIENT
Start: 2023-03-30

## 2023-03-30 RX ORDER — DIPHENHYDRAMINE HYDROCHLORIDE 50 MG/ML
50 INJECTION INTRAMUSCULAR; INTRAVENOUS ONCE AS NEEDED
OUTPATIENT
Start: 2023-03-30

## 2023-03-30 RX ORDER — SODIUM CHLORIDE 0.9 % (FLUSH) 0.9 %
10 SYRINGE (ML) INJECTION
Status: CANCELLED | OUTPATIENT
Start: 2023-03-30

## 2023-03-30 RX ORDER — SODIUM CHLORIDE 9 MG/ML
INJECTION, SOLUTION INTRAVENOUS CONTINUOUS
Status: CANCELLED | OUTPATIENT
Start: 2023-03-30

## 2023-03-30 RX ORDER — EPINEPHRINE 0.3 MG/.3ML
0.3 INJECTION SUBCUTANEOUS ONCE AS NEEDED
OUTPATIENT
Start: 2023-03-30

## 2023-03-30 RX ORDER — HEPARIN 100 UNIT/ML
5 SYRINGE INTRAVENOUS
Status: DISCONTINUED | OUTPATIENT
Start: 2023-03-30 | End: 2023-03-30 | Stop reason: HOSPADM

## 2023-03-30 RX ORDER — SODIUM CHLORIDE 0.9 % (FLUSH) 0.9 %
10 SYRINGE (ML) INJECTION
Status: DISCONTINUED | OUTPATIENT
Start: 2023-03-30 | End: 2023-03-30 | Stop reason: HOSPADM

## 2023-03-30 RX ORDER — SODIUM CHLORIDE 9 MG/ML
INJECTION, SOLUTION INTRAVENOUS
Status: DISCONTINUED | OUTPATIENT
Start: 2023-03-30 | End: 2023-03-30 | Stop reason: HOSPADM

## 2023-03-30 RX ORDER — METHYLPREDNISOLONE SOD SUCC 125 MG
125 VIAL (EA) INJECTION ONCE AS NEEDED
OUTPATIENT
Start: 2023-03-30

## 2023-03-30 RX ADMIN — FERRIC CARBOXYMALTOSE INJECTION 750 MG: 50 INJECTION, SOLUTION INTRAVENOUS at 10:03

## 2023-03-30 NOTE — TELEPHONE ENCOUNTER
----- Message from Bonilla Robles MD sent at 3/29/2023  7:03 PM CDT -----  Normal MMG. Repeat in 1 year.

## 2023-04-12 ENCOUNTER — TELEPHONE (OUTPATIENT)
Dept: INTERNAL MEDICINE | Facility: CLINIC | Age: 63
End: 2023-04-12
Payer: COMMERCIAL

## 2023-04-12 ENCOUNTER — OFFICE VISIT (OUTPATIENT)
Dept: HEMATOLOGY/ONCOLOGY | Facility: CLINIC | Age: 63
End: 2023-04-12
Payer: COMMERCIAL

## 2023-04-12 VITALS
DIASTOLIC BLOOD PRESSURE: 74 MMHG | OXYGEN SATURATION: 98 % | TEMPERATURE: 97 F | BODY MASS INDEX: 33.21 KG/M2 | HEART RATE: 74 BPM | WEIGHT: 199.31 LBS | HEIGHT: 65 IN | RESPIRATION RATE: 18 BRPM | SYSTOLIC BLOOD PRESSURE: 138 MMHG

## 2023-04-12 DIAGNOSIS — R14.0 ABDOMINAL DISTENTION: Primary | ICD-10-CM

## 2023-04-12 DIAGNOSIS — D50.8 IRON DEFICIENCY ANEMIA SECONDARY TO INADEQUATE DIETARY IRON INTAKE: Primary | ICD-10-CM

## 2023-04-12 DIAGNOSIS — R10.13 EPIGASTRIC PAIN: ICD-10-CM

## 2023-04-12 DIAGNOSIS — D50.8 IRON DEFICIENCY ANEMIA SECONDARY TO INADEQUATE DIETARY IRON INTAKE: ICD-10-CM

## 2023-04-12 LAB
BASOPHILS # BLD AUTO: 0.03 X10(3)/MCL (ref 0–0.2)
BASOPHILS NFR BLD AUTO: 0.5 %
CANCER AG125 SERPL-ACNC: 3.3 UNIT/ML (ref 0–35)
CEA SERPL-MCNC: <1.73 NG/ML (ref 0–3)
EOSINOPHIL # BLD AUTO: 0.14 X10(3)/MCL (ref 0–0.9)
EOSINOPHIL NFR BLD AUTO: 2.4 %
ERYTHROCYTE [DISTWIDTH] IN BLOOD BY AUTOMATED COUNT: 21.7 % (ref 11.5–17)
FERRITIN SERPL-MCNC: 588.27 NG/ML (ref 4.63–204)
HAPTOGLOB SERPL-MCNC: 106 MG/DL (ref 63–273)
HCT VFR BLD AUTO: 38.2 % (ref 37–47)
HGB BLD-MCNC: 11.9 G/DL (ref 12–16)
IGA SERPL-MCNC: 101 MG/DL (ref 69–517)
IGG SERPL-MCNC: 868 MG/DL (ref 522–1631)
IGM SERPL-MCNC: 64 MG/DL (ref 33–293)
IMM GRANULOCYTES # BLD AUTO: 0.01 X10(3)/MCL (ref 0–0.04)
IMM GRANULOCYTES NFR BLD AUTO: 0.2 %
IRON SATN MFR SERPL: 38 % (ref 20–50)
IRON SERPL-MCNC: 93 UG/DL (ref 50–170)
LDH SERPL-CCNC: 159 U/L (ref 125–220)
LYMPHOCYTES # BLD AUTO: 1.33 X10(3)/MCL (ref 0.6–4.6)
LYMPHOCYTES NFR BLD AUTO: 22.7 %
MCH RBC QN AUTO: 28.3 PG (ref 27–31)
MCHC RBC AUTO-ENTMCNC: 31.2 G/DL (ref 33–36)
MCV RBC AUTO: 91 FL (ref 80–94)
MONOCYTES # BLD AUTO: 0.57 X10(3)/MCL (ref 0.1–1.3)
MONOCYTES NFR BLD AUTO: 9.7 %
NEUTROPHILS # BLD AUTO: 3.78 X10(3)/MCL (ref 2.1–9.2)
NEUTROPHILS NFR BLD AUTO: 64.5 %
PLATELET # BLD AUTO: 231 X10(3)/MCL (ref 130–400)
PMV BLD AUTO: 10.1 FL (ref 7.4–10.4)
RBC # BLD AUTO: 4.2 X10(6)/MCL (ref 4.2–5.4)
RET# (OHS): 0.14 (ref 0.02–0.08)
RETICULOCYTE COUNT AUTOMATED (OLG): 3.18 % (ref 1.1–2.1)
TIBC SERPL-MCNC: 153 UG/DL (ref 70–310)
TIBC SERPL-MCNC: 246 UG/DL (ref 250–450)
WBC # SPEC AUTO: 5.9 X10(3)/MCL (ref 4.5–11.5)

## 2023-04-12 PROCEDURE — 82784 ASSAY IGA/IGD/IGG/IGM EACH: CPT | Performed by: STUDENT IN AN ORGANIZED HEALTH CARE EDUCATION/TRAINING PROGRAM

## 2023-04-12 PROCEDURE — 83550 IRON BINDING TEST: CPT | Performed by: STUDENT IN AN ORGANIZED HEALTH CARE EDUCATION/TRAINING PROGRAM

## 2023-04-12 PROCEDURE — 82728 ASSAY OF FERRITIN: CPT | Performed by: STUDENT IN AN ORGANIZED HEALTH CARE EDUCATION/TRAINING PROGRAM

## 2023-04-12 PROCEDURE — 3075F SYST BP GE 130 - 139MM HG: CPT | Mod: CPTII,S$GLB,, | Performed by: STUDENT IN AN ORGANIZED HEALTH CARE EDUCATION/TRAINING PROGRAM

## 2023-04-12 PROCEDURE — 1159F MED LIST DOCD IN RCRD: CPT | Mod: CPTII,S$GLB,, | Performed by: STUDENT IN AN ORGANIZED HEALTH CARE EDUCATION/TRAINING PROGRAM

## 2023-04-12 PROCEDURE — 99999 PR PBB SHADOW E&M-EST. PATIENT-LVL V: CPT | Mod: PBBFAC,,, | Performed by: STUDENT IN AN ORGANIZED HEALTH CARE EDUCATION/TRAINING PROGRAM

## 2023-04-12 PROCEDURE — 3075F PR MOST RECENT SYSTOLIC BLOOD PRESS GE 130-139MM HG: ICD-10-PCS | Mod: CPTII,S$GLB,, | Performed by: STUDENT IN AN ORGANIZED HEALTH CARE EDUCATION/TRAINING PROGRAM

## 2023-04-12 PROCEDURE — 85025 COMPLETE CBC W/AUTO DIFF WBC: CPT | Performed by: STUDENT IN AN ORGANIZED HEALTH CARE EDUCATION/TRAINING PROGRAM

## 2023-04-12 PROCEDURE — 83010 ASSAY OF HAPTOGLOBIN QUANT: CPT | Performed by: STUDENT IN AN ORGANIZED HEALTH CARE EDUCATION/TRAINING PROGRAM

## 2023-04-12 PROCEDURE — 3008F PR BODY MASS INDEX (BMI) DOCUMENTED: ICD-10-PCS | Mod: CPTII,S$GLB,, | Performed by: STUDENT IN AN ORGANIZED HEALTH CARE EDUCATION/TRAINING PROGRAM

## 2023-04-12 PROCEDURE — 83521 IG LIGHT CHAINS FREE EACH: CPT | Mod: 90 | Performed by: STUDENT IN AN ORGANIZED HEALTH CARE EDUCATION/TRAINING PROGRAM

## 2023-04-12 PROCEDURE — 84165 PROTEIN E-PHORESIS SERUM: CPT | Performed by: STUDENT IN AN ORGANIZED HEALTH CARE EDUCATION/TRAINING PROGRAM

## 2023-04-12 PROCEDURE — 83615 LACTATE (LD) (LDH) ENZYME: CPT | Performed by: STUDENT IN AN ORGANIZED HEALTH CARE EDUCATION/TRAINING PROGRAM

## 2023-04-12 PROCEDURE — 85045 AUTOMATED RETICULOCYTE COUNT: CPT | Performed by: STUDENT IN AN ORGANIZED HEALTH CARE EDUCATION/TRAINING PROGRAM

## 2023-04-12 PROCEDURE — 99999 PR PBB SHADOW E&M-EST. PATIENT-LVL V: ICD-10-PCS | Mod: PBBFAC,,, | Performed by: STUDENT IN AN ORGANIZED HEALTH CARE EDUCATION/TRAINING PROGRAM

## 2023-04-12 PROCEDURE — 36415 COLL VENOUS BLD VENIPUNCTURE: CPT | Performed by: STUDENT IN AN ORGANIZED HEALTH CARE EDUCATION/TRAINING PROGRAM

## 2023-04-12 PROCEDURE — 99205 PR OFFICE/OUTPT VISIT, NEW, LEVL V, 60-74 MIN: ICD-10-PCS | Mod: S$GLB,,, | Performed by: STUDENT IN AN ORGANIZED HEALTH CARE EDUCATION/TRAINING PROGRAM

## 2023-04-12 PROCEDURE — 1160F PR REVIEW ALL MEDS BY PRESCRIBER/CLIN PHARMACIST DOCUMENTED: ICD-10-PCS | Mod: CPTII,S$GLB,, | Performed by: STUDENT IN AN ORGANIZED HEALTH CARE EDUCATION/TRAINING PROGRAM

## 2023-04-12 PROCEDURE — 82378 CARCINOEMBRYONIC ANTIGEN: CPT | Performed by: STUDENT IN AN ORGANIZED HEALTH CARE EDUCATION/TRAINING PROGRAM

## 2023-04-12 PROCEDURE — 3008F BODY MASS INDEX DOCD: CPT | Mod: CPTII,S$GLB,, | Performed by: STUDENT IN AN ORGANIZED HEALTH CARE EDUCATION/TRAINING PROGRAM

## 2023-04-12 PROCEDURE — 99205 OFFICE O/P NEW HI 60 MIN: CPT | Mod: S$GLB,,, | Performed by: STUDENT IN AN ORGANIZED HEALTH CARE EDUCATION/TRAINING PROGRAM

## 2023-04-12 PROCEDURE — 86304 IMMUNOASSAY TUMOR CA 125: CPT | Performed by: STUDENT IN AN ORGANIZED HEALTH CARE EDUCATION/TRAINING PROGRAM

## 2023-04-12 PROCEDURE — 3078F PR MOST RECENT DIASTOLIC BLOOD PRESSURE < 80 MM HG: ICD-10-PCS | Mod: CPTII,S$GLB,, | Performed by: STUDENT IN AN ORGANIZED HEALTH CARE EDUCATION/TRAINING PROGRAM

## 2023-04-12 PROCEDURE — 86334 IMMUNOFIX E-PHORESIS SERUM: CPT | Performed by: STUDENT IN AN ORGANIZED HEALTH CARE EDUCATION/TRAINING PROGRAM

## 2023-04-12 PROCEDURE — 3078F DIAST BP <80 MM HG: CPT | Mod: CPTII,S$GLB,, | Performed by: STUDENT IN AN ORGANIZED HEALTH CARE EDUCATION/TRAINING PROGRAM

## 2023-04-12 PROCEDURE — 1160F RVW MEDS BY RX/DR IN RCRD: CPT | Mod: CPTII,S$GLB,, | Performed by: STUDENT IN AN ORGANIZED HEALTH CARE EDUCATION/TRAINING PROGRAM

## 2023-04-12 PROCEDURE — 1159F PR MEDICATION LIST DOCUMENTED IN MEDICAL RECORD: ICD-10-PCS | Mod: CPTII,S$GLB,, | Performed by: STUDENT IN AN ORGANIZED HEALTH CARE EDUCATION/TRAINING PROGRAM

## 2023-04-12 NOTE — TELEPHONE ENCOUNTER
----- Message from Raina Quintero sent at 4/12/2023 11:48 AM CDT -----  .Type:  Needs Medical Advice    Who Called: pt       Would the patient rather a call back or a response via [facility]? Cb if needed  Best Call Back Number: [phone]  Additional Information: pt said referral for Gastro is not til 07/2023 she wanted to see what pcp wanted to do and to give her a call back

## 2023-04-12 NOTE — TELEPHONE ENCOUNTER
Spoke to Pt, advised to call and get on cancellation list and I will forward message to MD to see if we need to refer somewhere else or if July appt is ok, please advise

## 2023-04-13 LAB
ALBUMIN % SPEP (OHS): 56.4
ALBUMIN SERPL-MCNC: 3.6 G/DL (ref 3.4–4.8)
ALBUMIN/GLOB SERPL: 1.3 RATIO (ref 1.1–2)
ALPHA 1 GLOB (OHS): 0.24 GM/DL
ALPHA 1 GLOB% (OHS): 3.75
ALPHA 2 GLOB % (OHS): 11.54
ALPHA 2 GLOB (OHS): 0.74 GM/DL
BETA GLOB (OHS): 0.87 GM/DL
BETA GLOB% (OHS): 13.65
GAMMA GLOBULIN % (OHS): 14.67
GAMMA GLOBULIN (OHS): 0.94 GM/DL
GLOBULIN SER-MCNC: 2.8 GM/DL (ref 2.4–3.5)
M SPIKE % (OHS): NORMAL
M SPIKE (OHS): NORMAL
PATH REV: NORMAL
PROT SERPL-MCNC: 6.4 GM/DL (ref 5.8–7.6)

## 2023-04-13 NOTE — PROGRESS NOTES
Subjective:       Patient ID: Tiny Silverman is a 62 y.o. female.    Chief Complaint: New Patient    Diagnosis: Iron Deficiency Anemia    Current Treatment: Oral Iron Supplements    Treatment History:   Injectafer x2 March '23    HPI:   63yo F who presents in April '23 for evaluation of anemia. Mild anemia first really noted in December '22 when her hemoglobin was average around 11, however in February '23 her hemoglobin dropped to approximately 9. Workup into her anemia revealed evidence of iron deficiency anemia with iron level of 6 and Ferritin of 7. She was given Injectafer x2 by per PCP in March '22. B12 and folate were normal. FOBT was negative, however she is scheduled to undergo colonoscopy in the coming months. She was referred to hematology for further evaluation.     Today she complains of increased fatigue over the last 3 months. She denies any evidence of bleeding. She also notes increased abdominal distention over the last month to the point she can no longer fit in her clothes as well as a 20lb weight gain over the last 2-3 weeks.   Past Medical History:   Diagnosis Date    Acid reflux     Angina pectoris     Anxiety and depression     Blockage of coronary artery of heart     CAD (coronary artery disease)     Claustrophobia     Claustrophobia     Mild    HLD (hyperlipidemia)     HTN (hypertension)     Kidney stones     Obesity, unspecified     CROW (obstructive sleep apnea)     PAD (peripheral artery disease)     Pain in right arm     Pancreatitis     Pancreatitis     Peripheral arterial disease     Personal history of colonic polyps 10/12/2018    Dr. Bonilla Zavala    Pseudoaneurysm     SOB (shortness of breath)     TIA (transient ischemic attack)     Weakness of right hand       Past Surgical History:   Procedure Laterality Date    APPENDECTOMY  1977    Application of short arm splint      Carotid Angiogram  08/24/2022    COLONOSCOPY W/ POLYPECTOMY  10/12/2018    Dr. Bonilla Zavala    CORONARY STENT  PLACEMENT N/A     circumflex    Cyst of breast  2002    Dilation of Coronary Artery       ECTOPIC PREGNANCY SURGERY      Excision of Left Greater Saphenous  N/A     Fluroscopy of Left Heart Using Low Osmolar Contrast  N/A     HYSTERECTOMY      Immobilzation of Right Lower Arm Using Splint      LAPAROSCOPIC CHOLECYSTECTOMY N/A     LAPAROSCOPIC TOTAL HYSTERECTOMY  1998    Laroscopic  N/A     Percutaneous Transluminal N/A     Resection of Gallbladder      TONSILLECTOMY      TRANSCAROTID ARTERY REVASCULARIZATION (TCAR) Right      Social History     Socioeconomic History    Marital status: Single   Tobacco Use    Smoking status: Former     Packs/day: 1.50     Years: 40.00     Pack years: 60.00     Types: Cigarettes     Start date: 1975     Quit date: 2015     Years since quittin.7    Smokeless tobacco: Never    Tobacco comments:     Quit 2015   Substance and Sexual Activity    Alcohol use: Never    Drug use: Never    Sexual activity: Not Currently     Partners: Male     Birth control/protection: Abstinence     Social Determinants of Health     Financial Resource Strain: Low Risk     Difficulty of Paying Living Expenses: Not very hard   Food Insecurity: No Food Insecurity    Worried About Running Out of Food in the Last Year: Never true    Ran Out of Food in the Last Year: Never true   Transportation Needs: No Transportation Needs    Lack of Transportation (Medical): No    Lack of Transportation (Non-Medical): No   Physical Activity: Inactive    Days of Exercise per Week: 0 days    Minutes of Exercise per Session: 0 min   Stress: No Stress Concern Present    Feeling of Stress : Only a little   Social Connections: Unknown    Frequency of Communication with Friends and Family: Three times a week    Frequency of Social Gatherings with Friends and Family: Never    Active Member of Clubs or Organizations: No    Attends Club or Organization Meetings: Patient refused    Marital Status:     Housing Stability: Low Risk     Unable to Pay for Housing in the Last Year: No    Number of Places Lived in the Last Year: 2    Unstable Housing in the Last Year: No      Family History   Problem Relation Age of Onset    Cancer Mother          at 62    Hypertension Mother     Breast cancer Mother     Lung cancer Mother     Hypertension Father     Coronary artery disease Father     Early death Father          at 46    Heart disease Father     Stroke Sister     Hypertension Sister     Early death Sister          at 32    Hyperlipidemia Sister     Hypertension Brother     Coronary artery disease Brother         CABG    Heart disease Brother     Cancer Maternal Aunt     Early death Maternal Aunt          at 56    Heart disease Maternal Aunt     Heart disease Maternal Aunt     Hypertension Sister     Kidney disease Paternal Aunt       Review of patient's allergies indicates:   Allergen Reactions    Penicillins      Other reaction(s): was uanable to arouse      Review of Systems   Constitutional:  Positive for unexpected weight change. Negative for appetite change.   HENT:  Negative for mouth sores.    Eyes:  Negative for visual disturbance.   Respiratory:  Positive for shortness of breath. Negative for cough.    Cardiovascular:  Positive for chest pain.   Gastrointestinal:  Positive for abdominal pain. Negative for diarrhea.   Genitourinary:  Positive for frequency.   Musculoskeletal:  Positive for back pain.   Integumentary:  Negative for rash.   Neurological:  Positive for headaches.   Hematological:  Negative for adenopathy.   Psychiatric/Behavioral:  The patient is nervous/anxious.        Objective:      Vitals:    23 1331   BP: 138/74   Pulse: 74   Resp: 18   Temp: 97.4 °F (36.3 °C)       Physical Exam  Constitutional:       General: She is not in acute distress.     Appearance: Normal appearance. She is not ill-appearing.   HENT:      Head: Normocephalic and atraumatic.      Nose: Nose  normal.      Mouth/Throat:      Mouth: Mucous membranes are moist.      Pharynx: Oropharynx is clear.   Eyes:      Extraocular Movements: Extraocular movements intact.      Conjunctiva/sclera: Conjunctivae normal.      Pupils: Pupils are equal, round, and reactive to light.   Cardiovascular:      Rate and Rhythm: Normal rate and regular rhythm.      Pulses: Normal pulses.      Heart sounds: Normal heart sounds. No murmur heard.  Pulmonary:      Effort: Pulmonary effort is normal. No respiratory distress.      Breath sounds: Normal breath sounds.   Abdominal:      General: There is distension.      Palpations: Abdomen is soft.      Tenderness: There is abdominal tenderness (epigastric).   Musculoskeletal:         General: Normal range of motion.      Cervical back: Normal range of motion and neck supple.      Right lower leg: No edema.      Left lower leg: No edema.   Lymphadenopathy:      Cervical: No cervical adenopathy.   Skin:     General: Skin is warm and dry.   Neurological:      General: No focal deficit present.      Mental Status: She is alert and oriented to person, place, and time.       LABS AND IMAGING REVIEWED IN EPIC          Assessment:   Iron deficiency Anemia - Noted at the end of '22/beginning of '23. S/p IV iron infusions in March '23. Planned GI evaluation in Summer '23. Will evaluate for other possible contributing etiology to her new acute on chronic anemia        Plan:       - Agree with GI evaluation despite negative FOBT  - Will evaluate for other possible contributing factors to her anemia today  - Given rapid weight gain, abdominal pain on exam, and abdominal distention to the point she can no longer fit in her clothes will order ovarian and colon cancer tumor markers today as well as order CT A/P for possible ascites/mass evaluation  - RTC 4 weeks for MD visit, labs same day - CBC, CMP      Elizabeth Kennedy LeJeune, MD  Hematology/Oncology   [facility]

## 2023-04-14 LAB
KAPPA LC FREE SER-MCNC: 1.72 MG/DL (ref 0.33–1.94)
KAPPA LC FREE/LAMBDA FREE SER: 1.16 {RATIO} (ref 0.26–1.65)
LAMBDA LC FREE SERPL-MCNC: 1.48 MG/DL (ref 0.57–2.63)

## 2023-04-18 NOTE — TELEPHONE ENCOUNTER
Have called and left message with Dr Aldana and Inderjit's office concerning scheduling a sooner appt.

## 2023-04-20 NOTE — TELEPHONE ENCOUNTER
Referral reordered for Dr. Santa. Spoke to office they stated that they should be able to get pt in next week.   Pt informed that she shaun get a call to schedule appointment with Dr. Santa.

## 2023-04-25 ENCOUNTER — TELEPHONE (OUTPATIENT)
Dept: INTERNAL MEDICINE | Facility: CLINIC | Age: 63
End: 2023-04-25
Payer: COMMERCIAL

## 2023-04-25 NOTE — TELEPHONE ENCOUNTER
----- Message from Mattie Villasenor sent at 4/25/2023 11:23 AM CDT -----  Regarding: Medical Advice  Type:  Needs Medical Advice    Who Called: Tiny  Would the patient rather a call back or a response via [facility]? Call back  Best Call Back Number: [phone]  Additional Information: Tiny called to discuss her referral to Dr Santa.  She requested to speak for Justin and would like to have Justin to call her back.

## 2023-04-29 ENCOUNTER — PATIENT MESSAGE (OUTPATIENT)
Dept: ADMINISTRATIVE | Facility: OTHER | Age: 63
End: 2023-04-29
Payer: COMMERCIAL

## 2023-04-30 ENCOUNTER — PATIENT MESSAGE (OUTPATIENT)
Dept: ADMINISTRATIVE | Facility: OTHER | Age: 63
End: 2023-04-30
Payer: COMMERCIAL

## 2023-05-01 ENCOUNTER — PATIENT MESSAGE (OUTPATIENT)
Dept: ADMINISTRATIVE | Facility: OTHER | Age: 63
End: 2023-05-01
Payer: COMMERCIAL

## 2023-05-02 ENCOUNTER — HOSPITAL ENCOUNTER (OUTPATIENT)
Facility: HOSPITAL | Age: 63
Discharge: HOME | End: 2023-05-02
Attending: INTERNAL MEDICINE | Admitting: INTERNAL MEDICINE
Payer: COMMERCIAL

## 2023-05-02 ENCOUNTER — ANESTHESIA EVENT (OUTPATIENT)
Dept: ENDOSCOPY | Facility: HOSPITAL | Age: 63
End: 2023-05-02
Payer: COMMERCIAL

## 2023-05-02 ENCOUNTER — ANESTHESIA (OUTPATIENT)
Dept: ENDOSCOPY | Facility: HOSPITAL | Age: 63
End: 2023-05-02
Payer: COMMERCIAL

## 2023-05-02 VITALS
TEMPERATURE: 98 F | SYSTOLIC BLOOD PRESSURE: 147 MMHG | BODY MASS INDEX: 33.66 KG/M2 | HEIGHT: 65 IN | DIASTOLIC BLOOD PRESSURE: 69 MMHG | HEART RATE: 72 BPM | WEIGHT: 202 LBS | OXYGEN SATURATION: 98 % | RESPIRATION RATE: 17 BRPM

## 2023-05-02 DIAGNOSIS — D50.9 IRON DEFICIENCY ANEMIA, UNSPECIFIED IRON DEFICIENCY ANEMIA TYPE: ICD-10-CM

## 2023-05-02 PROCEDURE — D9220A PRA ANESTHESIA: ICD-10-PCS | Mod: ANES,,, | Performed by: ANESTHESIOLOGY

## 2023-05-02 PROCEDURE — 63600175 PHARM REV CODE 636 W HCPCS: Performed by: NURSE ANESTHETIST, CERTIFIED REGISTERED

## 2023-05-02 PROCEDURE — D9220A PRA ANESTHESIA: ICD-10-PCS | Mod: CRNA,,, | Performed by: NURSE ANESTHETIST, CERTIFIED REGISTERED

## 2023-05-02 PROCEDURE — 25000003 PHARM REV CODE 250: Performed by: NURSE ANESTHETIST, CERTIFIED REGISTERED

## 2023-05-02 PROCEDURE — 37000008 HC ANESTHESIA 1ST 15 MINUTES: Performed by: INTERNAL MEDICINE

## 2023-05-02 PROCEDURE — 37000009 HC ANESTHESIA EA ADD 15 MINS: Performed by: INTERNAL MEDICINE

## 2023-05-02 PROCEDURE — 27201423 OPTIME MED/SURG SUP & DEVICES STERILE SUPPLY: Performed by: INTERNAL MEDICINE

## 2023-05-02 PROCEDURE — D9220A PRA ANESTHESIA: Mod: CRNA,,, | Performed by: NURSE ANESTHETIST, CERTIFIED REGISTERED

## 2023-05-02 PROCEDURE — 43450 DILATE ESOPHAGUS 1/MULT PASS: CPT | Performed by: INTERNAL MEDICINE

## 2023-05-02 PROCEDURE — D9220A PRA ANESTHESIA: Mod: ANES,,, | Performed by: ANESTHESIOLOGY

## 2023-05-02 PROCEDURE — 43239 EGD BIOPSY SINGLE/MULTIPLE: CPT | Mod: 59 | Performed by: INTERNAL MEDICINE

## 2023-05-02 RX ORDER — ONDANSETRON 2 MG/ML
4 INJECTION INTRAMUSCULAR; INTRAVENOUS ONCE AS NEEDED
Status: CANCELLED | OUTPATIENT
Start: 2023-05-02 | End: 2034-09-27

## 2023-05-02 RX ORDER — SODIUM CHLORIDE, SODIUM GLUCONATE, SODIUM ACETATE, POTASSIUM CHLORIDE AND MAGNESIUM CHLORIDE 30; 37; 368; 526; 502 MG/100ML; MG/100ML; MG/100ML; MG/100ML; MG/100ML
INJECTION, SOLUTION INTRAVENOUS CONTINUOUS
Status: CANCELLED | OUTPATIENT
Start: 2023-05-02 | End: 2023-06-01

## 2023-05-02 RX ORDER — PROPOFOL 10 MG/ML
VIAL (ML) INTRAVENOUS
Status: DISCONTINUED | OUTPATIENT
Start: 2023-05-02 | End: 2023-05-02

## 2023-05-02 RX ORDER — LIDOCAINE HYDROCHLORIDE 20 MG/ML
INJECTION, SOLUTION EPIDURAL; INFILTRATION; INTRACAUDAL; PERINEURAL
Status: DISCONTINUED | OUTPATIENT
Start: 2023-05-02 | End: 2023-05-02

## 2023-05-02 RX ORDER — OMEPRAZOLE 40 MG/1
40 CAPSULE, DELAYED RELEASE ORAL DAILY
Qty: 30 CAPSULE | Refills: 11 | Status: SHIPPED | OUTPATIENT
Start: 2023-05-02 | End: 2024-05-01

## 2023-05-02 RX ORDER — PROPOFOL 10 MG/ML
VIAL (ML) INTRAVENOUS
Status: COMPLETED
Start: 2023-05-02 | End: 2023-05-02

## 2023-05-02 RX ORDER — LIDOCAINE HYDROCHLORIDE 10 MG/ML
INJECTION, SOLUTION EPIDURAL; INFILTRATION; INTRACAUDAL; PERINEURAL
Status: DISCONTINUED
Start: 2023-05-02 | End: 2023-05-02 | Stop reason: HOSPADM

## 2023-05-02 RX ADMIN — LIDOCAINE HYDROCHLORIDE 5 ML: 20 INJECTION, SOLUTION INTRAVENOUS at 07:05

## 2023-05-02 RX ADMIN — SODIUM CHLORIDE, SODIUM GLUCONATE, SODIUM ACETATE, POTASSIUM CHLORIDE AND MAGNESIUM CHLORIDE: 526; 502; 368; 37; 30 INJECTION, SOLUTION INTRAVENOUS at 07:05

## 2023-05-02 RX ADMIN — PROPOFOL 70 MG: 10 INJECTION, EMULSION INTRAVENOUS at 07:05

## 2023-05-02 RX ADMIN — PROPOFOL 30 MG: 10 INJECTION, EMULSION INTRAVENOUS at 07:05

## 2023-05-02 NOTE — PROVATION PATIENT INSTRUCTIONS
Discharge Summary/Instructions after an Endoscopic Procedure  Patient Name: Tiny Silverman  Patient MRN: [id number]  Patient YOB: 1960  Tuesday, May 2, 2023  Asif Byrnes MD  Dear patient,  As a result of recent federal legislation (The Federal Cures Act), you may   receive lab or pathology results from your procedure in your [facility]   account before your physician is able to contact you. Your physician or   their representative will relay the results to you with their   recommendations at their soonest availability.  Thank you,  RESTRICTIONS:  During your procedure today, you received medications for sedation.  These   medications may affect your judgment, balance and coordination.  Therefore,   for 24 hours, you have the following restrictions:   - DO NOT drive a car, operate machinery, make legal/financial decisions,   sign important papers or drink alcohol.    ACTIVITY:  Today: no heavy lifting, straining or running due to procedural   sedation/anesthesia.  The following day: return to full activity including work.  DIET:  Eat and drink normally unless instructed otherwise.     TREATMENT FOR COMMON SIDE EFFECTS:  - Mild abdominal pain, nausea, belching, bloating or excessive gas:  rest,   eat lightly and use a heating pad.  - Sore Throat: treat with throat lozenges and/or gargle with warm salt   water.  - Because air was used during the procedure, expelling large amounts of air   from your rectum or belching is normal.  - If a bowel prep was taken, you may not have a bowel movement for 1-3 days.    This is normal.  SYMPTOMS TO WATCH FOR AND REPORT TO YOUR PHYSICIAN:  1. Abdominal pain or bloating, other than gas cramps.  2. Chest pain.  3. Back pain.  4. Signs of infection such as: chills or fever occurring within 24 hours   after the procedure.  5. Rectal bleeding, which would show as bright red, maroon, or black stools.   (A tablespoon of blood from the rectum is not serious, especially if    hemorrhoids are present.)  6. Vomiting.  7. Weakness or dizziness.  GO DIRECTLY TO THE NEAREST EMERGENCY ROOM IF YOU HAVE ANY OF THE FOLLOWING:      Difficulty breathing              Chills and/or fever over 101 F   Persistent vomiting and/or vomiting blood   Severe abdominal pain   Severe chest pain   Black, tarry stools   Bleeding- more than one tablespoon   Any other symptom or condition that you feel may need urgent attention  Your doctor recommends these additional instructions:  If any biopsies were taken, your doctors clinic will contact you in 1 to 2   weeks with any results.  Recommend VCE to complete work up if negative hematology referral  Follow up pathology  Repeat colon in 5 years based on the size and number of polyps  For questions, problems or results please call your physician - Asif Byrnes MD at Work:  [phone].  [facility] EMERGENCY ROOM PHONE NUMBER: [phone]  IF A COMPLICATION OR EMERGENCY SITUATION ARISES AND YOU ARE UNABLE TO REACH   YOUR PHYSICIAN - GO DIRECTLY TO THE EMERGENCY ROOM.  MD Asif Hinds MD  5/2/2023 7:48:54 AM  This report has been verified and signed electronically.  Dear patient,  As a result of recent federal legislation (The Federal Cures Act), you may   receive lab or pathology results from your procedure in your [facility]   account before your physician is able to contact you. Your physician or   their representative will relay the results to you with their   recommendations at their soonest availability.  Thank you,  PROVATION

## 2023-05-02 NOTE — PROVATION PATIENT INSTRUCTIONS
Discharge Summary/Instructions after an Endoscopic Procedure  Patient Name: Tiny Silverman  Patient MRN: [id number]  Patient YOB: 1960  Tuesday, May 2, 2023  Asif Byrnes MD  Dear patient,  As a result of recent federal legislation (The Federal Cures Act), you may   receive lab or pathology results from your procedure in your [facility]   account before your physician is able to contact you. Your physician or   their representative will relay the results to you with their   recommendations at their soonest availability.  Thank you,  RESTRICTIONS:  During your procedure today, you received medications for sedation.  These   medications may affect your judgment, balance and coordination.  Therefore,   for 24 hours, you have the following restrictions:   - DO NOT drive a car, operate machinery, make legal/financial decisions,   sign important papers or drink alcohol.    ACTIVITY:  Today: no heavy lifting, straining or running due to procedural   sedation/anesthesia.  The following day: return to full activity including work.  DIET:  Eat and drink normally unless instructed otherwise.     TREATMENT FOR COMMON SIDE EFFECTS:  - Mild abdominal pain, nausea, belching, bloating or excessive gas:  rest,   eat lightly and use a heating pad.  - Sore Throat: treat with throat lozenges and/or gargle with warm salt   water.  - Because air was used during the procedure, expelling large amounts of air   from your rectum or belching is normal.  - If a bowel prep was taken, you may not have a bowel movement for 1-3 days.    This is normal.  SYMPTOMS TO WATCH FOR AND REPORT TO YOUR PHYSICIAN:  1. Abdominal pain or bloating, other than gas cramps.  2. Chest pain.  3. Back pain.  4. Signs of infection such as: chills or fever occurring within 24 hours   after the procedure.  5. Rectal bleeding, which would show as bright red, maroon, or black stools.   (A tablespoon of blood from the rectum is not serious, especially if    hemorrhoids are present.)  6. Vomiting.  7. Weakness or dizziness.  GO DIRECTLY TO THE NEAREST EMERGENCY ROOM IF YOU HAVE ANY OF THE FOLLOWING:      Difficulty breathing              Chills and/or fever over 101 F   Persistent vomiting and/or vomiting blood   Severe abdominal pain   Severe chest pain   Black, tarry stools   Bleeding- more than one tablespoon   Any other symptom or condition that you feel may need urgent attention  Your doctor recommends these additional instructions:  If any biopsies were taken, your doctors clinic will contact you in 1 to 2   weeks with any results.  Proceed with colon  follow up pathology  Daily ppi  For questions, problems or results please call your physician - Asif Byrnes MD at Work:  [phone].  [facility] [place], EMERGENCY ROOM PHONE NUMBER: [phone]  IF A COMPLICATION OR EMERGENCY SITUATION ARISES AND YOU ARE UNABLE TO REACH   YOUR PHYSICIAN - GO DIRECTLY TO THE EMERGENCY ROOM.  MD Asif Hinds MD  5/2/2023 7:45:33 AM  This report has been verified and signed electronically.  Dear patient,  As a result of recent federal legislation (The Federal Cures Act), you may   receive lab or pathology results from your procedure in your [facility]   account before your physician is able to contact you. Your physician or   their representative will relay the results to you with their   recommendations at their soonest availability.  Thank you,  PROVATION

## 2023-05-02 NOTE — DISCHARGE SUMMARY
[facility]  Discharge Note  Short Stay    Procedure(s) (LRB):  DOUBLE (N/A)  COLON (N/A)  ESOPHAGOGASTRODUODENOSCOPY (EGD)- DEVICE ASSISTED (N/A)      OUTCOME: Patient tolerated treatment/procedure well without complication and is now ready for discharge.    DISPOSITION: Home or Self Care    FINAL DIAGNOSIS:  <principal problem not specified>    FOLLOWUP: In clinic    DISCHARGE INSTRUCTIONS:  No discharge procedures on file.     TIME SPENT ON DISCHARGE: 22 minutes

## 2023-05-02 NOTE — H&P
Gastroenterology     CC:     HPI 62 y.o. female referred for evaluation of iron deficiency anemia.  She has some dysphagia symptoms but denies any melena or gross bleeding.  She does not have nausea, vomiting, epigastric pain or weight loss.  She states her bowels are regular since eating more beans , prior she was having mild constipation.  She had a colon in 2018 with polyps and egd with mild findings.  She is on omeprazole and her gerd is controlled.  Her lowest hemoglobin was around 8 or 9 and most recent is 11.  She is off her Brilinta.  She has received iv iron around 3 times    Past Medical History:   Diagnosis Date    Acid reflux     Angina pectoris     Anxiety and depression     Blockage of coronary artery of heart     CAD (coronary artery disease)     Claustrophobia     Claustrophobia     Mild    HLD (hyperlipidemia)     HTN (hypertension)     Kidney stones     Obesity, unspecified     CROW (obstructive sleep apnea)     PAD (peripheral artery disease)     Pain in right arm     Pancreatitis     Pancreatitis     Peripheral arterial disease     Personal history of colonic polyps 10/12/2018    Dr. Bonilla Zavala    Pseudoaneurysm     SOB (shortness of breath)     TIA (transient ischemic attack)     Weakness of right hand        Past Surgical History:   Procedure Laterality Date    APPENDECTOMY  1977    Application of short arm splint      Carotid Angiogram  08/24/2022    COLONOSCOPY W/ POLYPECTOMY  10/12/2018    Dr. Bonilla Zavala    CORONARY STENT PLACEMENT N/A     circumflex    Cyst of breast  2002    Dilation of Coronary Artery       ECTOPIC PREGNANCY SURGERY      Excision of Left Greater Saphenous  N/A     Fluroscopy of Left Heart Using Low Osmolar Contrast  N/A     HYSTERECTOMY  1998    Immobilzation of Right Lower Arm Using Splint      LAPAROSCOPIC CHOLECYSTECTOMY N/A     LAPAROSCOPIC TOTAL HYSTERECTOMY  07/26/1998    Laroscopic  N/A     Percutaneous Transluminal N/A     Resection of Gallbladder       TONSILLECTOMY      TRANSCAROTID ARTERY REVASCULARIZATION (TCAR) Right        Social History     Tobacco Use    Smoking status: Former     Packs/day: 1.50     Years: 40.00     Pack years: 60.00     Types: Cigarettes     Start date: 1975     Quit date: 2015     Years since quittin.8    Smokeless tobacco: Never    Tobacco comments:     Quit 2015   Substance Use Topics    Alcohol use: Never    Drug use: Never       Family History   Problem Relation Age of Onset    Cancer Mother          at 62    Hypertension Mother     Breast cancer Mother     Lung cancer Mother     Hypertension Father     Coronary artery disease Father     Early death Father          at 46    Heart disease Father     Stroke Sister     Hypertension Sister     Early death Sister          at 32    Hyperlipidemia Sister     Hypertension Brother     Coronary artery disease Brother         CABG    Heart disease Brother     Cancer Maternal Aunt     Early death Maternal Aunt          at 56    Heart disease Maternal Aunt     Heart disease Maternal Aunt     Hypertension Sister     Kidney disease Paternal Aunt        Review of Systems  General ROS: negative for - chills, fever or weight loss  Psychological ROS: negative for - hallucination, depression or suicidal ideation  Ophthalmic ROS: negative for - blurry vision, photophobia or eye pain  ENT ROS: negative for - epistaxis, sore throat or rhinorrhea  Respiratory ROS: no cough, shortness of breath, or wheezing  Cardiovascular ROS: no chest pain or dyspnea on exertion  Gastrointestinal ROS: nl  Genito-Urinary ROS: no dysuria, trouble voiding, or hematuria  Musculoskeletal ROS: negative for - gait disturbance or muscular weakness  Neurological ROS: no syncope or seizures; no ataxia  Dermatological ROS: negative for pruritis, rash and jaundice    Physical Examination  BP (!) 159/75 (BP Location: Left arm, Patient Position: Lying)   Pulse 75   Temp 97.3 °F (36.3 °C) (Tympanic)  "  Resp 20   Ht 5' 5" (1.651 m)   Wt 91.6 kg (202 lb)   SpO2 95%   Breastfeeding No   BMI 33.61 kg/m²   General appearance: alert, cooperative, no distress  HENT: Normocephalic, atraumatic, neck symmetrical, no nasal discharge   Eyes: conjunctivae/corneas clear, PERRL, EOM's intact  Lungs: clear to auscultation bilaterally, no dullness to percussion bilaterally  Heart: regular rate and rhythm without rub; no displacement of the PMI   Abdomen: nl  Extremities: extremities symmetric; no clubbing, cyanosis, or edema  Integument: Skin color, texture, turgor normal; no rashes; hair distrubution normal  Neurologic: Alert and oriented X 3, normal strength, normal coordination and gait  Psychiatric: no pressured speech; normal affect; no evidence of impaired cognition     Labs:  11 hgb    Imaging:    I have personally reviewed these images    Assessment:   Iron deficiency anemia with dysphagia and no gross bleeding    Plan:  Egd/colon if negative vce   "

## 2023-05-02 NOTE — TRANSFER OF CARE
"Anesthesia Transfer of Care Note    Patient: Tiny Silverman    Procedure(s) Performed: Procedure(s) (LRB):  DOUBLE (N/A)  COLON (N/A)  ESOPHAGOGASTRODUODENOSCOPY (EGD)- DEVICE ASSISTED (N/A)    Patient location: GI    Anesthesia Type: MAC    Transport from OR: Transported from OR on room air with adequate spontaneous ventilation    Post pain: adequate analgesia    Post assessment: no apparent anesthetic complications    Post vital signs: stable    Level of consciousness: awake, alert and oriented    Nausea/Vomiting: no nausea/vomiting    Complications: none    Transfer of care protocol was followed      Last vitals:   Visit Vitals  BP (!) 148/72 (BP Location: Left arm, Patient Position: Lying)   Pulse 88   Temp 36.5 °C (97.7 °F) (Skin)   Resp 18   Ht 5' 5" (1.651 m)   Wt 91.6 kg (202 lb)   SpO2 98%   Breastfeeding No   BMI 33.61 kg/m²     "

## 2023-05-02 NOTE — OR NURSING
Dr. Byrnes ordered M2A procedure post op after procedure. Spoke with patient and daughter everyone in agreement. MD stated he will put order for procedure in [facility]. SHELBY

## 2023-05-02 NOTE — ANESTHESIA PREPROCEDURE EVALUATION
05/02/2023  Tiny Silverman is a 62 y.o., female  Hx colon polyps and GERD  Pre-operative evaluation for Procedure(s) (LRB):  DOUBLE (N/A)  COLON (N/A)    Tiny Silverman is a 62 y.o. female Hx CAD, 4/21/2021 MICHELLE w PTCA  s/p Cor stent LCFx and CABG (transmypcardial revascularization) PAD: TIA vision wax n wane and memory deficits=  Carotid Dz  s/p R Carotid stent TCAR 10/2022; HTN, HLD, CROW. PAD L leg,     Patient Active Problem List   Diagnosis    Iron deficiency anemia    Anxiety and depression    Bradycardia    Dyspnea    HTN (hypertension)    HLD (hyperlipidemia)    PAD (peripheral artery disease)    CAD (coronary artery disease)    Acute pancreatitis    Back pain    Paresthesias    Lumbar back pain with radiculopathy affecting left lower extremity    Iron deficiency anemia secondary to inadequate dietary iron intake       Review of patient's allergies indicates:   Allergen Reactions    Penicillins      Other reaction(s): was uanable to arouse       No current facility-administered medications on file prior to encounter.     Current Outpatient Medications on File Prior to Encounter   Medication Sig Dispense Refill    atorvastatin (LIPITOR) 40 MG tablet Take 40 mg by mouth once daily.      busPIRone (BUSPAR) 5 MG Tab Take 1 tablet (5 mg total) by mouth 2 (two) times daily. 180 tablet 3    EScitalopram oxalate (LEXAPRO) 20 MG tablet Take 1 tablet (20 mg total) by mouth every evening. 90 tablet 3    ezetimibe (ZETIA) 10 mg tablet ezetimibe 10 mg tablet      fluticasone propionate (FLONASE) 50 mcg/actuation nasal spray 1 spray (50 mcg total) by Each Nostril route 2 (two) times a day. 16 g 3    nitroGLYCERIN (NITROSTAT) 0.4 MG SL tablet nitroglycerin 0.4 mg sublingual tablet   TAKE 1 TABLET UNDER THE TONGUE ONCE A DAY AS NEEDED FOR CHEST PAIN      ranolazine (RANEXA) 1,000 mg Tb12  ranolazine ER 1,000 mg tablet,extended release,12 hr   TAKE 1 TABLET BY MOUTH TWICE A DAY      aspirin (ECOTRIN) 81 MG EC tablet aspirin 81 mg tablet,delayed release   Take 1 tablet every day by oral route.      cetirizine (ZYRTEC) 10 MG tablet Take 1 tablet (10 mg total) by mouth once daily. (Patient not taking: Reported on 5/1/2023) 90 tablet 3    ticagrelor (BRILINTA) 90 mg tablet Brilinta 90 mg tablet         Past Surgical History:   Procedure Laterality Date    APPENDECTOMY  1977    Application of short arm splint      Carotid Angiogram  08/24/2022    COLONOSCOPY W/ POLYPECTOMY  10/12/2018    Dr. Bonilla Zavala    CORONARY STENT PLACEMENT N/A     circumflex    Cyst of breast  2002    Dilation of Coronary Artery       ECTOPIC PREGNANCY SURGERY      Excision of Left Greater Saphenous  N/A     Fluroscopy of Left Heart Using Low Osmolar Contrast  N/A     HYSTERECTOMY  1998    Immobilzation of Right Lower Arm Using Splint      LAPAROSCOPIC CHOLECYSTECTOMY N/A     LAPAROSCOPIC TOTAL HYSTERECTOMY  07/26/1998    Laroscopic  N/A     Percutaneous Transluminal N/A     Resection of Gallbladder      TONSILLECTOMY  1962    TRANSCAROTID ARTERY REVASCULARIZATION (TCAR) Right          CBC 4/12/2023: H/H=12/38.   No results for input(s): WBC, RBC, HGB, HCT, PLT, MCV, MCH, MCHC in the last 72 hours.    CMP: No results for input(s): NA, K, CL, CO2, BUN, CREATININE, GLU, MG, PHOS, CALCIUM, ALBUMIN, PROT, ALKPHOS, ALT, AST, BILITOT in the last 72 hours.    INR  No results for input(s): PT, INR, PROTIME, APTT in the last 72 hours.        Diagnostic Studies:  CXR :    EKG 2/7/2023 : NSR=65. NSTW abn. Vs 12/9/2022 NSTW changes in ant leads more prominent.      2D Echo 2/5/2021: EF=55%. Mild AI/MR. TR RVSP=27. Trace TR/PI. Mild LA dilated.     Carotid Cath 10//[id number]: S/p TCAR R: VAL from 80% to 0% revascularization.                      Pre-op Assessment    I have reviewed the Patient Summary Reports.     I have  reviewed the Nursing Notes. I have reviewed the NPO Status.      Review of Systems  Anesthesia Hx:  No problems with previous Anesthesia  History of prior surgery of interest to airway management or planning: heart surgery. Previous anesthesia: General 10/5/2022 Carotids TCAR: Mil2,Gr1, ET7.5; 2017Transmyocardial revascularization Cor vessel:;  Lap hysterectomy: ; last 4/21/2021 Cardiac stent:; Appy:;  with general anesthesia.  Procedure performed at an [facility]. Airway issues documented on chart review include GETA, mask, easy, easy direct laryngoscopy , view on direct laryngoscopy Grade I  Denies Family Hx of Anesthesia complications.   Denies Personal Hx of Anesthesia complications.   Social:  Former Smoker, No Alcohol Use [id number] yrs. Quit 2015.   Cardiovascular:   Hypertension Valvular problems/Murmurs, MR, AI CAD  CABG/stent  Angina, at rest PVD hyperlipidemia ECG has been reviewed. CAD s/p Stent LCFx 4/21/2021;  and 2017CABG (transmyocardial revascularization)   Carotid Art Dz:  TIA vision wax n wane and memory deficits=  Carotid Dz: LICA 50%.  s/p R Carotid stent TCAR 10/2/2022, post stent VAL=0%.  Echo 2/5/2021: EF=55%. Mild AI/MR. TR RVSP=27. Trace TR/PI. Mild LA dilated.  C 4/21/2021:    Pulmonary:   Shortness of breath Sleep Apnea, CPAP Noncompliant CPAP: recall and throat dry.   Renal/:   renal calculi    Hepatic/GI:   Bowel Prep. GERD    Musculoskeletal:  Spine Disorders: lumbar Chronic Pain    Neurological:   TIA, With Lumbar radiculopathy.   Carotid Art Dz: TIA vision wax n wane and memory deficits=  Carotid Dz: LICA 50%.   s/p R Carotid stent TCAR 10/2/2022: successful intervention:0%.   Psych:   anxiety depression claustrophobic         Physical Exam  General: Alert and Oriented    Airway:  Mallampati: III / III  Mouth Opening: Small, but > 3cm  TM Distance: 4 - 6 cm  Tongue: Normal  Neck ROM: Normal ROM  V short MHyoid distance. Nil.  Dental:  Intact  Px denies any loose  teeth.  Chest/Lungs:  Clear to auscultation, Normal Respiratory Rate    Heart:  Rate: Normal  Rhythm: Regular Rhythm        Anesthesia Plan  Type of Anesthesia, risks & benefits discussed:    Anesthesia Type: Gen Natural Airway  Intra-op Monitoring Plan: Standard ASA Monitors  Induction:  IV  Informed Consent: Informed consent signed with the Patient and all parties understand the risks and agree with anesthesia plan.  All questions answered.   ASA Score: 4  Day of Surgery Review of History & Physical: H&P Update referred to the surgeon/provider.I have interviewed and examined the patient. I have reviewed the patient's H&P dated:   Anesthesia Plan Notes: TIVA with mask/NC, GA back-up.     Ready For Surgery From Anesthesia Perspective.     .

## 2023-05-02 NOTE — ANESTHESIA POSTPROCEDURE EVALUATION
Anesthesia Post Evaluation    Patient: Tiny Silverman    Procedure(s) Performed: Procedure(s) (LRB):  DOUBLE (N/A)  COLON (N/A)  ESOPHAGOGASTRODUODENOSCOPY (EGD)- DEVICE ASSISTED (N/A)    Final Anesthesia Type: general (-NAtural AW)      Patient location during evaluation: GI PACU  Patient participation: Yes- Able to Participate  Level of consciousness: awake and alert, awake and oriented  Post-procedure vital signs: reviewed and stable  Pain management: adequate  Airway patency: patent    PONV status at discharge: No PONV  Anesthetic complications: no      Cardiovascular status: blood pressure returned to baseline, hemodynamically stable and stable  Respiratory status: unassisted, spontaneous ventilation and room air  Hydration status: euvolemic  Follow-up not needed.          Vitals Value Taken Time   /69 05/02/23 0833   Temp 36.6 °C (97.9 °F) 05/02/23 0743   Pulse 72 05/02/23 0833   Resp 17 05/02/23 0833   SpO2 98 % 05/02/23 0833         No case tracking events are documented in the log.      Pain/Sheree Score: Sheree Score: 10 (5/2/2023  9:00 AM)

## 2023-05-03 ENCOUNTER — LAB VISIT (OUTPATIENT)
Dept: LAB | Facility: HOSPITAL | Age: 63
End: 2023-05-03
Attending: INTERNAL MEDICINE
Payer: COMMERCIAL

## 2023-05-03 ENCOUNTER — PATIENT MESSAGE (OUTPATIENT)
Dept: ADMINISTRATIVE | Facility: OTHER | Age: 63
End: 2023-05-03
Payer: COMMERCIAL

## 2023-05-03 DIAGNOSIS — D50.8 IRON DEFICIENCY ANEMIA SECONDARY TO INADEQUATE DIETARY IRON INTAKE: ICD-10-CM

## 2023-05-03 LAB
FERRITIN SERPL-MCNC: 344.99 NG/ML (ref 4.63–204)
IRON SATN MFR SERPL: 36 % (ref 20–50)
IRON SERPL-MCNC: 88 UG/DL (ref 50–170)
PSYCHE PATHOLOGY RESULT: NORMAL
TIBC SERPL-MCNC: 156 UG/DL (ref 70–310)
TIBC SERPL-MCNC: 244 UG/DL (ref 250–450)
TRANSFERRIN SERPL-MCNC: 204 MG/DL (ref 173–360)

## 2023-05-03 PROCEDURE — 36415 COLL VENOUS BLD VENIPUNCTURE: CPT

## 2023-05-03 PROCEDURE — 82728 ASSAY OF FERRITIN: CPT

## 2023-05-03 PROCEDURE — 83550 IRON BINDING TEST: CPT

## 2023-05-04 ENCOUNTER — PATIENT MESSAGE (OUTPATIENT)
Dept: ADMINISTRATIVE | Facility: OTHER | Age: 63
End: 2023-05-04
Payer: COMMERCIAL

## 2023-05-05 ENCOUNTER — TELEPHONE (OUTPATIENT)
Dept: INTERNAL MEDICINE | Facility: CLINIC | Age: 63
End: 2023-05-05
Payer: COMMERCIAL

## 2023-05-05 NOTE — TELEPHONE ENCOUNTER
----- Message from Bonilla Robles MD sent at 5/4/2023  7:15 PM CDT -----  Regarding: FW:  Iron studies are stable at current recommend follow up with hematology as previously discussed  ----- Message -----  From: Background User Lab  Sent: 5/3/2023   8:04 AM CDT  To: Bonilla Robles MD

## 2023-05-08 ENCOUNTER — HOSPITAL ENCOUNTER (OUTPATIENT)
Dept: RADIOLOGY | Facility: HOSPITAL | Age: 63
Discharge: HOME | End: 2023-05-08
Attending: STUDENT IN AN ORGANIZED HEALTH CARE EDUCATION/TRAINING PROGRAM
Payer: COMMERCIAL

## 2023-05-08 DIAGNOSIS — R10.13 EPIGASTRIC PAIN: ICD-10-CM

## 2023-05-08 DIAGNOSIS — R14.0 ABDOMINAL DISTENTION: ICD-10-CM

## 2023-05-08 LAB
CREAT SERPL-MCNC: 0.9 MG/DL (ref 0.5–1.4)
SAMPLE: NORMAL

## 2023-05-08 PROCEDURE — 25500020 PHARM REV CODE 255: Performed by: STUDENT IN AN ORGANIZED HEALTH CARE EDUCATION/TRAINING PROGRAM

## 2023-05-08 PROCEDURE — 74177 CT ABD & PELVIS W/CONTRAST: CPT | Mod: TC

## 2023-05-08 RX ADMIN — IOPAMIDOL 100 ML: 755 INJECTION, SOLUTION INTRAVENOUS at 08:05

## 2023-05-11 DIAGNOSIS — R10.13 EPIGASTRIC PAIN: ICD-10-CM

## 2023-05-11 DIAGNOSIS — R14.0 ABDOMINAL DISTENTION: ICD-10-CM

## 2023-05-11 DIAGNOSIS — D50.8 IRON DEFICIENCY ANEMIA SECONDARY TO INADEQUATE DIETARY IRON INTAKE: Primary | ICD-10-CM

## 2023-05-11 NOTE — PROGRESS NOTES
Subjective:       Patient ID: Tiny Silverman is a 62 y.o. female.    Chief Complaint: Follow up    Diagnosis: Iron Deficiency Anemia    Current Treatment: Oral Iron Supplements    Treatment History:   Injectafer x2 March '23    HPI:   63yo F who presents in April '23 for evaluation of anemia. Mild anemia first really noted in December '22 when her hemoglobin was average around 11, however in February '23 her hemoglobin dropped to approximately 9. Workup into her anemia revealed evidence of iron deficiency anemia with iron level of 6 and Ferritin of 7. She was given Injectafer x2 by per PCP in March '22. B12 and folate were normal. FOBT was negative, however she is scheduled to undergo colonoscopy in the coming months. She was referred to hematology for further evaluation.     Today she complains of increased fatigue over the last 3 months. She denies any evidence of bleeding. She also notes increased abdominal distention over the last month to the point she can no longer fit in her clothes as well as a 20lb weight gain over the last 2-3 weeks.     Interval History:  Patient presents to clinic for 4 week MD follow up appointment with labs to discuss scan results.   She reports feeling much better since last visit. She denies any further bleeding.   Had full GI evaluation with EGD, Colonoscopy, and small bowel pill cam that was unrevealing.   Labs look good today, anemia resolved.   She denies any fatigue, shortness of breath, unintentional weight loss.       Past Medical History:   Diagnosis Date    Acid reflux     Angina pectoris     Anxiety and depression     Blockage of coronary artery of heart     CAD (coronary artery disease)     Claustrophobia     Claustrophobia     Mild    HLD (hyperlipidemia)     HTN (hypertension)     Kidney stones     Obesity, unspecified     CROW (obstructive sleep apnea)     PAD (peripheral artery disease)     Pain in right arm     Pancreatitis     Pancreatitis     Peripheral arterial  disease     Personal history of colonic polyps 10/12/2018    Dr. Bonilla Zavala    Pseudoaneurysm     SOB (shortness of breath)     TIA (transient ischemic attack)     Weakness of right hand       Past Surgical History:   Procedure Laterality Date    APPENDECTOMY      Application of short arm splint      Carotid Angiogram  2022    COLONOSCOPY W/ POLYPECTOMY  10/12/2018    Dr. Bonilla Zavala    COLONOSCOPY, WITH 1 OR MORE BIOPSIES N/A 2023    Procedure: COLON;  Surgeon: Asif Byrnes MD;  Location: [facility] ENDOSCOPY;  Service: Gastroenterology;  Laterality: N/A;    CORONARY STENT PLACEMENT N/A     circumflex    Cyst of breast      Dilation of Coronary Artery       ECTOPIC PREGNANCY SURGERY      EGD, WITH CLOSED BIOPSY N/A 2023    Procedure: DOUBLE;  Surgeon: Asif Byrnes MD;  Location: [facility] ENDOSCOPY;  Service: Gastroenterology;  Laterality: N/A;    ESOPHAGOGASTRODUODENOSCOPY (EGD)- DEVICE ASSISTED N/A 2023    Procedure: ESOPHAGOGASTRODUODENOSCOPY (EGD)- DEVICE ASSISTED;  Surgeon: Asif Byrnes MD;  Location: [facility] ENDOSCOPY;  Service: Gastroenterology;  Laterality: N/A;  52 Fr Malony dilation    Excision of Left Greater Saphenous  N/A     Fluroscopy of Left Heart Using Low Osmolar Contrast  N/A     HYSTERECTOMY      Immobilzation of Right Lower Arm Using Splint      LAPAROSCOPIC CHOLECYSTECTOMY N/A     LAPAROSCOPIC TOTAL HYSTERECTOMY  1998    Laroscopic  N/A     Percutaneous Transluminal N/A     Resection of Gallbladder      TONSILLECTOMY      TRANSCAROTID ARTERY REVASCULARIZATION (TCAR) Right      Social History     Socioeconomic History    Marital status: Single   Tobacco Use    Smoking status: Former     Packs/day: 1.50     Years: 40.00     Pack years: 60.00     Types: Cigarettes     Start date: 1975     Quit date: 2015     Years since quittin.8    Smokeless tobacco: Never    Tobacco comments:     Quit 2015   Substance  and Sexual Activity    Alcohol use: Never    Drug use: Never    Sexual activity: Not Currently     Partners: Male     Birth control/protection: Abstinence     Social Determinants of Health     Financial Resource Strain: Low Risk     Difficulty of Paying Living Expenses: Not very hard   Food Insecurity: No Food Insecurity    Worried About Running Out of Food in the Last Year: Never true    Ran Out of Food in the Last Year: Never true   Transportation Needs: No Transportation Needs    Lack of Transportation (Medical): No    Lack of Transportation (Non-Medical): No   Physical Activity: Inactive    Days of Exercise per Week: 0 days    Minutes of Exercise per Session: 0 min   Stress: No Stress Concern Present    Feeling of Stress : Only a little   Social Connections: Unknown    Frequency of Communication with Friends and Family: Three times a week    Frequency of Social Gatherings with Friends and Family: Never    Active Member of Clubs or Organizations: No    Attends Club or Organization Meetings: Patient refused    Marital Status:    Housing Stability: Low Risk     Unable to Pay for Housing in the Last Year: No    Number of Places Lived in the Last Year: 2    Unstable Housing in the Last Year: No      Family History   Problem Relation Age of Onset    Cancer Mother          at 62    Hypertension Mother     Breast cancer Mother     Lung cancer Mother     Hypertension Father     Coronary artery disease Father     Early death Father          at 46    Heart disease Father     Stroke Sister     Hypertension Sister     Early death Sister          at 32    Hyperlipidemia Sister     Hypertension Brother     Coronary artery disease Brother         CABG    Heart disease Brother     Cancer Maternal Aunt     Early death Maternal Aunt          at 56    Heart disease Maternal Aunt     Heart disease Maternal Aunt     Hypertension Sister     Kidney disease Paternal Aunt       Review of patient's allergies  indicates:   Allergen Reactions    Penicillins      Other reaction(s): was uanable to arouse      Review of Systems   Constitutional:  Positive for unexpected weight change. Negative for appetite change.   HENT:  Negative for mouth sores.    Eyes:  Negative for visual disturbance.   Respiratory:  Positive for shortness of breath. Negative for cough.    Cardiovascular:  Positive for chest pain.   Gastrointestinal:  Positive for abdominal pain. Negative for diarrhea.   Genitourinary:  Positive for frequency.   Musculoskeletal:  Positive for back pain.   Integumentary:  Negative for rash.   Neurological:  Positive for headaches.   Hematological:  Negative for adenopathy.   Psychiatric/Behavioral:  The patient is nervous/anxious.        Objective:      Vitals:    05/15/23 1305   BP: (!) 145/80   Pulse: 72   Resp: 18   Temp: 97.7 °F (36.5 °C)         Physical Exam  Constitutional:       General: She is not in acute distress.     Appearance: Normal appearance. She is not ill-appearing.   HENT:      Head: Normocephalic and atraumatic.      Nose: Nose normal.      Mouth/Throat:      Mouth: Mucous membranes are moist.      Pharynx: Oropharynx is clear.   Eyes:      Extraocular Movements: Extraocular movements intact.      Conjunctiva/sclera: Conjunctivae normal.      Pupils: Pupils are equal, round, and reactive to light.   Cardiovascular:      Rate and Rhythm: Normal rate and regular rhythm.      Pulses: Normal pulses.      Heart sounds: Normal heart sounds. No murmur heard.  Pulmonary:      Effort: Pulmonary effort is normal. No respiratory distress.      Breath sounds: Normal breath sounds.   Abdominal:      Palpations: Abdomen is soft.   Musculoskeletal:         General: Normal range of motion.      Cervical back: Normal range of motion and neck supple.      Right lower leg: No edema.      Left lower leg: No edema.   Lymphadenopathy:      Cervical: No cervical adenopathy.   Skin:     General: Skin is warm and dry.    Neurological:      General: No focal deficit present.      Mental Status: She is alert and oriented to person, place, and time.       LABS AND IMAGING REVIEWED IN EPIC    5/8/23 CT Abdomen/Pelvis  Impression:   No acute abnormality of the abdomen and pelvis.    3/28/23 Bilateral Mammogram w/Kuldip  IMPRESSION: NEGATIVE  There is no mammographic evidence of malignancy.     Assessment:   Iron deficiency Anemia - Noted at the end of '22/beginning of '23. S/p IV iron infusions in March '23. Planned GI evaluation in Summer '23. No other etiology found to have contribute to her recent acute on chronic anemia which is now resolved.         Plan:       - Anemia now resolved, will monitor for worsening iron deficiency  - RTC 3 months for MD visit, labs 3 days prior - CBC, CMP, Iron, Ferritin      Elizabeth Kennedy LeJeune, MD  Hematology/Oncology   [facility]

## 2023-05-15 ENCOUNTER — LAB VISIT (OUTPATIENT)
Dept: LAB | Facility: HOSPITAL | Age: 63
End: 2023-05-15
Attending: INTERNAL MEDICINE
Payer: COMMERCIAL

## 2023-05-15 ENCOUNTER — OFFICE VISIT (OUTPATIENT)
Dept: HEMATOLOGY/ONCOLOGY | Facility: CLINIC | Age: 63
End: 2023-05-15
Payer: COMMERCIAL

## 2023-05-15 VITALS
HEIGHT: 65 IN | TEMPERATURE: 98 F | DIASTOLIC BLOOD PRESSURE: 80 MMHG | WEIGHT: 200.81 LBS | SYSTOLIC BLOOD PRESSURE: 145 MMHG | HEART RATE: 72 BPM | OXYGEN SATURATION: 97 % | BODY MASS INDEX: 33.46 KG/M2 | RESPIRATION RATE: 18 BRPM

## 2023-05-15 DIAGNOSIS — D50.8 IRON DEFICIENCY ANEMIA SECONDARY TO INADEQUATE DIETARY IRON INTAKE: Primary | ICD-10-CM

## 2023-05-15 DIAGNOSIS — R10.13 EPIGASTRIC PAIN: ICD-10-CM

## 2023-05-15 DIAGNOSIS — R14.0 ABDOMINAL DISTENTION: ICD-10-CM

## 2023-05-15 DIAGNOSIS — D50.8 IRON DEFICIENCY ANEMIA SECONDARY TO INADEQUATE DIETARY IRON INTAKE: ICD-10-CM

## 2023-05-15 LAB
ALBUMIN SERPL-MCNC: 4 G/DL (ref 3.4–4.8)
ALBUMIN/GLOB SERPL: 1.7 RATIO (ref 1.1–2)
ALP SERPL-CCNC: 100 UNIT/L (ref 40–150)
ALT SERPL-CCNC: 59 UNIT/L (ref 0–55)
AST SERPL-CCNC: 48 UNIT/L (ref 5–34)
BASOPHILS # BLD AUTO: 0.02 X10(3)/MCL
BASOPHILS NFR BLD AUTO: 0.3 %
BILIRUBIN DIRECT+TOT PNL SERPL-MCNC: 0.7 MG/DL
BUN SERPL-MCNC: 11.4 MG/DL (ref 9.8–20.1)
CALCIUM SERPL-MCNC: 9.7 MG/DL (ref 8.4–10.2)
CHLORIDE SERPL-SCNC: 105 MMOL/L (ref 98–107)
CO2 SERPL-SCNC: 26 MMOL/L (ref 23–31)
CREAT SERPL-MCNC: 0.81 MG/DL (ref 0.55–1.02)
EOSINOPHIL # BLD AUTO: 0.17 X10(3)/MCL (ref 0–0.9)
EOSINOPHIL NFR BLD AUTO: 2.9 %
ERYTHROCYTE [DISTWIDTH] IN BLOOD BY AUTOMATED COUNT: 18.1 % (ref 11.5–17)
GFR SERPLBLD CREATININE-BSD FMLA CKD-EPI: >60 MLS/MIN/1.73/M2
GLOBULIN SER-MCNC: 2.4 GM/DL (ref 2.4–3.5)
GLUCOSE SERPL-MCNC: 85 MG/DL (ref 82–115)
HCT VFR BLD AUTO: 42.1 % (ref 37–47)
HGB BLD-MCNC: 13.4 G/DL (ref 12–16)
IMM GRANULOCYTES # BLD AUTO: 0.01 X10(3)/MCL (ref 0–0.04)
IMM GRANULOCYTES NFR BLD AUTO: 0.2 %
LYMPHOCYTES # BLD AUTO: 1.71 X10(3)/MCL (ref 0.6–4.6)
LYMPHOCYTES NFR BLD AUTO: 29.2 %
MCH RBC QN AUTO: 28.8 PG (ref 27–31)
MCHC RBC AUTO-ENTMCNC: 31.8 G/DL (ref 33–36)
MCV RBC AUTO: 90.5 FL (ref 80–94)
MONOCYTES # BLD AUTO: 0.57 X10(3)/MCL (ref 0.1–1.3)
MONOCYTES NFR BLD AUTO: 9.7 %
NEUTROPHILS # BLD AUTO: 3.38 X10(3)/MCL (ref 2.1–9.2)
NEUTROPHILS NFR BLD AUTO: 57.7 %
PLATELET # BLD AUTO: 217 X10(3)/MCL (ref 130–400)
PMV BLD AUTO: 9.9 FL (ref 7.4–10.4)
POTASSIUM SERPL-SCNC: 3.9 MMOL/L (ref 3.5–5.1)
PROT SERPL-MCNC: 6.4 GM/DL (ref 5.8–7.6)
RBC # BLD AUTO: 4.65 X10(6)/MCL (ref 4.2–5.4)
SODIUM SERPL-SCNC: 139 MMOL/L (ref 136–145)
WBC # SPEC AUTO: 5.86 X10(3)/MCL (ref 4.5–11.5)

## 2023-05-15 PROCEDURE — 99999 PR PBB SHADOW E&M-EST. PATIENT-LVL IV: CPT | Mod: PBBFAC,,, | Performed by: STUDENT IN AN ORGANIZED HEALTH CARE EDUCATION/TRAINING PROGRAM

## 2023-05-15 PROCEDURE — 99999 PR PBB SHADOW E&M-EST. PATIENT-LVL IV: ICD-10-PCS | Mod: PBBFAC,,, | Performed by: STUDENT IN AN ORGANIZED HEALTH CARE EDUCATION/TRAINING PROGRAM

## 2023-05-15 PROCEDURE — 1160F PR REVIEW ALL MEDS BY PRESCRIBER/CLIN PHARMACIST DOCUMENTED: ICD-10-PCS | Mod: CPTII,S$GLB,, | Performed by: STUDENT IN AN ORGANIZED HEALTH CARE EDUCATION/TRAINING PROGRAM

## 2023-05-15 PROCEDURE — 1159F MED LIST DOCD IN RCRD: CPT | Mod: CPTII,S$GLB,, | Performed by: STUDENT IN AN ORGANIZED HEALTH CARE EDUCATION/TRAINING PROGRAM

## 2023-05-15 PROCEDURE — 1159F PR MEDICATION LIST DOCUMENTED IN MEDICAL RECORD: ICD-10-PCS | Mod: CPTII,S$GLB,, | Performed by: STUDENT IN AN ORGANIZED HEALTH CARE EDUCATION/TRAINING PROGRAM

## 2023-05-15 PROCEDURE — 99213 PR OFFICE/OUTPT VISIT, EST, LEVL III, 20-29 MIN: ICD-10-PCS | Mod: S$GLB,,, | Performed by: STUDENT IN AN ORGANIZED HEALTH CARE EDUCATION/TRAINING PROGRAM

## 2023-05-15 PROCEDURE — 85025 COMPLETE CBC W/AUTO DIFF WBC: CPT

## 2023-05-15 PROCEDURE — 80053 COMPREHEN METABOLIC PANEL: CPT

## 2023-05-15 PROCEDURE — 99213 OFFICE O/P EST LOW 20 MIN: CPT | Mod: S$GLB,,, | Performed by: STUDENT IN AN ORGANIZED HEALTH CARE EDUCATION/TRAINING PROGRAM

## 2023-05-15 PROCEDURE — 3079F DIAST BP 80-89 MM HG: CPT | Mod: CPTII,S$GLB,, | Performed by: STUDENT IN AN ORGANIZED HEALTH CARE EDUCATION/TRAINING PROGRAM

## 2023-05-15 PROCEDURE — 3077F SYST BP >= 140 MM HG: CPT | Mod: CPTII,S$GLB,, | Performed by: STUDENT IN AN ORGANIZED HEALTH CARE EDUCATION/TRAINING PROGRAM

## 2023-05-15 PROCEDURE — 3008F BODY MASS INDEX DOCD: CPT | Mod: CPTII,S$GLB,, | Performed by: STUDENT IN AN ORGANIZED HEALTH CARE EDUCATION/TRAINING PROGRAM

## 2023-05-15 PROCEDURE — 3008F PR BODY MASS INDEX (BMI) DOCUMENTED: ICD-10-PCS | Mod: CPTII,S$GLB,, | Performed by: STUDENT IN AN ORGANIZED HEALTH CARE EDUCATION/TRAINING PROGRAM

## 2023-05-15 PROCEDURE — 1160F RVW MEDS BY RX/DR IN RCRD: CPT | Mod: CPTII,S$GLB,, | Performed by: STUDENT IN AN ORGANIZED HEALTH CARE EDUCATION/TRAINING PROGRAM

## 2023-05-15 PROCEDURE — 3077F PR MOST RECENT SYSTOLIC BLOOD PRESSURE >= 140 MM HG: ICD-10-PCS | Mod: CPTII,S$GLB,, | Performed by: STUDENT IN AN ORGANIZED HEALTH CARE EDUCATION/TRAINING PROGRAM

## 2023-05-15 PROCEDURE — 36415 COLL VENOUS BLD VENIPUNCTURE: CPT

## 2023-05-15 PROCEDURE — 3079F PR MOST RECENT DIASTOLIC BLOOD PRESSURE 80-89 MM HG: ICD-10-PCS | Mod: CPTII,S$GLB,, | Performed by: STUDENT IN AN ORGANIZED HEALTH CARE EDUCATION/TRAINING PROGRAM

## 2023-05-15 RX ORDER — ISOSORBIDE MONONITRATE 20 MG/1
20 TABLET ORAL 2 TIMES DAILY
COMMUNITY
Start: 2023-05-09

## 2023-08-04 ENCOUNTER — LAB VISIT (OUTPATIENT)
Dept: LAB | Facility: HOSPITAL | Age: 63
End: 2023-08-04
Attending: INTERNAL MEDICINE
Payer: COMMERCIAL

## 2023-08-04 ENCOUNTER — TELEPHONE (OUTPATIENT)
Dept: INTERNAL MEDICINE | Facility: CLINIC | Age: 63
End: 2023-08-04
Payer: COMMERCIAL

## 2023-08-04 DIAGNOSIS — D50.8 IRON DEFICIENCY ANEMIA SECONDARY TO INADEQUATE DIETARY IRON INTAKE: ICD-10-CM

## 2023-08-04 DIAGNOSIS — F41.9 ANXIETY: ICD-10-CM

## 2023-08-04 DIAGNOSIS — J01.00 ACUTE NON-RECURRENT MAXILLARY SINUSITIS: Primary | ICD-10-CM

## 2023-08-04 LAB
ALBUMIN SERPL-MCNC: 4 G/DL (ref 3.4–4.8)
ALBUMIN/GLOB SERPL: 1.5 RATIO (ref 1.1–2)
ALP SERPL-CCNC: 114 UNIT/L (ref 40–150)
ALT SERPL-CCNC: 26 UNIT/L (ref 0–55)
AST SERPL-CCNC: 31 UNIT/L (ref 5–34)
BASOPHILS # BLD AUTO: 0.04 X10(3)/MCL
BASOPHILS NFR BLD AUTO: 0.6 %
BILIRUBIN DIRECT+TOT PNL SERPL-MCNC: 0.6 MG/DL
BUN SERPL-MCNC: 14.6 MG/DL (ref 9.8–20.1)
CALCIUM SERPL-MCNC: 9.5 MG/DL (ref 8.4–10.2)
CHLORIDE SERPL-SCNC: 100 MMOL/L (ref 98–107)
CO2 SERPL-SCNC: 31 MMOL/L (ref 23–31)
CREAT SERPL-MCNC: 0.88 MG/DL (ref 0.55–1.02)
EOSINOPHIL # BLD AUTO: 0.19 X10(3)/MCL (ref 0–0.9)
EOSINOPHIL NFR BLD AUTO: 2.8 %
ERYTHROCYTE [DISTWIDTH] IN BLOOD BY AUTOMATED COUNT: 12.8 % (ref 11.5–17)
FERRITIN SERPL-MCNC: 104.5 NG/ML (ref 4.63–204)
GFR SERPLBLD CREATININE-BSD FMLA CKD-EPI: >60 MLS/MIN/1.73/M2
GLOBULIN SER-MCNC: 2.6 GM/DL (ref 2.4–3.5)
GLUCOSE SERPL-MCNC: 81 MG/DL (ref 82–115)
HCT VFR BLD AUTO: 40.6 % (ref 37–47)
HGB BLD-MCNC: 13.2 G/DL (ref 12–16)
IMM GRANULOCYTES # BLD AUTO: 0.01 X10(3)/MCL (ref 0–0.04)
IMM GRANULOCYTES NFR BLD AUTO: 0.1 %
IRON SATN MFR SERPL: 32 % (ref 20–50)
IRON SERPL-MCNC: 78 UG/DL (ref 50–170)
LYMPHOCYTES # BLD AUTO: 2.44 X10(3)/MCL (ref 0.6–4.6)
LYMPHOCYTES NFR BLD AUTO: 36.6 %
MCH RBC QN AUTO: 30.7 PG (ref 27–31)
MCHC RBC AUTO-ENTMCNC: 32.5 G/DL (ref 33–36)
MCV RBC AUTO: 94.4 FL (ref 80–94)
MONOCYTES # BLD AUTO: 0.71 X10(3)/MCL (ref 0.1–1.3)
MONOCYTES NFR BLD AUTO: 10.6 %
NEUTROPHILS # BLD AUTO: 3.28 X10(3)/MCL (ref 2.1–9.2)
NEUTROPHILS NFR BLD AUTO: 49.3 %
PLATELET # BLD AUTO: 257 X10(3)/MCL (ref 130–400)
PMV BLD AUTO: 11.1 FL (ref 7.4–10.4)
POTASSIUM SERPL-SCNC: 3.5 MMOL/L (ref 3.5–5.1)
PROT SERPL-MCNC: 6.6 GM/DL (ref 5.8–7.6)
RBC # BLD AUTO: 4.3 X10(6)/MCL (ref 4.2–5.4)
SODIUM SERPL-SCNC: 143 MMOL/L (ref 136–145)
TIBC SERPL-MCNC: 169 UG/DL (ref 70–310)
TIBC SERPL-MCNC: 247 UG/DL (ref 250–450)
TRANSFERRIN SERPL-MCNC: 213 MG/DL (ref 173–360)
WBC # SPEC AUTO: 6.67 X10(3)/MCL (ref 4.5–11.5)

## 2023-08-04 PROCEDURE — 82728 ASSAY OF FERRITIN: CPT

## 2023-08-04 PROCEDURE — 83550 IRON BINDING TEST: CPT

## 2023-08-04 PROCEDURE — 85025 COMPLETE CBC W/AUTO DIFF WBC: CPT

## 2023-08-04 PROCEDURE — 80053 COMPREHEN METABOLIC PANEL: CPT

## 2023-08-04 PROCEDURE — 36415 COLL VENOUS BLD VENIPUNCTURE: CPT

## 2023-08-04 RX ORDER — BUSPIRONE HYDROCHLORIDE 5 MG/1
5 TABLET ORAL 2 TIMES DAILY
Qty: 180 TABLET | Refills: 3 | Status: SHIPPED | OUTPATIENT
Start: 2023-08-04

## 2023-08-04 RX ORDER — CETIRIZINE HYDROCHLORIDE 10 MG/1
10 TABLET ORAL DAILY
Qty: 90 TABLET | Refills: 3 | Status: SHIPPED | OUTPATIENT
Start: 2023-08-04 | End: 2024-08-03

## 2023-08-04 RX ORDER — ESCITALOPRAM OXALATE 20 MG/1
20 TABLET ORAL NIGHTLY
Qty: 90 TABLET | Refills: 3 | Status: SHIPPED | OUTPATIENT
Start: 2023-08-04

## 2023-08-04 NOTE — TELEPHONE ENCOUNTER
----- Message from Vadim Gauthier sent at 8/4/2023  9:10 AM CDT -----  Pt requesting 90 script for medications ..   Going out of town and don't know when she will be back     Lexapro 10 mg tab  Buspar 5  mg tab   Zyrtec 20 mg tab           [facility]

## 2023-08-05 ENCOUNTER — HOSPITAL ENCOUNTER (EMERGENCY)
Facility: HOSPITAL | Age: 63
Discharge: HOME | End: 2023-08-05
Attending: EMERGENCY MEDICINE
Payer: COMMERCIAL

## 2023-08-05 ENCOUNTER — CLINICAL SUPPORT (OUTPATIENT)
Dept: URGENT CARE | Facility: CLINIC | Age: 63
End: 2023-08-05
Payer: COMMERCIAL

## 2023-08-05 VITALS
TEMPERATURE: 101 F | DIASTOLIC BLOOD PRESSURE: 78 MMHG | WEIGHT: 199 LBS | SYSTOLIC BLOOD PRESSURE: 130 MMHG | BODY MASS INDEX: 33.15 KG/M2 | HEART RATE: 93 BPM | RESPIRATION RATE: 18 BRPM | OXYGEN SATURATION: 96 % | HEIGHT: 65 IN

## 2023-08-05 VITALS
WEIGHT: 199.75 LBS | SYSTOLIC BLOOD PRESSURE: 134 MMHG | HEART RATE: 82 BPM | BODY MASS INDEX: 33.28 KG/M2 | HEIGHT: 65 IN | DIASTOLIC BLOOD PRESSURE: 80 MMHG | TEMPERATURE: 97 F | RESPIRATION RATE: 24 BRPM | OXYGEN SATURATION: 96 %

## 2023-08-05 DIAGNOSIS — Z53.20 PROCEDURE NOT CARRIED OUT BECAUSE OF PATIENT'S DECISION: Primary | ICD-10-CM

## 2023-08-05 DIAGNOSIS — T50.Z95A ADVERSE EFFECT OF VACCINE, INITIAL ENCOUNTER: Primary | ICD-10-CM

## 2023-08-05 LAB
ALBUMIN SERPL-MCNC: 3.9 G/DL (ref 3.4–4.8)
ALBUMIN/GLOB SERPL: 1.2 RATIO (ref 1.1–2)
ALP SERPL-CCNC: 106 UNIT/L (ref 40–150)
ALT SERPL-CCNC: 23 UNIT/L (ref 0–55)
AST SERPL-CCNC: 32 UNIT/L (ref 5–34)
BASOPHILS # BLD AUTO: 0.02 X10(3)/MCL
BASOPHILS NFR BLD AUTO: 0.3 %
BILIRUBIN DIRECT+TOT PNL SERPL-MCNC: 1 MG/DL
BNP BLD-MCNC: 117.5 PG/ML
BUN SERPL-MCNC: 12.1 MG/DL (ref 9.8–20.1)
CALCIUM SERPL-MCNC: 9.7 MG/DL (ref 8.4–10.2)
CHLORIDE SERPL-SCNC: 98 MMOL/L (ref 98–107)
CO2 SERPL-SCNC: 25 MMOL/L (ref 23–31)
CREAT SERPL-MCNC: 1.01 MG/DL (ref 0.55–1.02)
EOSINOPHIL # BLD AUTO: 0.02 X10(3)/MCL (ref 0–0.9)
EOSINOPHIL NFR BLD AUTO: 0.3 %
ERYTHROCYTE [DISTWIDTH] IN BLOOD BY AUTOMATED COUNT: 12.7 % (ref 11.5–17)
FLUAV AG UPPER RESP QL IA.RAPID: NOT DETECTED
FLUBV AG UPPER RESP QL IA.RAPID: NOT DETECTED
GFR SERPLBLD CREATININE-BSD FMLA CKD-EPI: >60 MLS/MIN/1.73/M2
GLOBULIN SER-MCNC: 3.2 GM/DL (ref 2.4–3.5)
GLUCOSE SERPL-MCNC: 100 MG/DL (ref 82–115)
HCT VFR BLD AUTO: 39.5 % (ref 37–47)
HGB BLD-MCNC: 13.3 G/DL (ref 12–16)
IMM GRANULOCYTES # BLD AUTO: 0.02 X10(3)/MCL (ref 0–0.04)
IMM GRANULOCYTES NFR BLD AUTO: 0.3 %
LYMPHOCYTES # BLD AUTO: 0.58 X10(3)/MCL (ref 0.6–4.6)
LYMPHOCYTES NFR BLD AUTO: 9.5 %
MCH RBC QN AUTO: 30.4 PG (ref 27–31)
MCHC RBC AUTO-ENTMCNC: 33.7 G/DL (ref 33–36)
MCV RBC AUTO: 90.2 FL (ref 80–94)
MONOCYTES # BLD AUTO: 0.43 X10(3)/MCL (ref 0.1–1.3)
MONOCYTES NFR BLD AUTO: 7 %
NEUTROPHILS # BLD AUTO: 5.06 X10(3)/MCL (ref 2.1–9.2)
NEUTROPHILS NFR BLD AUTO: 82.6 %
NRBC BLD AUTO-RTO: 0 %
PLATELET # BLD AUTO: 207 X10(3)/MCL (ref 130–400)
PMV BLD AUTO: 10.8 FL (ref 7.4–10.4)
POTASSIUM SERPL-SCNC: 3.2 MMOL/L (ref 3.5–5.1)
PROT SERPL-MCNC: 7.1 GM/DL (ref 5.8–7.6)
RBC # BLD AUTO: 4.38 X10(6)/MCL (ref 4.2–5.4)
SARS-COV-2 RNA RESP QL NAA+PROBE: NOT DETECTED
SODIUM SERPL-SCNC: 137 MMOL/L (ref 136–145)
TROPONIN I SERPL-MCNC: 0.01 NG/ML (ref 0–0.04)
WBC # SPEC AUTO: 6.13 X10(3)/MCL (ref 4.5–11.5)

## 2023-08-05 PROCEDURE — 99499 NO LOS: ICD-10-PCS | Mod: S$PBB,,, | Performed by: FAMILY MEDICINE

## 2023-08-05 PROCEDURE — 99215 OFFICE O/P EST HI 40 MIN: CPT | Mod: PBBFAC,25,27

## 2023-08-05 PROCEDURE — 84484 ASSAY OF TROPONIN QUANT: CPT | Performed by: PHYSICIAN ASSISTANT

## 2023-08-05 PROCEDURE — 99285 EMERGENCY DEPT VISIT HI MDM: CPT | Mod: 25

## 2023-08-05 PROCEDURE — 80053 COMPREHEN METABOLIC PANEL: CPT | Performed by: PHYSICIAN ASSISTANT

## 2023-08-05 PROCEDURE — 99499 UNLISTED E&M SERVICE: CPT | Mod: S$PBB,,, | Performed by: FAMILY MEDICINE

## 2023-08-05 PROCEDURE — 85025 COMPLETE CBC W/AUTO DIFF WBC: CPT | Performed by: PHYSICIAN ASSISTANT

## 2023-08-05 PROCEDURE — 96372 THER/PROPH/DIAG INJ SC/IM: CPT | Performed by: PHYSICIAN ASSISTANT

## 2023-08-05 PROCEDURE — 93005 ELECTROCARDIOGRAM TRACING: CPT

## 2023-08-05 PROCEDURE — 83880 ASSAY OF NATRIURETIC PEPTIDE: CPT | Performed by: PHYSICIAN ASSISTANT

## 2023-08-05 PROCEDURE — 0240U COVID/FLU A&B PCR: CPT | Performed by: PHYSICIAN ASSISTANT

## 2023-08-05 PROCEDURE — 63600175 PHARM REV CODE 636 W HCPCS: Performed by: PHYSICIAN ASSISTANT

## 2023-08-05 RX ORDER — FUROSEMIDE 40 MG/1
40 TABLET ORAL DAILY PRN
COMMUNITY
Start: 2023-04-17

## 2023-08-05 RX ORDER — KETOROLAC TROMETHAMINE 30 MG/ML
15 INJECTION, SOLUTION INTRAMUSCULAR; INTRAVENOUS
Status: COMPLETED | OUTPATIENT
Start: 2023-08-05 | End: 2023-08-05

## 2023-08-05 RX ORDER — ISOSORBIDE DINITRATE 10 MG/1
TABLET ORAL
COMMUNITY
Start: 2023-04-17

## 2023-08-05 RX ORDER — METOCLOPRAMIDE HYDROCHLORIDE 5 MG/ML
10 INJECTION INTRAMUSCULAR; INTRAVENOUS
Status: COMPLETED | OUTPATIENT
Start: 2023-08-05 | End: 2023-08-05

## 2023-08-05 RX ADMIN — METOCLOPRAMIDE 10 MG: 5 INJECTION, SOLUTION INTRAMUSCULAR; INTRAVENOUS at 07:08

## 2023-08-05 RX ADMIN — KETOROLAC TROMETHAMINE 15 MG: 30 INJECTION INTRAMUSCULAR; INTRAVENOUS at 07:08

## 2023-08-06 NOTE — ED PROVIDER NOTES
Encounter Date: 8/5/2023       History     Chief Complaint   Patient presents with    Fever     C/o took 4 vaccines yesterday and ran fever today. Took theraflu. Afebrile at present. Also c/o sob o2 sats 95%     Patient reports to the ER with reports of fever and SOB after taking multiple vaccines yesterday at [facility]; pt is also complaining of a headache    The history is provided by the patient.   Fever  Primary symptoms of the febrile illness include fever, cough and shortness of breath. Primary symptoms do not include fatigue, nausea, dysuria or rash. The current episode started yesterday. This is a new problem.   The maximum temperature recorded prior to her arrival was 100 to 100.9 F.   The cough began yesterday. The cough is productive. The sputum is clear.   The shortness of breath began yesterday.     Review of patient's allergies indicates:   Allergen Reactions    Penicillins      Other reaction(s): was uanable to arouse     Past Medical History:   Diagnosis Date    Acid reflux     Angina pectoris     Anxiety and depression     Blockage of coronary artery of heart     CAD (coronary artery disease)     Claustrophobia     Claustrophobia     Mild    HLD (hyperlipidemia)     HTN (hypertension)     Kidney stones     Obesity, unspecified     CROW (obstructive sleep apnea)     PAD (peripheral artery disease)     Pain in right arm     Pancreatitis     Pancreatitis     Peripheral arterial disease     Personal history of colonic polyps 10/12/2018    Dr. Bonilla Zavala    Pseudoaneurysm     SOB (shortness of breath)     TIA (transient ischemic attack)     Weakness of right hand      Past Surgical History:   Procedure Laterality Date    APPENDECTOMY  1977    Application of short arm splint      Carotid Angiogram  08/24/2022    COLONOSCOPY W/ POLYPECTOMY  10/12/2018    Dr. Bonilla Zavala    COLONOSCOPY, WITH 1 OR MORE BIOPSIES N/A 5/2/2023    Procedure: COLON;  Surgeon: Asif Byrnes MD;  [facility];  Service: Gastroenterology;  Laterality: N/A;    CORONARY STENT PLACEMENT N/A     circumflex    Cyst of breast      Dilation of Coronary Artery       ECTOPIC PREGNANCY SURGERY      EGD, WITH CLOSED BIOPSY N/A 2023    Procedure: DOUBLE;  Surgeon: Asif Byrnes MD;  Location: [facility] ENDOSCOPY;  Service: Gastroenterology;  Laterality: N/A;    ESOPHAGOGASTRODUODENOSCOPY (EGD)- DEVICE ASSISTED N/A 2023    Procedure: ESOPHAGOGASTRODUODENOSCOPY (EGD)- DEVICE ASSISTED;  Surgeon: Asif Byrnes MD;  Location: [facility] ENDOSCOPY;  Service: Gastroenterology;  Laterality: N/A;  52 Fr Malony dilation    Excision of Left Greater Saphenous  N/A     Fluroscopy of Left Heart Using Low Osmolar Contrast  N/A     HYSTERECTOMY      Immobilzation of Right Lower Arm Using Splint      LAPAROSCOPIC CHOLECYSTECTOMY N/A     LAPAROSCOPIC TOTAL HYSTERECTOMY  1998    Laroscopic  N/A     Percutaneous Transluminal N/A     Resection of Gallbladder      TONSILLECTOMY      TRANSCAROTID ARTERY REVASCULARIZATION (TCAR) Right      Family History   Problem Relation Age of Onset    Cancer Mother          at 62    Hypertension Mother     Breast cancer Mother     Lung cancer Mother     Hypertension Father     Coronary artery disease Father     Early death Father          at 46    Heart disease Father     Stroke Sister     Hypertension Sister     Early death Sister          at 32    Hyperlipidemia Sister     Hypertension Brother     Coronary artery disease Brother         CABG    Heart disease Brother     Cancer Maternal Aunt     Early death Maternal Aunt          at 56    Heart disease Maternal Aunt     Heart disease Maternal Aunt     Hypertension Sister     Kidney disease Paternal Aunt      Social History     Tobacco Use    Smoking status: Former     Current packs/day: 0.00     Average packs/day: 1.5 packs/day for 40.5 years (60.7 ttl pk-yrs)     Types: Cigarettes     Start  date: 1975     Quit date: 2015     Years since quittin.1    Smokeless tobacco: Never    Tobacco comments:     Quit 2015   Substance Use Topics    Alcohol use: Never    Drug use: Never     Review of Systems   Constitutional:  Positive for fever. Negative for fatigue.   HENT:  Negative for sore throat.    Eyes: Negative.    Respiratory:  Positive for cough and shortness of breath.    Cardiovascular:  Negative for chest pain.   Gastrointestinal:  Negative for nausea.   Genitourinary:  Negative for dysuria.   Musculoskeletal:  Negative for back pain.   Skin:  Negative for rash.   Neurological:  Negative for weakness.   Hematological:  Does not bruise/bleed easily.   Psychiatric/Behavioral: Negative.         Physical Exam     Initial Vitals [23 1724]   BP Pulse Resp Temp SpO2   134/80 82 (!) 24 97.3 °F (36.3 °C) 96 %      MAP       --         Physical Exam    Vitals reviewed.  Constitutional: She appears well-developed and well-nourished. No distress.   HENT:   Head: Normocephalic and atraumatic.   Nose: Nose normal.   Mouth/Throat: Oropharynx is clear and moist.   Eyes: Conjunctivae and EOM are normal. Pupils are equal, round, and reactive to light.   Neck: Neck supple.   Normal range of motion.  Cardiovascular:  Normal rate, regular rhythm, normal heart sounds and intact distal pulses.           Pulmonary/Chest: Breath sounds normal. No respiratory distress. She has no wheezes. She exhibits no tenderness.   Abdominal: Abdomen is soft. Bowel sounds are normal. She exhibits no distension. There is no abdominal tenderness. There is no rebound and no guarding.   Musculoskeletal:         General: Normal range of motion.      Cervical back: Normal range of motion and neck supple.     Lymphadenopathy:     She has no cervical adenopathy.   Neurological: She is alert and oriented to person, place, and time. She displays normal reflexes. No cranial nerve deficit or sensory deficit.   Skin: Skin is warm and  dry.   Psychiatric: She has a normal mood and affect. Her behavior is normal. Judgment and thought content normal.         ED Course   Procedures  Labs Reviewed   COMPREHENSIVE METABOLIC PANEL - Abnormal; Notable for the following components:       Result Value    Potassium Level 3.2 (*)     All other components within normal limits   CBC WITH DIFFERENTIAL - Abnormal; Notable for the following components:    MPV 10.8 (*)     Lymph # 0.58 (*)     All other components within normal limits   B-TYPE NATRIURETIC PEPTIDE - Abnormal; Notable for the following components:    Natriuretic Peptide 117.5 (*)     All other components within normal limits   COVID/FLU A&B PCR - Normal    Narrative:     The XpOneShield Xpress SARS-CoV-2/FLU/RSV plus is a rapid, multiplexed real-time PCR test intended for the simultaneous qualitative detection and differentiation of SARS-CoV-2, Influenza A, Influenza B, and respiratory syncytial virus (RSV) viral RNA in either nasopharyngeal swab or nasal swab specimens.         TROPONIN I - Normal   CBC W/ AUTO DIFFERENTIAL    Narrative:     The following orders were created for panel order CBC auto differential.  Procedure                               Abnormality         Status                     ---------                               -----------         ------                     CBC with Differential[[id number]]        Abnormal            Final result                 Please view results for these tests on the individual orders.   EXTRA TUBES    Narrative:     The following orders were created for panel order EXTRA TUBES.  Procedure                               Abnormality         Status                     ---------                               -----------         ------                     Light Blue Top Hold[[id number]]                                                         Light Green Top Hold[[id number]]                                                        Gold Top Hold[[id number]]                                                                  Please view results for these tests on the individual orders.   LIGHT BLUE TOP HOLD   LIGHT GREEN TOP HOLD   GOLD TOP HOLD        ECG Results              EKG 12-lead (Final result)  Result time 08/06/23 06:49:04      Final result by Interface, Lab In [facility] (08/06/23 06:49:04)                   Narrative:    Test Reason : R06.02,    Vent. Rate : 077 BPM     Atrial Rate : 077 BPM     P-R Int : 150 ms          QRS Dur : 092 ms      QT Int : 426 ms       P-R-T Axes : 093 -10 037 degrees     QTc Int : 482 ms    Normal sinus rhythm  ST and T wave abnormality, consider inferior ischemia  Abnormal ECG  When compared with ECG of 07-FEB-2023 13:29,  ST now depressed in Inferior leads  ST now depressed in Anterior leads  T wave inversion now evident in Inferior leads  T wave inversion now evident in Lateral leads  Confirmed by Hugo Viveros MD ([id number]) on 8/6/2023 6:48:57 AM    Referred By: [email or web address]   SELF           Confirmed By:Hugo Viveros MD                                  Imaging Results              X-Ray Chest 1 View (Final result)  Result time 08/05/23 19:16:30      Final result by Ryan Mclean MD (08/05/23 19:16:30)                   Impression:      No acute cardiopulmonary abnormality.      Electronically signed by: Ryan Mclean MD  Date:    08/05/2023  Time:    19:16               Narrative:    EXAMINATION:  Single view chest radiograph.    CLINICAL HISTORY:  subjective fever;    TECHNIQUE:  Single view of the chest.    COMPARISON:  Chest radiograph 02/04/2022.    FINDINGS:  The lungs are clear without consolidation or effusion.  There is no pneumothorax.  The cardiac silhouette is normal in size.  There is no acute osseous abnormality.                                       Medications   ketorolac injection 15 mg (15 mg Intramuscular Given 8/5/23 1930)   metoclopramide HCl injection 10 mg (10 mg Intramuscular Given 8/5/23 1930)     Medical Decision Making:    Differential Diagnosis:     Covid vs flu vs vaccine adverse reaction vs pneumonia  ED Management:    Patient reports resolution of headache and states she has no SOB     Given strict ED return precautions. I have spoken with the patient and/or caregivers. I have explained the patient's condition, diagnoses and treatment plan based on the information available to me at this time. I have answered the patient's and/or caregiver's questions and addressed any concerns. The patient and/or caregivers have as good an understanding of the patient's diagnosis, condition and treatment plan as can be expected at this point. The vital signs have been stable. The patient's condition is stable and appropriate for discharge from the emergency department.      The patient will pursue further outpatient evaluation with the primary care physician or other designated or consulting physician as outlined in the discharge instructions. The patient and/or caregivers are agreeable to this plan of care and follow-up instructions have been explained in detail. The patient and/or caregivers have received these instructions in written format and have expressed an understanding of the discharge instructions. The patient and/or caregivers are aware that any significant change in condition or worsening of symptoms should prompt an immediate return to this or the closest emergency department or a call to 911.                                Clinical Impression:   Final diagnoses:  [T50.Z95A] Adverse effect of vaccine, initial encounter (Primary)        ED Disposition Condition    Discharge Stable          ED Prescriptions    None       Follow-up Information       Follow up With Specialties Details Why Contact Info    discharge followup    If your symptoms become WORSE or you DO NOT IMPROVE and you are unable to reach your health care provider, you should RETURN to the emergency department    Bonilla Robles MD Internal Medicine   [address]  [facility] [address]  [phone]      discharge info    Discussed all pertinent ED information, results, diagnosis and treatment plan; All questions and concerns were addressed at this time. Patient voices understanding of information and instructions. Patient is comfortable with plan and discharge             Caden Gupta PA  08/06/23 0913

## 2023-08-09 NOTE — PROGRESS NOTES
Patient ID: [id number]     Chief Complaint: Follow-up (ED F/U )    HPI:     Tiny Silverman is a 62 y.o. female here today for an ED follow up. Received 4 vaccines at one time with drug store this week and developed severe headache, fever, and body aches the following day. Presented to the ED where she had workup including EKG, which showed some new ST depression and T wave inversion. Mildly hypokalemic. She is followed by Cardiology, Dr. Olivo, and admits to having some mild left sided chest pain that comes and goes - known CAD s/p multiple PCI with MICHELLE.  No other complaints today.     Past Medical History:   Diagnosis Date    Acid reflux     Angina pectoris     Anxiety and depression     CAD (coronary artery disease)     Claustrophobia     Mild    HLD (hyperlipidemia)     HTN (hypertension)     Kidney stones     Obesity, unspecified     CROW (obstructive sleep apnea)     PAD (peripheral artery disease)     Pancreatitis     Personal history of colonic polyps 10/12/2018    Dr. Bonilla Zavala    Pseudoaneurysm     TIA (transient ischemic attack)     Weakness of right hand         Past Surgical History:   Procedure Laterality Date    APPENDECTOMY  1977    Application of short arm splint      Carotid Angiogram  08/24/2022    COLONOSCOPY W/ POLYPECTOMY  10/12/2018    Dr. Bonilla Zavala    COLONOSCOPY, WITH 1 OR MORE BIOPSIES N/A 5/2/2023    Procedure: COLON;  Surgeon: Asif Byrnes MD;  Location: [facility] ENDOSCOPY;  Service: Gastroenterology;  Laterality: N/A;    CORONARY STENT PLACEMENT N/A     circumflex    Cyst of breast  2002    Dilation of Coronary Artery       ECTOPIC PREGNANCY SURGERY      EGD, WITH CLOSED BIOPSY N/A 5/2/2023    Procedure: DOUBLE;  Surgeon: Asif Byrnes MD;  Location: [facility] ENDOSCOPY;  Service: Gastroenterology;  Laterality: N/A;    ESOPHAGOGASTRODUODENOSCOPY (EGD)- DEVICE ASSISTED N/A 5/2/2023    Procedure: ESOPHAGOGASTRODUODENOSCOPY (EGD)- DEVICE ASSISTED;  Surgeon:  Asif Byrnes MD;  Location: [facility] ENDOSCOPY;  Service: Gastroenterology;  Laterality: N/A;  52 Fr Malony dilation    Excision of Left Greater Saphenous  N/A     Fluroscopy of Left Heart Using Low Osmolar Contrast  N/A     HYSTERECTOMY      Immobilzation of Right Lower Arm Using Splint      LAPAROSCOPIC CHOLECYSTECTOMY N/A     LAPAROSCOPIC TOTAL HYSTERECTOMY  1998    Laroscopic  N/A     Percutaneous Transluminal N/A     Resection of Gallbladder      TONSILLECTOMY      TRANSCAROTID ARTERY REVASCULARIZATION (TCAR) Right         Social History     Tobacco Use    Smoking status: Former     Current packs/day: 0.00     Average packs/day: 1.5 packs/day for 40.5 years (60.7 ttl pk-yrs)     Types: Cigarettes     Start date: 1975     Quit date: 2015     Years since quittin.1    Smokeless tobacco: Never    Tobacco comments:     Quit 2015   Substance and Sexual Activity    Alcohol use: Never    Drug use: Never    Sexual activity: Not Currently     Partners: Male     Birth control/protection: Abstinence        Current Outpatient Medications   Medication Instructions    aspirin (ECOTRIN) 81 MG EC tablet aspirin 81 mg tablet,delayed release   Take 1 tablet every day by oral route.    atorvastatin (LIPITOR) 40 mg, Oral, Daily    busPIRone (BUSPAR) 5 mg, Oral, 2 times daily    cetirizine (ZYRTEC) 10 mg, Oral, Daily    EScitalopram oxalate (LEXAPRO) 20 mg, Oral, Nightly    ezetimibe (ZETIA) 10 mg tablet ezetimibe 10 mg tablet    fluticasone propionate (FLONASE) 50 mcg, Each Nostril, 2 times daily    furosemide (LASIX) 40 mg, Oral, Daily PRN    isosorbide dinitrate (ISORDIL) 10 MG tablet No dose, route, or frequency recorded.    isosorbide mononitrate (ISMO,MONOKET) 20 mg, Oral, 2 times daily    nitroGLYCERIN (NITROSTAT) 0.4 MG SL tablet nitroglycerin 0.4 mg sublingual tablet   TAKE 1 TABLET UNDER THE TONGUE ONCE A DAY AS NEEDED FOR CHEST PAIN    omeprazole (PRILOSEC) 40 mg, Oral, Daily     "potassium chloride SA (K-DUR,KLOR-CON) 20 MEQ tablet 20 mEq, Oral, Daily    ranolazine (RANEXA) 1,000 mg Tb12 ranolazine ER 1,000 mg tablet,extended release,12 hr   TAKE 1 TABLET BY MOUTH TWICE A DAY       Review of patient's allergies indicates:   Allergen Reactions    Penicillins      Other reaction(s): was uanable to arouse        Patient Care Team:  Bonilla Robles MD as PCP - General (Internal Medicine)  Mp Olivo MD as Consulting Physician (Cardiology)  Lior Flores MD as Consulting Physician (Cardiothoracic Surgery)  Asif Byrnes MD as Consulting Physician (Gastroenterology)     Subjective:     Review of Systems    12 point review of systems conducted, negative except as stated in the history of present illness. See HPI for details.    Objective:     Visit Vitals  /72 (BP Location: Right arm, Patient Position: Sitting)   Pulse 69   Temp 98 °F (36.7 °C)   Resp 16   Ht 5' 5" (1.651 m)   Wt 89.8 kg (197 lb 14.4 oz)   SpO2 98%   BMI 32.93 kg/m²       Physical Exam  Vitals and nursing note reviewed.   Constitutional:       General: She is not in acute distress.     Appearance: She is not ill-appearing.   HENT:      Head: Normocephalic and atraumatic.      Mouth/Throat:      Mouth: Mucous membranes are moist.      Pharynx: Oropharynx is clear.   Eyes:      General: No scleral icterus.     Extraocular Movements: Extraocular movements intact.      Conjunctiva/sclera: Conjunctivae normal.      Pupils: Pupils are equal, round, and reactive to light.   Neck:      Vascular: No carotid bruit.   Cardiovascular:      Rate and Rhythm: Normal rate and regular rhythm.      Heart sounds: No murmur heard.     No friction rub. No gallop.   Pulmonary:      Effort: Pulmonary effort is normal. No respiratory distress.      Breath sounds: Normal breath sounds. No wheezing, rhonchi or rales.   Abdominal:      General: Abdomen is flat. Bowel sounds are normal. There is no distension.      Palpations: " Abdomen is soft. There is no mass.      Tenderness: There is no abdominal tenderness.   Musculoskeletal:         General: Normal range of motion.      Cervical back: Normal range of motion and neck supple.   Skin:     General: Skin is warm and dry.   Neurological:      General: No focal deficit present.      Mental Status: She is alert.   Psychiatric:         Mood and Affect: Mood normal.         Assessment:       ICD-10-CM ICD-9-CM   1. Coronary artery disease, unspecified vessel or lesion type, unspecified whether angina present, unspecified whether native or transplanted heart  I25.10 414.00   2. Hypokalemia  E87.6 276.8        Plan:     1. Coronary artery disease, unspecified vessel or lesion type, unspecified whether angina present, unspecified whether native or transplanted heart  Assessment & Plan:  Continue daily Aspirin + Isosorbide 20mg BID + Ranexa 1000mg ER BID  s/p PCI with MICHELLE  Stressed importance of adequate LDL, HA1C, and BP control.  Discussed appropriate lifestyle changes including smoking cessation, exercise, weight loss, and dietary modifications.  ED precautions reviewed including SOB, MENDOZA, chest pain, dizziness, near syncope, palpitations, or jaw/back/neck discomfort.   Continue following up with Cardiology - Dr. Olivo. Will call office to schedule appt.   ED precautions discussed        2. Hypokalemia  Assessment & Plan:  Continue Lasix 40mg daily + add 20meq K Dur daily   Repeat BMP in 2 weeks    Orders:  -     Basic Metabolic Panel; Future; Expected date: 08/10/2023    Other orders  -     Discontinue: potassium chloride SA (K-DUR,KLOR-CON) 20 MEQ tablet; Take 1 tablet (20 mEq total) by mouth 2 (two) times daily.  Dispense: 30 tablet; Refill: 2  -     potassium chloride SA (K-DUR,KLOR-CON) 20 MEQ tablet; Take 1 tablet (20 mEq total) by mouth once daily.  Dispense: 90 tablet; Refill: 1         In addition to their scheduled follow up, the patient has also been instructed to follow up on as  needed basis.     Future Appointments   Date Time Provider Department Center   8/14/2023  1:00 PM Lejeune[facility]

## 2023-08-10 ENCOUNTER — TELEPHONE (OUTPATIENT)
Dept: INTERNAL MEDICINE | Facility: CLINIC | Age: 63
End: 2023-08-10

## 2023-08-10 ENCOUNTER — OFFICE VISIT (OUTPATIENT)
Dept: INTERNAL MEDICINE | Facility: CLINIC | Age: 63
End: 2023-08-10
Payer: COMMERCIAL

## 2023-08-10 VITALS
RESPIRATION RATE: 16 BRPM | HEART RATE: 69 BPM | HEIGHT: 65 IN | WEIGHT: 197.88 LBS | TEMPERATURE: 98 F | OXYGEN SATURATION: 98 % | DIASTOLIC BLOOD PRESSURE: 72 MMHG | BODY MASS INDEX: 32.97 KG/M2 | SYSTOLIC BLOOD PRESSURE: 130 MMHG

## 2023-08-10 DIAGNOSIS — E87.6 HYPOKALEMIA: ICD-10-CM

## 2023-08-10 DIAGNOSIS — I25.10 CORONARY ARTERY DISEASE, UNSPECIFIED VESSEL OR LESION TYPE, UNSPECIFIED WHETHER ANGINA PRESENT, UNSPECIFIED WHETHER NATIVE OR TRANSPLANTED HEART: Primary | ICD-10-CM

## 2023-08-10 PROBLEM — G45.9 TIA (TRANSIENT ISCHEMIC ATTACK): Status: ACTIVE | Noted: 2023-08-10

## 2023-08-10 PROBLEM — G47.33 OSA (OBSTRUCTIVE SLEEP APNEA): Status: ACTIVE | Noted: 2023-08-10

## 2023-08-10 PROCEDURE — 1159F MED LIST DOCD IN RCRD: CPT | Mod: CPTII,,, | Performed by: NURSE PRACTITIONER

## 2023-08-10 PROCEDURE — 3008F PR BODY MASS INDEX (BMI) DOCUMENTED: ICD-10-PCS | Mod: CPTII,,, | Performed by: NURSE PRACTITIONER

## 2023-08-10 PROCEDURE — 1160F RVW MEDS BY RX/DR IN RCRD: CPT | Mod: CPTII,,, | Performed by: NURSE PRACTITIONER

## 2023-08-10 PROCEDURE — 1159F PR MEDICATION LIST DOCUMENTED IN MEDICAL RECORD: ICD-10-PCS | Mod: CPTII,,, | Performed by: NURSE PRACTITIONER

## 2023-08-10 PROCEDURE — 3078F DIAST BP <80 MM HG: CPT | Mod: CPTII,,, | Performed by: NURSE PRACTITIONER

## 2023-08-10 PROCEDURE — 3008F BODY MASS INDEX DOCD: CPT | Mod: CPTII,,, | Performed by: NURSE PRACTITIONER

## 2023-08-10 PROCEDURE — 1160F PR REVIEW ALL MEDS BY PRESCRIBER/CLIN PHARMACIST DOCUMENTED: ICD-10-PCS | Mod: CPTII,,, | Performed by: NURSE PRACTITIONER

## 2023-08-10 PROCEDURE — 3075F PR MOST RECENT SYSTOLIC BLOOD PRESS GE 130-139MM HG: ICD-10-PCS | Mod: CPTII,,, | Performed by: NURSE PRACTITIONER

## 2023-08-10 PROCEDURE — 3078F PR MOST RECENT DIASTOLIC BLOOD PRESSURE < 80 MM HG: ICD-10-PCS | Mod: CPTII,,, | Performed by: NURSE PRACTITIONER

## 2023-08-10 PROCEDURE — 3075F SYST BP GE 130 - 139MM HG: CPT | Mod: CPTII,,, | Performed by: NURSE PRACTITIONER

## 2023-08-10 PROCEDURE — 99214 OFFICE O/P EST MOD 30 MIN: CPT | Mod: ,,, | Performed by: NURSE PRACTITIONER

## 2023-08-10 PROCEDURE — 99214 PR OFFICE/OUTPT VISIT, EST, LEVL IV, 30-39 MIN: ICD-10-PCS | Mod: ,,, | Performed by: NURSE PRACTITIONER

## 2023-08-10 RX ORDER — POTASSIUM CHLORIDE 20 MEQ/1
20 TABLET, EXTENDED RELEASE ORAL 2 TIMES DAILY
Qty: 30 TABLET | Refills: 2 | Status: SHIPPED | OUTPATIENT
Start: 2023-08-10 | End: 2023-08-10 | Stop reason: SDUPTHER

## 2023-08-10 RX ORDER — POTASSIUM CHLORIDE 20 MEQ/1
20 TABLET, EXTENDED RELEASE ORAL DAILY
Qty: 90 TABLET | Refills: 1 | Status: SHIPPED | OUTPATIENT
Start: 2023-08-10

## 2023-08-10 NOTE — PROGRESS NOTES
Subjective:       Patient ID: Tiny Silverman is a 62 y.o. female.    Chief Complaint: Follow up    Diagnosis: Iron Deficiency Anemia    Current Treatment: Oral Iron Supplements    Treatment History:   Injectafer x2 March '23    HPI:   61yo F who presents in April '23 for evaluation of anemia. Mild anemia first really noted in December '22 when her hemoglobin was average around 11, however in February '23 her hemoglobin dropped to approximately 9. Workup into her anemia revealed evidence of iron deficiency anemia with iron level of 6 and Ferritin of 7. She was given Injectafer x2 by per PCP in March '22. B12 and folate were normal. FOBT was negative, however she is scheduled to undergo colonoscopy in the coming months. She was referred to hematology for further evaluation.     Today she complains of increased fatigue over the last 3 months. She denies any evidence of bleeding. She also notes increased abdominal distention over the last month to the point she can no longer fit in her clothes as well as a 20lb weight gain over the last 2-3 weeks.     Interval History:  Patient presents to clinic today for 3 month MD follow up appointment with labs prior to discuss results and plan of treatment.   She had a fever over the weekend but thinks this is related to recent vaccines.   She otherwise has been feeling okay.   Denies any obvious blood loss.   Energy has been stable. No PICA or neuropathy.   Discussed her lab results in detail.       Past Medical History:   Diagnosis Date    Acid reflux     Angina pectoris     Anxiety and depression     CAD (coronary artery disease)     Claustrophobia     Mild    HLD (hyperlipidemia)     HTN (hypertension)     Kidney stones     Obesity, unspecified     CROW (obstructive sleep apnea)     PAD (peripheral artery disease)     Pancreatitis     Personal history of colonic polyps 10/12/2018    Dr. Bonilla Zavala    Pseudoaneurysm     TIA (transient ischemic attack)     Weakness of right  hand       Past Surgical History:   Procedure Laterality Date    APPENDECTOMY      Application of short arm splint      Carotid Angiogram  2022    COLONOSCOPY W/ POLYPECTOMY  10/12/2018    Dr. Bonilla Zavala    COLONOSCOPY, WITH 1 OR MORE BIOPSIES N/A 2023    Procedure: COLON;  Surgeon: Asif Byrnes MD;  Location: [facility] ENDOSCOPY;  Service: Gastroenterology;  Laterality: N/A;    CORONARY STENT PLACEMENT N/A     circumflex    Cyst of breast      Dilation of Coronary Artery       ECTOPIC PREGNANCY SURGERY      EGD, WITH CLOSED BIOPSY N/A 2023    Procedure: DOUBLE;  Surgeon: Asif Byrnes MD;  Location: [facility] ENDOSCOPY;  Service: Gastroenterology;  Laterality: N/A;    ESOPHAGOGASTRODUODENOSCOPY (EGD)- DEVICE ASSISTED N/A 2023    Procedure: ESOPHAGOGASTRODUODENOSCOPY (EGD)- DEVICE ASSISTED;  Surgeon: Asif Byrnes MD;  Location: [facility] ENDOSCOPY;  Service: Gastroenterology;  Laterality: N/A;  52 Fr Malony dilation    Excision of Left Greater Saphenous  N/A     Fluroscopy of Left Heart Using Low Osmolar Contrast  N/A     HYSTERECTOMY      Immobilzation of Right Lower Arm Using Splint      LAPAROSCOPIC CHOLECYSTECTOMY N/A     LAPAROSCOPIC TOTAL HYSTERECTOMY  1998    Laroscopic  N/A     Percutaneous Transluminal N/A     Resection of Gallbladder      TONSILLECTOMY      TRANSCAROTID ARTERY REVASCULARIZATION (TCAR) Right      Social History     Socioeconomic History    Marital status: Single   Tobacco Use    Smoking status: Former     Current packs/day: 0.00     Average packs/day: 1.5 packs/day for 40.5 years (60.7 ttl pk-yrs)     Types: Cigarettes     Start date: 1975     Quit date: 2015     Years since quittin.1    Smokeless tobacco: Never    Tobacco comments:     Quit 2015   Substance and Sexual Activity    Alcohol use: Never    Drug use: Never    Sexual activity: Not Currently     Partners: Male     Birth control/protection:  Abstinence     Social Determinants of Health     Financial Resource Strain: Low Risk  (2023)    Overall Financial Resource Strain (CARDIA)     Difficulty of Paying Living Expenses: Not very hard   Food Insecurity: No Food Insecurity (2023)    Hunger Vital Sign     Worried About Running Out of Food in the Last Year: Never true     Ran Out of Food in the Last Year: Never true   Transportation Needs: No Transportation Needs (2023)    PRAPARE - Transportation     Lack of Transportation (Medical): No     Lack of Transportation (Non-Medical): No   Physical Activity: Inactive (2023)    Exercise Vital Sign     Days of Exercise per Week: 0 days     Minutes of Exercise per Session: 0 min   Stress: No Stress Concern Present (2023)    [employer] - Occupational Stress Questionnaire     Feeling of Stress : Only a little   Social Connections: Unknown (2023)    Social Connection and Isolation Panel [NHANES]     Frequency of Communication with Friends and Family: Three times a week     Frequency of Social Gatherings with Friends and Family: Never     Active Member of Clubs or Organizations: No     Attends Club or Organization Meetings: Patient refused     Marital Status:    Housing Stability: Low Risk  (2023)    Housing Stability Vital Sign     Unable to Pay for Housing in the Last Year: No     Number of Places Lived in the Last Year: 2     Unstable Housing in the Last Year: No      Family History   Problem Relation Age of Onset    Cancer Mother          at 62    Hypertension Mother     Breast cancer Mother     Lung cancer Mother     Hypertension Father     Coronary artery disease Father     Early death Father          at 46    Heart disease Father     Stroke Sister     Hypertension Sister     Early death Sister          at 32    Hyperlipidemia Sister     Hypertension Brother     Coronary artery disease Brother         CABG    Heart disease Brother      Cancer Maternal Aunt     Early death Maternal Aunt          at 56    Heart disease Maternal Aunt     Heart disease Maternal Aunt     Hypertension Sister     Kidney disease Paternal Aunt       Review of patient's allergies indicates:   Allergen Reactions    Penicillins      Other reaction(s): was uanable to arouse      Review of Systems   Constitutional:  Positive for unexpected weight change. Negative for appetite change.   HENT:  Negative for mouth sores.    Eyes:  Negative for visual disturbance.   Respiratory:  Positive for shortness of breath. Negative for cough.    Cardiovascular:  Positive for chest pain.   Gastrointestinal:  Positive for abdominal pain. Negative for diarrhea.   Genitourinary:  Positive for frequency.   Musculoskeletal:  Positive for back pain.   Integumentary:  Negative for rash.   Neurological:  Positive for headaches.   Hematological:  Negative for adenopathy.   Psychiatric/Behavioral:  The patient is nervous/anxious.          Objective:      Vitals:    23 1309   BP: 117/74   Pulse: 66   Resp: 18   Temp: 98 °F (36.7 °C)           Physical Exam  Constitutional:       General: She is not in acute distress.     Appearance: Normal appearance. She is not ill-appearing.   HENT:      Head: Normocephalic and atraumatic.      Nose: Nose normal.      Mouth/Throat:      Mouth: Mucous membranes are moist.      Pharynx: Oropharynx is clear.   Eyes:      Extraocular Movements: Extraocular movements intact.      Conjunctiva/sclera: Conjunctivae normal.      Pupils: Pupils are equal, round, and reactive to light.   Cardiovascular:      Rate and Rhythm: Normal rate and regular rhythm.      Pulses: Normal pulses.      Heart sounds: Normal heart sounds. No murmur heard.  Pulmonary:      Effort: Pulmonary effort is normal. No respiratory distress.      Breath sounds: Normal breath sounds.   Abdominal:      Palpations: Abdomen is soft.   Musculoskeletal:         General: Normal range of motion.       Cervical back: Normal range of motion and neck supple.      Right lower leg: No edema.      Left lower leg: No edema.   Lymphadenopathy:      Cervical: No cervical adenopathy.   Skin:     General: Skin is warm and dry.   Neurological:      General: No focal deficit present.      Mental Status: She is alert and oriented to person, place, and time.         LABS AND IMAGING REVIEWED IN EPIC    5/8/23 CT Abdomen/Pelvis  Impression:   No acute abnormality of the abdomen and pelvis.    3/28/23 Bilateral Mammogram w/Kuldip  IMPRESSION: NEGATIVE  There is no mammographic evidence of malignancy.     Assessment:   Iron deficiency Anemia - Noted at the end of '22/beginning of '23. S/p IV iron infusions in March '23. Planned GI evaluation in Summer '23. No other etiology found to have contribute to her recent acute on chronic anemia which is now resolved.         Plan:       - Can start iron supplementation PO - recommended 100mg EOD or Daily with Vitamin C if possible  - She is losing her health insurance for a year due to long-term prior to turning 64. She would like to wait until her medicare kicks in to be seen.   - RTC 1 year for NP visit, labs same day to evaluate anemia      Elizabeth Kennedy LeJeune, MD  Hematology/Oncology   [facility]

## 2023-08-10 NOTE — TELEPHONE ENCOUNTER
----- Message from Cristiano Gómez sent at 8/10/2023  2:19 PM CDT -----  .Type:  Needs Medical Advice    Who Called: [facility]  Symptoms (please be specific):    How long has patient had these symptoms:    Pharmacy name and phone #:    Would the patient rather a call back or a response via [facility]?   Best Call Back Number: [phone]  Additional Information:  She needs a call back re: a script they had received from Ms. Villatoro's patient which the PCP is Dr. Zavala.

## 2023-08-10 NOTE — ASSESSMENT & PLAN NOTE
Continue daily Aspirin + Isosorbide 20mg BID + Ranexa 1000mg ER BID  s/p PCI with MICHELLE  Stressed importance of adequate LDL, HA1C, and BP control.  Discussed appropriate lifestyle changes including smoking cessation, exercise, weight loss, and dietary modifications.  ED precautions reviewed including SOB, MENDOZA, chest pain, dizziness, near syncope, palpitations, or jaw/back/neck discomfort.   Continue following up with Cardiology - Dr. Olivo. Will call office to schedule appt.   ED precautions discussed

## 2023-08-14 ENCOUNTER — OFFICE VISIT (OUTPATIENT)
Dept: HEMATOLOGY/ONCOLOGY | Facility: CLINIC | Age: 63
End: 2023-08-14
Payer: COMMERCIAL

## 2023-08-14 ENCOUNTER — TELEPHONE (OUTPATIENT)
Dept: HEMATOLOGY/ONCOLOGY | Facility: CLINIC | Age: 63
End: 2023-08-14

## 2023-08-14 VITALS
DIASTOLIC BLOOD PRESSURE: 74 MMHG | BODY MASS INDEX: 33.02 KG/M2 | WEIGHT: 198.19 LBS | HEIGHT: 65 IN | TEMPERATURE: 98 F | OXYGEN SATURATION: 94 % | SYSTOLIC BLOOD PRESSURE: 117 MMHG | RESPIRATION RATE: 18 BRPM | HEART RATE: 66 BPM

## 2023-08-14 DIAGNOSIS — D50.8 IRON DEFICIENCY ANEMIA SECONDARY TO INADEQUATE DIETARY IRON INTAKE: Primary | ICD-10-CM

## 2023-08-14 PROCEDURE — 99213 OFFICE O/P EST LOW 20 MIN: CPT | Mod: S$GLB,,, | Performed by: STUDENT IN AN ORGANIZED HEALTH CARE EDUCATION/TRAINING PROGRAM

## 2023-08-14 PROCEDURE — 3078F PR MOST RECENT DIASTOLIC BLOOD PRESSURE < 80 MM HG: ICD-10-PCS | Mod: CPTII,S$GLB,, | Performed by: STUDENT IN AN ORGANIZED HEALTH CARE EDUCATION/TRAINING PROGRAM

## 2023-08-14 PROCEDURE — 3074F PR MOST RECENT SYSTOLIC BLOOD PRESSURE < 130 MM HG: ICD-10-PCS | Mod: CPTII,S$GLB,, | Performed by: STUDENT IN AN ORGANIZED HEALTH CARE EDUCATION/TRAINING PROGRAM

## 2023-08-14 PROCEDURE — 1160F RVW MEDS BY RX/DR IN RCRD: CPT | Mod: CPTII,S$GLB,, | Performed by: STUDENT IN AN ORGANIZED HEALTH CARE EDUCATION/TRAINING PROGRAM

## 2023-08-14 PROCEDURE — 99999 PR PBB SHADOW E&M-EST. PATIENT-LVL IV: CPT | Mod: PBBFAC,,, | Performed by: STUDENT IN AN ORGANIZED HEALTH CARE EDUCATION/TRAINING PROGRAM

## 2023-08-14 PROCEDURE — 99213 PR OFFICE/OUTPT VISIT, EST, LEVL III, 20-29 MIN: ICD-10-PCS | Mod: S$GLB,,, | Performed by: STUDENT IN AN ORGANIZED HEALTH CARE EDUCATION/TRAINING PROGRAM

## 2023-08-14 PROCEDURE — 3074F SYST BP LT 130 MM HG: CPT | Mod: CPTII,S$GLB,, | Performed by: STUDENT IN AN ORGANIZED HEALTH CARE EDUCATION/TRAINING PROGRAM

## 2023-08-14 PROCEDURE — 1160F PR REVIEW ALL MEDS BY PRESCRIBER/CLIN PHARMACIST DOCUMENTED: ICD-10-PCS | Mod: CPTII,S$GLB,, | Performed by: STUDENT IN AN ORGANIZED HEALTH CARE EDUCATION/TRAINING PROGRAM

## 2023-08-14 PROCEDURE — 1159F MED LIST DOCD IN RCRD: CPT | Mod: CPTII,S$GLB,, | Performed by: STUDENT IN AN ORGANIZED HEALTH CARE EDUCATION/TRAINING PROGRAM

## 2023-08-14 PROCEDURE — 3078F DIAST BP <80 MM HG: CPT | Mod: CPTII,S$GLB,, | Performed by: STUDENT IN AN ORGANIZED HEALTH CARE EDUCATION/TRAINING PROGRAM

## 2023-08-14 PROCEDURE — 3008F PR BODY MASS INDEX (BMI) DOCUMENTED: ICD-10-PCS | Mod: CPTII,S$GLB,, | Performed by: STUDENT IN AN ORGANIZED HEALTH CARE EDUCATION/TRAINING PROGRAM

## 2023-08-14 PROCEDURE — 3008F BODY MASS INDEX DOCD: CPT | Mod: CPTII,S$GLB,, | Performed by: STUDENT IN AN ORGANIZED HEALTH CARE EDUCATION/TRAINING PROGRAM

## 2023-08-14 PROCEDURE — 99999 PR PBB SHADOW E&M-EST. PATIENT-LVL IV: ICD-10-PCS | Mod: PBBFAC,,, | Performed by: STUDENT IN AN ORGANIZED HEALTH CARE EDUCATION/TRAINING PROGRAM

## 2023-08-14 PROCEDURE — 1159F PR MEDICATION LIST DOCUMENTED IN MEDICAL RECORD: ICD-10-PCS | Mod: CPTII,S$GLB,, | Performed by: STUDENT IN AN ORGANIZED HEALTH CARE EDUCATION/TRAINING PROGRAM

## 2023-09-07 ENCOUNTER — PATIENT MESSAGE (OUTPATIENT)
Dept: RESEARCH | Facility: HOSPITAL | Age: 63
End: 2023-09-07
Payer: COMMERCIAL

## 2023-09-18 NOTE — PROGRESS NOTES
[facility] PRIMARY [facility]ARE  [address]  [facility] [address]  Dept: [phone]  Dept Fax: [phone]  Loc: [phone]  Loc Fax: [phone]    Sigrid Bello  1973    Televisit Note    You have chosen to receive care through a telephone visit. Do you consent to use a telephone visit for your medical care today? Yes. Pt at home and provider in office during visit today.    History of Present Illness:  Sigrid Bello is a 50 y.o. female who presents via video-conference for nausea, vomiting, and sore throat. Pt also has a fever today. Sx began about 2 days ago. Also having body aches and chills.    The following portions of the patient's history were reviewed and updated as appropriate: allergies, current medications, past family history, past medical history, past social history, past surgical history, and problem list.    This visit was scheduled as a phone visit to comply with patient safety concerns in accordance with CDC recommendations.  Time spent in discussion with the patient was 15 minutes.     Medications    Current Outpatient Medications:     acetaminophen (TYLENOL) 325 MG tablet, Take 2 tablets by mouth Every 4 (Four) Hours As Needed for Mild Pain. Take every 4 hours while awake for 7 days then only as needed., Disp: 100 tablet, Rfl: 0    albuterol sulfate  (90 Base) MCG/ACT inhaler, Inhale 2 puffs Every 4 (Four) Hours As Needed for Wheezing., Disp: , Rfl:     aspirin 81 MG EC tablet, Take 1 tablet by mouth Daily., Disp: , Rfl:     busPIRone (BUSPAR) 5 MG tablet, Take 1 tablet by mouth 3 (Three) Times a Day., Disp: 90 tablet, Rfl: 2    Continuous Blood Gluc  (Dexcom G7 ) device, 1 each Daily., Disp: 1 each, Rfl: 0    Continuous Blood Gluc Sensor (Dexcom G6 Sensor), Every 10 (Ten) Days., Disp: 3 each, Rfl: 3    Continuous Blood Gluc Sensor (Dexcom G7 Sensor) misc, 1 each Every 10 (Ten) Days., Disp: 3 each,  Subjective:       Patient ID: Tiny Silverman is a 62 y.o. female.    Vitals:  vitals were not taken for this visit.     Chief Complaint: No chief complaint on file.    HPI  ROS    Objective:      Physical Exam      Assessment:       1. Cough            Plan:         Cough  -     POCT COVID-19 Rapid Screening                      Rfl: 11    Continuous Blood Gluc Transmit (Dexcom G6 Transmitter) misc, 1 Device See Admin Instructions., Disp: 1 each, Rfl: 0    cyclobenzaprine (FLEXERIL) 10 MG tablet, Take 1 tablet by mouth 3 (Three) Times a Day As Needed for Muscle Spasms., Disp: 12 tablet, Rfl: 0    diphenhydrAMINE (BENADRYL) 25 MG tablet, Take 1 tablet by mouth At Night As Needed for Itching., Disp: , Rfl:     DULoxetine (Cymbalta) 30 MG capsule, Take 3 capsules by mouth Daily for 90 days., Disp: 90 capsule, Rfl: 2    EPINEPHrine 0.1 MG/0.1ML solution auto-injector, Inject  as directed., Disp: , Rfl:     ferrous sulfate 324 (65 Fe) MG tablet delayed-release EC tablet, Take 1 tablet by mouth 2 (Two) Times a Day With Meals. 2 tabs BID, Disp: , Rfl:     fluticasone-salmeterol (ADVAIR) 250-50 MCG/DOSE DISKUS, Inhale 2 puffs As Needed., Disp: , Rfl:     Fremanezumab-vfrm (Ajovy) 225 MG/1.5ML solution auto-injector, Inject 225 mg under the skin into the appropriate area as directed Every 30 (Thirty) Days., Disp: 1.5 mL, Rfl: 11    gabapentin (NEURONTIN) 400 MG capsule, Take 1 capsule by mouth 3 (Three) Times a Day., Disp: , Rfl:     glucagon (GLUCAGEN) 1 MG injection, Inject 1 mg into the appropriate muscle as directed by prescriber., Disp: , Rfl:     HumaLOG KwikPen 100 UNIT/ML solution pen-injector, Take 5-10 units TID with meals sliding scale NO MORE THAN 60UNITS DAILY, Disp: 500 mL, Rfl: 5    hydrOXYzine (ATARAX) 10 MG tablet, Take 1 tablet by mouth Every 4 (Four) Hours As Needed for Anxiety., Disp: 60 tablet, Rfl: 5    ibuprofen (ADVIL,MOTRIN) 600 MG tablet, Take 1 tablet by mouth 3 (Three) Times a Day., Disp: 15 tablet, Rfl: 0    Insulin Glargine (LANTUS SOLOSTAR) 100 UNIT/ML injection pen, Inject 10 Units under the skin into the appropriate area as directed 2 (Two) Times a Day., Disp: 6 mL, Rfl: 2    levETIRAcetam (KEPPRA) 500 MG tablet, Take 1 tablet by mouth 2 (Two) Times a Day for 90 days., Disp: 60 tablet, Rfl: 2    Magnesium 200 MG  chewable tablet, Chew 1 tablet Daily., Disp: , Rfl:     metoprolol tartrate (LOPRESSOR) 50 MG tablet, Take 1 tablet by mouth 2 (Two) Times a Day., Disp: 180 tablet, Rfl: 3    nitroglycerin (NITROSTAT) 0.6 MG SL tablet, Place 1 tablet under the tongue Every 5 (Five) Minutes As Needed for Chest Pain., Disp: , Rfl:     ondansetron ODT (ZOFRAN-ODT) 4 MG disintegrating tablet, Place 1 tablet on the tongue Every 8 (Eight) Hours As Needed for Nausea or Vomiting., Disp: 30 tablet, Rfl: 1    Potassium Chloride (KLOR-CON 10 PO), Take 1 tablet by mouth 2 (two) times a day., Disp: , Rfl:     prazosin (MINIPRESS) 5 MG capsule, Take 2 capsules by mouth Every Night for 90 days., Disp: 60 capsule, Rfl: 2    sertraline (Zoloft) 50 MG tablet, Take 1 tablet by mouth Daily for 90 days., Disp: 30 tablet, Rfl: 2    Sod Picosulfate-Mag Ox-Cit Acd (Clenpiq) 10-3.5-12 MG-GM -GM/160ML solution, Take 350 mL by mouth Take As Directed., Disp: 350 mL, Rfl: 0    traZODone (DESYREL) 50 MG tablet, Take 1-2 tablets PO 30 minutes prior to bedtime, Disp: 60 tablet, Rfl: 5    ubrogepant (UBRELVY) 100 MG tablet, Take 1 tablet by mouth 1 (One) Time As Needed (migraine) for up to 30 doses., Disp: 16 tablet, Rfl: 3    Unable to find, 1 each As Needed. Med Name: Hemp Citlalimies, Disp: , Rfl:     vitamin D (ERGOCALCIFEROL) 1.25 MG (82271 UT) capsule capsule, Take 1 capsule by mouth 1 (One) Time Per Week. Saturday's, Disp: , Rfl:     Subjective  Allergies   Allergen Reactions    Meperidine Anaphylaxis and Unknown (See Comments)     unknown    Morphine Shortness Of Breath and Unknown (See Comments)    Mushroom Anaphylaxis    Peanut Oil Anaphylaxis    Penicillins Unknown (See Comments) and Anaphylaxis     Childhood reaction    Iodine Hives    Tree Nut Hives    Cortisone Hives and Rash    Other Hives     Mayonnaise         Past Medical History:   Diagnosis Date    ADHD (attention deficit hyperactivity disorder)     Anemia     Anxiety and depression     Arthritis      Asthma     Atrial fibrillation     Blastomycosis     Diabetes mellitus     Fibromyalgia     HL (hearing loss)     Hyperlipidemia     Hypertension     Hypokalemia     Low back pain     Lupus     Migraine     MVP (mitral valve prolapse)     Obesity     PTSD (post-traumatic stress disorder)     Seizures     Last seizure March 2023    Sleep apnea     No CPAP    Tachycardia     Uterine mass        Past Surgical History:   Procedure Laterality Date    ANKLE SURGERY      both ankles rebuilt    INNER EAR SURGERY      KNEE SURGERY      TEETH EXTRACTION      TOTAL LAPAROSCOPIC HYSTERECTOMY SALPINGECTOMY N/A 7/20/2023    Procedure: TOTAL LAPAROSCOPIC HYSTERECTOMY BILATERAL SALPINGECTOMY WITH DAVINCI ROBOT;  Surgeon: Monisha Greene MD;  Location:  [facility];  Service: Robotics - DaVinci;  Laterality: N/A;    UVULECTOMY      VAGINAL REPAIR N/A 7/20/2023    Procedure: VAGINAL SIDEWALL LACERATION REPAIR LEFT;  Surgeon: Monisha Greene MD;  Location:  [facility];  Service: Obstetrics/Gynecology;  Laterality: N/A;       Family History   Problem Relation Age of Onset    Anxiety disorder Father     Breast cancer Maternal Grandmother     Asthma Paternal Grandfather     Cancer Paternal Grandfather         Mike    Depression Paternal Grandfather     Diabetes Paternal Grandfather     Hearing loss Paternal Grandfather     Ovarian cancer Neg Hx         Social History     Socioeconomic History    Marital status: Single   Tobacco Use    Smoking status: Never    Smokeless tobacco: Never   Vaping Use    Vaping Use: Never used   Substance and Sexual Activity    Alcohol use: Not Currently    Drug use: No    Sexual activity: Not Currently     Partners: Male     Birth control/protection: None         Objective  There were no vitals filed for this visit.  There is no height or weight on file to calculate BMI.    Assessment  Diagnoses and all orders for this visit:    1. Sore throat (Primary)    2. Nausea and vomiting, unspecified vomiting  type    Other orders  -     ondansetron ODT (ZOFRAN-ODT) 4 MG disintegrating tablet; Place 1 tablet on the tongue Every 8 (Eight) Hours As Needed for Nausea or Vomiting.  Dispense: 30 tablet; Refill: 1        Plan    1. Sore throat (Primary)- obtain flu, COVID, and strep swabs.    2. Nausea and vomiting, unspecified vomiting type- sent in Zofran as directed as needed.  Obtain flu, COVID, and strep swabs.  Advised on signs and symptoms of dehydration especially going 12 hours without urinating.  Advised if having any of the symptoms or signs to go to ER immediately.  Advised on hydration and BRAT diet. Patient verbalized understanding of all instructions given and complied.      Return if symptoms worsen or fail to improve.    Valente Jung PA-C  09/18/2023

## 2023-09-19 NOTE — PROGRESS NOTES
"  Subjective:      Patient ID: Tiny Silverman is a 63 y.o. female.    Vitals:  height is 5' 5" (1.651 m) and weight is 90.3 kg (199 lb). Her oral temperature is 101.4 °F (38.6 °C) (abnormal). Her blood pressure is 130/78 and her pulse is 93. Her respiration is 18 and oxygen saturation is 96%.     Chief Complaint: Fever (104 temp today. Yesterday covid booster, flu vaccine, tetanus, shingle vaccine )    HPI  ROS   Objective:     Physical Exam    Assessment:     1. Procedure not carried out because of patient's decision        Plan:       Procedure not carried out because of patient's decision    Pt was sent to ER for further treatment , was not seen by provider                The patient left the office before the visit was finished.  The patient left the office before the visit was finished.  "

## 2023-10-11 NOTE — PROGRESS NOTES
"Subjective:      Patient ID: Tiny Silverman is a 63 y.o. female.    Vitals:  height is 5' 5" (1.651 m) and weight is 90.3 kg (199 lb). Her oral temperature is 101.4 °F (38.6 °C) (abnormal). Her blood pressure is 130/78 and her pulse is 93. Her respiration is 18 and oxygen saturation is 96%.     Chief Complaint: Fever (104 temp today. Yesterday covid booster, flu vaccine, tetanus, shingle vaccine )    HPI  ROS   Objective:     Physical Exam    Assessment:     1. ERRONEOUS ENCOUNTER--DISREGARD        Plan:       ERRONEOUS ENCOUNTER--DISREGARD                    Pt was sent to [facility] ER for further treatment  "

## 2023-11-13 PROBLEM — G45.9 TIA (TRANSIENT ISCHEMIC ATTACK): Status: RESOLVED | Noted: 2023-08-10 | Resolved: 2023-11-13

## 2024-08-08 DIAGNOSIS — F41.9 ANXIETY: ICD-10-CM

## 2024-08-08 RX ORDER — ESCITALOPRAM OXALATE 20 MG/1
20 TABLET ORAL NIGHTLY
Qty: 90 TABLET | Refills: 0 | Status: SHIPPED | OUTPATIENT
Start: 2024-08-08

## 2024-08-13 DIAGNOSIS — J01.00 ACUTE NON-RECURRENT MAXILLARY SINUSITIS: ICD-10-CM

## 2024-08-13 RX ORDER — CETIRIZINE HYDROCHLORIDE 10 MG/1
10 TABLET ORAL
Qty: 90 TABLET | Refills: 0 | Status: SHIPPED | OUTPATIENT
Start: 2024-08-13

## 2024-10-08 DIAGNOSIS — I10 HYPERTENSION, UNSPECIFIED TYPE: ICD-10-CM

## 2024-10-08 DIAGNOSIS — E87.6 HYPOKALEMIA: ICD-10-CM

## 2024-10-08 DIAGNOSIS — I25.10 CORONARY ARTERY DISEASE, UNSPECIFIED VESSEL OR LESION TYPE, UNSPECIFIED WHETHER ANGINA PRESENT, UNSPECIFIED WHETHER NATIVE OR TRANSPLANTED HEART: Primary | ICD-10-CM

## 2024-10-08 DIAGNOSIS — E55.9 VITAMIN D DEFICIENCY: ICD-10-CM

## 2024-10-09 ENCOUNTER — TELEPHONE (OUTPATIENT)
Dept: INTERNAL MEDICINE | Facility: CLINIC | Age: 64
End: 2024-10-09

## 2024-10-09 NOTE — TELEPHONE ENCOUNTER
----- Message from Med Assistant Anderson sent at 10/9/2024  8:54 AM CDT -----  Regarding: PV 10/22/24 @ 9:40 Dr. Zavala  1. Are there any outstanding tasks in the patient's chart? Yes, fasting labs    2. Is there any documentation in the chart? No    3.Has patient been seen in an ER, Urgent care clinic, or been admitted since last visit?  If yes, When, where, and why    4. Has patient seen any other healthcare providers since last visit?  If yes, when, where, and why    5. Has patient had any bloodwork or XR done since last visit?    6. Is patient signed up for patient portal?

## (undated) DEVICE — INTRODUCER ANGIO PINNACLE 5X10

## (undated) DEVICE — SUT PROLENE 5/0 RB-1 36 IN

## (undated) DEVICE — KIT CANIST SUCTION 1200CC

## (undated) DEVICE — DEVICE INDEFLATOR BASIX

## (undated) DEVICE — BLLN VIATRAC 5X30 135CM

## (undated) DEVICE — BAG LABGUARD BIOHAZARD 6X9IN

## (undated) DEVICE — COVER PROBE US 5.5X58L NON LTX

## (undated) DEVICE — SOL IRRI STRL WATER 1000ML

## (undated) DEVICE — ENHANCE TRANSCAROTID/PERIPHERAL ACCESS KIT

## (undated) DEVICE — TIP SUCTION YANKAUER

## (undated) DEVICE — HEMOSTAT SURGICEL NU-KNIT 6X9

## (undated) DEVICE — Device

## (undated) DEVICE — CATH ANGIO SPR TRQ HYDRO CT 5F

## (undated) DEVICE — SYS ENROUTE TCAR NEURPROTCT

## (undated) DEVICE — CATH DIAGNOSTIC PIGTAIL 5F

## (undated) DEVICE — TUBING O2 FEMALE CONN 13FT

## (undated) DEVICE — KIT HAND CONTROL HIGH PRESSUR

## (undated) DEVICE — SUT VICRYL 3-0 27 SH

## (undated) DEVICE — PAD DEFIB RADIOLUCENT ADULT

## (undated) DEVICE — UNDERPAD DISPOSABLE 30X30IN

## (undated) DEVICE — SET ANGIO ACIST CVI ANGIOTOUCH

## (undated) DEVICE — GUIDEWIRE EMERALD 3MM 175X5CM

## (undated) DEVICE — ADHESIVE DERMABOND ADVANCED

## (undated) DEVICE — FORCEP BX LG CAP 2.8MMX240CM

## (undated) DEVICE — SHEATH PINNACLE INTRO .035 4FR

## (undated) DEVICE — KIT SURGICAL COLON .25 1.1OZ

## (undated) DEVICE — SUT MCRYL PLUS 4-0 PS2 27IN

## (undated) DEVICE — ELECTRODE DEFIB NON-RADIOPAQUE

## (undated) DEVICE — CANNULA ADULT NASAL 7FT

## (undated) DEVICE — BLOCK BLOX BITE DENT RIM 54FR

## (undated) DEVICE — KIT SURGIFLO HEMOSTATIC MATRIX

## (undated) DEVICE — COLLECTION SPECIMEN NEPTUNE

## (undated) DEVICE — ADAPTER DUAL NSL LUER M-M 7FT

## (undated) DEVICE — KIT GLIDESHEATH SLEND 6FR 10CM

## (undated) DEVICE — COVER LIGHT HANDLE CHROMOPHARE

## (undated) DEVICE — CONTAINER SPECIMEN SCREW 4OZ

## (undated) DEVICE — SUT SILK 2-0 SH 18IN BLACK

## (undated) DEVICE — PACK PACER PERMANENT